# Patient Record
Sex: MALE | Race: WHITE | Employment: OTHER | RURAL
[De-identification: names, ages, dates, MRNs, and addresses within clinical notes are randomized per-mention and may not be internally consistent; named-entity substitution may affect disease eponyms.]

---

## 2017-01-13 ENCOUNTER — OFFICE VISIT (OUTPATIENT)
Dept: FAMILY MEDICINE CLINIC | Age: 67
End: 2017-01-13

## 2017-01-13 VITALS
BODY MASS INDEX: 23.51 KG/M2 | SYSTOLIC BLOOD PRESSURE: 145 MMHG | WEIGHT: 154.6 LBS | DIASTOLIC BLOOD PRESSURE: 72 MMHG | OXYGEN SATURATION: 99 % | RESPIRATION RATE: 17 BRPM | TEMPERATURE: 96.8 F | HEART RATE: 75 BPM

## 2017-01-13 DIAGNOSIS — Z13.39 SCREENING FOR ALCOHOLISM: ICD-10-CM

## 2017-01-13 DIAGNOSIS — Z13.31 SCREENING FOR DEPRESSION: ICD-10-CM

## 2017-01-13 DIAGNOSIS — F17.200 SMOKING: ICD-10-CM

## 2017-01-13 DIAGNOSIS — Z23 ENCOUNTER FOR IMMUNIZATION: ICD-10-CM

## 2017-01-13 DIAGNOSIS — Z00.00 ROUTINE GENERAL MEDICAL EXAMINATION AT A HEALTH CARE FACILITY: Primary | ICD-10-CM

## 2017-01-13 DIAGNOSIS — I10 ESSENTIAL HYPERTENSION: ICD-10-CM

## 2017-01-13 DIAGNOSIS — R05.9 COUGH: ICD-10-CM

## 2017-01-13 DIAGNOSIS — M51.36 DEGENERATIVE LUMBAR DISC: ICD-10-CM

## 2017-01-13 DIAGNOSIS — I25.10 ASCVD (ARTERIOSCLEROTIC CARDIOVASCULAR DISEASE): ICD-10-CM

## 2017-01-13 RX ORDER — PREDNISONE 20 MG/1
20 TABLET ORAL
Qty: 6 TAB | Refills: 0 | Status: SHIPPED | OUTPATIENT
Start: 2017-01-13 | End: 2017-01-13 | Stop reason: SDUPTHER

## 2017-01-13 RX ORDER — PREDNISONE 20 MG/1
20 TABLET ORAL
Qty: 6 TAB | Refills: 0 | Status: SHIPPED | OUTPATIENT
Start: 2017-01-13 | End: 2017-01-19

## 2017-01-13 RX ORDER — DICLOFENAC SODIUM 75 MG/1
TABLET, DELAYED RELEASE ORAL
COMMUNITY
Start: 2016-12-28 | End: 2017-03-03

## 2017-01-13 RX ORDER — LISINOPRIL 5 MG/1
5 TABLET ORAL DAILY
Qty: 90 TAB | Refills: 3 | Status: SHIPPED | OUTPATIENT
Start: 2017-01-13 | End: 2017-10-09 | Stop reason: SDUPTHER

## 2017-01-13 RX ORDER — ALBUTEROL SULFATE 90 UG/1
2 AEROSOL, METERED RESPIRATORY (INHALATION)
Qty: 1 INHALER | Refills: 2 | Status: SHIPPED | OUTPATIENT
Start: 2017-01-13 | End: 2018-03-02 | Stop reason: SDUPTHER

## 2017-01-13 NOTE — ACP (ADVANCE CARE PLANNING)
Discussed ACP with the patient and patient states no advanced directive or living will at the present time. Patient stated that he will bring documentation in to be scanned when completed.

## 2017-01-13 NOTE — MR AVS SNAPSHOT
Visit Information Date & Time Provider Department Dept. Phone Encounter #  
 1/13/2017 11:10 AM Anahi Cid MD [address] [place] [address] [id number] Follow-up Instructions Return in about 3 months (around 4/13/2017). Upcoming Health Maintenance Date Due Hepatitis C Screening 1950 DTaP/Tdap/Td series (1 - Tdap) 5/24/1971 FOBT Q 1 YEAR AGE 50-75 5/24/2000 ZOSTER VACCINE AGE 60> 5/24/2010 GLAUCOMA SCREENING Q2Y 5/24/2015 Pneumococcal 65+ Low/Medium Risk (1 of 2 - PCV13) 5/24/2015 MEDICARE YEARLY EXAM 5/24/2015 Allergies as of 1/13/2017  Review Complete On: 1/13/2017 By: Anahi Cid MD  
  
 Severity Noted Reaction Type Reactions Penicillins  12/01/2016    Rash Current Immunizations  Never Reviewed Name Date Influenza Vaccine 11/1/2016 Pneumococcal Conjugate (PCV-13)  Incomplete Not reviewed this visit You Were Diagnosed With   
  
 Codes Comments Routine general medical examination at a health care facility    -  Primary ICD-10-CM: Z00.00 ICD-9-CM: V70.0 Degenerative lumbar disc     ICD-10-CM: M51.36 
ICD-9-CM: 722.52 Essential hypertension     ICD-10-CM: I10 
ICD-9-CM: 401.9 Smoking     ICD-10-CM: F17.200 ICD-9-CM: 305.1 Encounter for immunization     ICD-10-CM: U08 ICD-9-CM: V03.89 Screening for alcoholism     ICD-10-CM: Z13.89 ICD-9-CM: V79.1 Screening for depression     ICD-10-CM: Z13.89 ICD-9-CM: V79.0 Cough     ICD-10-CM: R05 ICD-9-CM: 786.2 ASCVD (arteriosclerotic cardiovascular disease)     ICD-10-CM: I25.10 ICD-9-CM: 429.2, 440.9 Vitals BP Pulse Temp Resp Weight(growth percentile) SpO2  
 145/72 (BP 1 Location: Right arm, BP Patient Position: Sitting) 75 96.8 °F (36 °C) (Oral) 17 154 lb 9.6 oz (70.1 kg) 99% BMI Smoking Status 23.51 kg/m2 Current Every Day Smoker Vitals History BMI and BSA Data Body Mass Index Body Surface Area  
 23.51 kg/m 2 1.83 m 2 Preferred Pharmacy Pharmacy Name Phone [facility], [place] - [address] [phone] Your Updated Medication List  
  
   
This list is accurate as of: 1/13/17 12:32 PM.  Always use your most recent med list.  
  
  
  
  
 albuterol 90 mcg/actuation inhaler Commonly known as:  PROVENTIL HFA, VENTOLIN HFA, PROAIR HFA Take 2 Puffs by inhalation every four (4) hours as needed for Wheezing. amLODIPine 5 mg tablet Commonly known as:  Lennis Eagles Take 1 Tab by mouth daily. aspirin delayed-release 81 mg tablet Take 1 Tab by mouth daily. Indications: prevent stroke, heart attack  
  
 atorvastatin 20 mg tablet Commonly known as:  LIPITOR Take 1 Tab by mouth daily. Indications: cholesterol and heart  
  
 diclofenac EC 75 mg EC tablet Commonly known as:  VOLTAREN  
  
 lisinopril 5 mg tablet Commonly known as:  Mina Hu Take 1 Tab by mouth daily. predniSONE 20 mg tablet Commonly known as:  Reino Aragon Take 1 Tab by mouth daily (with breakfast) for 6 days. Indications: lungs  
  
 traMADol 50 mg tablet Commonly known as:  ULTRAM  
Take 1 Tab by mouth three (3) times daily as needed for Pain. Max Daily Amount: 150 mg.  
  
  
  
  
Prescriptions Sent to Pharmacy Refills  
 lisinopril (PRINIVIL, ZESTRIL) 5 mg tablet 3 Sig: Take 1 Tab by mouth daily. Class: Normal  
 Pharmacy: [address] [place] [address], [facility] [address] [place] [address] Ph #: [phone] Route: Oral  
 predniSONE (DELTASONE) 20 mg tablet 0 Sig: Take 1 Tab by mouth daily (with breakfast) for 6 days. Indications: lungs Class: Normal  
 Pharmacy: [address] [place] [address] [address] [place] [address] Nick Saxena Ph #: [phone]  Route: Oral  
 albuterol (PROVENTIL HFA, VENTOLIN HFA, PROAIR HFA) 90 mcg/actuation inhaler 2  
 Sig: Take 2 Puffs by inhalation every four (4) hours as needed for Wheezing. Class: Normal  
 Pharmacy: [address] [place] [address] Nick Saxena Ph #: [phone] Route: Inhalation We Performed the Following ADMIN PNEUMOCOCCAL VACCINE [ HCPCS] Bubba 68 [[id number] HCPCS] PNEUMOCOCCAL CONJ VACCINE 13 VALENT IM [id number] CPT(R)] Follow-up Instructions Return in about 3 months (around 4/13/2017). Patient Instructions If you have any questions regarding iSquare, you may call iSquare support at [phone]. Introducing [facility] SERVICES! Dear Abdoul Tran: Thank you for requesting a 55social account. Our records indicate that you already have an active 55social account. You can access your account anytime at [email or web address] Did you know that you can access your hospital and ER discharge instructions at any time in 55social? You can also review all of your test results from your hospital stay or ER visit. Additional Information If you have questions, please visit the Frequently Asked Questions section of the 55social website at [email or web address] Remember, 55social is NOT to be used for urgent needs. For medical emergencies, dial 911. Now available from your iPhone and Android! Please provide this summary of care documentation to your next provider. Your primary care clinician is listed as Yoel Marte. If you have any questions after today's visit, please call [phone].

## 2017-01-13 NOTE — PROGRESS NOTES
Grace Edwards is a 77 y.o. male presenting for/with:    Hypertension    HPI:  Hypertension. Blood pressures have been improving. Management at last visit included changing regimen to add norvasc 5 qd. Current regimen: calcium channel blocker. Symptoms include no symptoms. Patient denies chest pain, palpitations, peripheral edema. Lab review:   Lab Results   Component Value Date/Time    Sodium 147 12/01/2016 10:56 AM    Potassium 4.4 12/01/2016 10:56 AM    Chloride 107 12/01/2016 10:56 AM    CO2 27 12/01/2016 10:56 AM    Glucose 79 12/01/2016 10:56 AM    BUN 16 12/01/2016 10:56 AM    Creatinine 0.85 12/01/2016 10:56 AM    BUN/Creatinine ratio 19 12/01/2016 10:56 AM    GFR est  12/01/2016 10:56 AM    GFR est non-AA 91 12/01/2016 10:56 AM    Calcium 9.6 12/01/2016 10:56 AM     Hyperlipidemia. On lipitor 20 now. Francis well. No myalgias, arthralgias, unusual weakness. Lab Results   Component Value Date/Time    Cholesterol, total 208 12/01/2016 10:56 AM    HDL Cholesterol 39 12/01/2016 10:56 AM    LDL, calculated 143 12/01/2016 10:56 AM    VLDL, calculated 26 12/01/2016 10:56 AM    Triglyceride 128 12/01/2016 10:56 AM     Lab Results   Component Value Date/Time    ALT 16 12/01/2016 10:56 AM    AST 17 12/01/2016 10:56 AM    Alk. phosphatase 68 12/01/2016 10:56 AM    Bilirubin, total 0.2 12/01/2016 10:56 AM     Nephrolithiasis  No recent spells. DJD  Off diclofenac and on tramadol due to very high BP's for past few visits. Pain doing ok, using tramadol sparingly (only used 2x). Did ok at first, but then a dose made pt feel loopy. Prev seen by Dr. Lencho Ordonez at [facility] back specialty clinic in [place] for that. Hasn't had any shots. Last visit about a year ago. PMH, SH, Medications/Allergies: reviewed, on chart.     ROS:  Constitutional: No fever, chills or weight loss  Respiratory: No cough, SOB   CV: No chest pain or Palpitations    Visit Vitals    /72 (BP 1 Location: Right arm, BP Patient Position: Sitting)  Comment (BP 1 Location): manual    Pulse 75    Temp 96.8 °F (36 °C) (Oral)    Resp 17    Wt 154 lb 9.6 oz (70.1 kg)    SpO2 99%    BMI 23.51 kg/m2     Wt Readings from Last 3 Encounters:   01/13/17 154 lb 9.6 oz (70.1 kg)   12/16/16 155 lb (70.3 kg)   12/01/16 162 lb (73.5 kg)     Physical Examination: General appearance - alert, well appearing, and in no distress  Mental status - alert, oriented to person, place, and time  Eyes - pupils equal and reactive, extraocular eye movements intact  ENT - bilateral external ears and nose normal. Normal lips. 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 Neck - supple, no significant adenopathy, no thyromegaly or mass  Lymphatics - no palpable lymphadenopathy, no hepatosplenomegaly  Chest - clear to auscultation, no wheezes, rales or rhonchi, symmetric air entry  Heart - normal rate, regular rhythm, normal S1, S2, no murmurs, rubs, clicks or gallops  Extremities - peripheral pulses normal, no pedal edema, no clubbing or cyanosis    Wt Readings from Last 3 Encounters:   01/13/17 154 lb 9.6 oz (70.1 kg)   12/16/16 155 lb (70.3 kg)   12/01/16 162 lb (73.5 kg)     BP Readings from Last 3 Encounters:   01/13/17 145/72   12/16/16 156/74   12/01/16 189/73     A/p:  HTN  Improved, GFR good. Stay off of NSAIDs for now. Con't  norvasc 5mg every day. Add ACEI lisinopril 5mg qd. HLD and ASCVD  High risk, 32.7% 10 yr ASCVD risk. Doing ok on lipitor 20 and ASA. Chart review shows aortic atherosclerosis on an old spine series. DJD knee, L-spine  Change tramadol to half pill + APAP 1-2 tabs BID prn. Smoking  Again Discussed cessation. PT will work on cutting back. Try rx prednisone 20mg for 6d to help with cough. Plan PFT's this spring. 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 Working on that.     F/U 3mo    ______________________________________________________________________    Chelita Sandoval Abigail Denton is a 77 y.o. male and presents for annual Medicare Wellness Visit. Problem List: Reviewed with patient and discussed risk factors. Patient Active Problem List   Diagnosis Code    Degenerative lumbar disc M51.36    Essential hypertension I10    Nephrolithiasis N20.0    Smoking F17.200    Pure hypercholesterolemia E78.00    Post-traumatic osteoarthritis of left knee M17.32       Current medical providers:  Patient Care Team:  Jose Alfredo Rayo MD as PCP - General (Family Practice)    PMH, , Medications/Allergies: reviewed, on chart. Male Alcohol Screening: On any occasion during the past 3 months, have you had more than 4 drinks containing alcohol? No  Do you average more than 14 drinks per week? No    ROS:  Constitutional: No fever, chills or weight loss  Respiratory: No cough, SOB   CV: No chest pain or Palpitations    Objective:  Visit Vitals    /72 (BP 1 Location: Right arm, BP Patient Position: Sitting)  Comment (BP 1 Location): manual    Pulse 75    Temp 96.8 °F (36 °C) (Oral)    Resp 17    Wt 154 lb 9.6 oz (70.1 kg)    SpO2 99%    BMI 23.51 kg/m2    Body mass index is 23.51 kg/(m^2). Assessment of cognitive impairment: Alert and oriented x 3    Depression Screen:   PHQ 2 / 9, over the last two weeks 12/16/2016   Little interest or pleasure in doing things Not at all   Feeling down, depressed or hopeless Not at all   Total Score PHQ 2 0       Fall Risk Assessment:    Fall Risk Assessment, last 12 mths 12/16/2016   Able to walk? Yes   Fall in past 12 months? No       Functional Ability:   Does the patient exhibit a steady gait? yes   How long did it take the patient to get up and walk from a sitting position? 1s   Is the patient self reliant?  (ie can do own laundry, meals, household chores)  yes     Does the patient handle his/her own medications? yes     Does the patient handle his/her own money?    yes     Is the patients home safe (ie good lighting, handrails on stairs and bath, etc.)? yes     Did you notice or did patient express any hearing difficulties? no     Did you notice or did patient express any vision difficulties? no       Advance Care Planning:   Patient was offered the opportunity to discuss advance care planning:  yes     Does patient have an Advance Directive:  no   If no, did you provide information on Caring Connections? yes       Plan:      Colon ca screen: FIT check ordered. Orders Placed This Encounter    diclofenac EC (VOLTAREN) 75 mg EC tablet    lisinopril (PRINIVIL, ZESTRIL) 5 mg tablet    DISCONTD: predniSONE (DELTASONE) 20 mg tablet    predniSONE (DELTASONE) 20 mg tablet       Health Maintenance   Topic Date Due    Hepatitis C Screening  1950    DTaP/Tdap/Td series (1 - Tdap) 05/24/1971    FOBT Q 1 YEAR AGE 50-75  05/24/2000    ZOSTER VACCINE AGE 60>  05/24/2010    GLAUCOMA SCREENING Q2Y  05/24/2015    Pneumococcal 65+ Low/Medium Risk (1 of 2 - PCV13) 05/24/2015    MEDICARE YEARLY EXAM  05/24/2015    INFLUENZA AGE 9 TO ADULT  Completed       *Patient verbalized understanding and agreement with the plan. A copy of the After Visit Summary with personalized health plan was given to the patient today.

## 2017-01-23 RX ORDER — PREDNISONE 20 MG/1
TABLET ORAL
Qty: 6 TAB | Refills: 0 | Status: SHIPPED | OUTPATIENT
Start: 2017-01-23 | End: 2017-03-03

## 2017-03-03 ENCOUNTER — OFFICE VISIT (OUTPATIENT)
Dept: FAMILY MEDICINE CLINIC | Age: 67
End: 2017-03-03

## 2017-03-03 ENCOUNTER — TELEPHONE (OUTPATIENT)
Dept: FAMILY MEDICINE CLINIC | Age: 67
End: 2017-03-03

## 2017-03-03 VITALS
DIASTOLIC BLOOD PRESSURE: 69 MMHG | TEMPERATURE: 96.9 F | HEIGHT: 68 IN | OXYGEN SATURATION: 98 % | SYSTOLIC BLOOD PRESSURE: 138 MMHG | RESPIRATION RATE: 18 BRPM | HEART RATE: 90 BPM | WEIGHT: 154.8 LBS | BODY MASS INDEX: 23.46 KG/M2

## 2017-03-03 DIAGNOSIS — J40 BRONCHITIS: Primary | ICD-10-CM

## 2017-03-03 RX ORDER — AZITHROMYCIN 250 MG/1
TABLET, FILM COATED ORAL
Qty: 6 TAB | Refills: 0 | Status: SHIPPED | OUTPATIENT
Start: 2017-03-03 | End: 2017-03-08

## 2017-03-03 NOTE — TELEPHONE ENCOUNTER
Call from patient has had a chest cold for over a week, fever, cough, mucus. Did have a flu and pneumonia vaccine.  Asking for an ATB, explained most likely this is  viral but we will evaluate on his visit. scheduled

## 2017-03-03 NOTE — MR AVS SNAPSHOT
Visit Information Date & Time Provider Department Dept. Phone Encounter #  
 3/3/2017  1:00 PM Amada Clark, [facility] Siva Bentley [id number] Your Appointments 4/13/2017 11:10 AM  
ESTABLISHED PATIENT with Dino Libman, MD [address] [facility] Appt Note: 3 mo f/u  
 [address] [facility] [address] [facility],5[address] [address] [phone]  
  
   
 [address] [facility] [address] [facility],5Th [address] [address] Upcoming Health Maintenance Date Due Hepatitis C Screening 1950 DTaP/Tdap/Td series (1 - Tdap) 5/24/1971 FOBT Q 1 YEAR AGE 50-75 5/24/2000 ZOSTER VACCINE AGE 60> 5/24/2010 GLAUCOMA SCREENING Q2Y 5/24/2015 Pneumococcal 65+ Low/Medium Risk (2 of 2 - PPSV23) 1/13/2018 MEDICARE YEARLY EXAM 1/14/2018 Allergies as of 3/3/2017  Review Complete On: 3/3/2017 By: Amada Clark MD  
  
 Severity Noted Reaction Type Reactions Penicillins  12/01/2016    Rash Current Immunizations  Never Reviewed Name Date Influenza Vaccine 11/1/2016 Pneumococcal Conjugate (PCV-13) 1/13/2017 12:32 PM  
  
 Not reviewed this visit Vitals BP  
  
  
  
  
  
 138/69 (BP 1 Location: Right arm, BP Patient Position: Sitting) Vitals History BMI and BSA Data Body Mass Index Body Surface Area  
 23.54 kg/m 2 1.84 m 2 Preferred Pharmacy Pharmacy Name Phone [facility] [place] [address] [address] [phone] Your Updated Medication List  
  
   
This list is accurate as of: 3/3/17  1:19 PM.  Always use your most recent med list.  
  
  
  
  
 albuterol 90 mcg/actuation inhaler Commonly known as:  PROVENTIL HFA, VENTOLIN HFA, PROAIR HFA Take 2 Puffs by inhalation every four (4) hours as needed for Wheezing. amLODIPine 5 mg tablet Commonly known as:  Ashwini Nelly Take 1 Tab by mouth daily. aspirin delayed-release 81 mg tablet Take 1 Tab by mouth daily. Indications: prevent stroke, heart attack  
  
 atorvastatin 20 mg tablet Commonly known as:  LIPITOR Take 1 Tab by mouth daily. Indications: cholesterol and heart  
  
 lisinopril 5 mg tablet Commonly known as:  Corin Bernabe Take 1 Tab by mouth daily. traMADol 50 mg tablet Commonly known as:  ULTRAM  
Take 1 Tab by mouth three (3) times daily as needed for Pain. Max Daily Amount: 150 mg. Introducing [facility] & HE[facility] SERVICES! Dear Emily Ortega: Thank you for requesting a NJVC account. Our records indicate that you already have an active NJVC account. You can access your account anytime at [email or web address] Did you know that you can access your hospital and ER discharge instructions at any time in NJVC? You can also review all of your test results from your hospital stay or ER visit. Additional Information If you have questions, please visit the Frequently Asked Questions section of the NJVC website at [email or web address] Remember, NJVC is NOT to be used for urgent needs. For medical emergencies, dial 911. Now available from your iPhone and Android! Please provide this summary of care documentation to your next provider. Your primary care clinician is listed as Shahnaz Marte. If you have any questions after today's visit, please call [phone].

## 2017-03-03 NOTE — PROGRESS NOTES
Chief Complaint   Patient presents with    Nasal Congestion     sinus pressure, sore throat, night sweats. HPI:       is a 77 y.o. male. Hypertension well controlled with meds. Down to 6 cigarettes daily. Nonbloody productive cough for 3 weeks, with sweats and chills. Thick mucus. Has Proair inhaler. Appetite is good. No WHITE or rigors. Allergies   Allergen Reactions    Penicillins Rash       Current Outpatient Prescriptions   Medication Sig    azithromycin (ZITHROMAX) 250 mg tablet Take 2 tablets today, then take 1 tablet daily    lisinopril (PRINIVIL, ZESTRIL) 5 mg tablet Take 1 Tab by mouth daily.  amLODIPine (NORVASC) 5 mg tablet Take 1 Tab by mouth daily.  aspirin delayed-release 81 mg tablet Take 1 Tab by mouth daily. Indications: prevent stroke, heart attack    atorvastatin (LIPITOR) 20 mg tablet Take 1 Tab by mouth daily. Indications: cholesterol and heart    albuterol (PROVENTIL HFA, VENTOLIN HFA, PROAIR HFA) 90 mcg/actuation inhaler Take 2 Puffs by inhalation every four (4) hours as needed for Wheezing.  traMADol (ULTRAM) 50 mg tablet Take 1 Tab by mouth three (3) times daily as needed for Pain. Max Daily Amount: 150 mg. No current facility-administered medications for this visit. Past Medical History:   Diagnosis Date    Arthritis     Hypertension          ROS:  Denies fever, chills, cough, chest pain, SOB,  nausea, vomiting, or diarrhea. Denies wt loss, wt gain, hemoptysis, hematochezia or melena.     Physical Examination:    Visit Vitals    /69 (BP 1 Location: Right arm, BP Patient Position: Sitting)    Pulse 90    Temp 96.9 °F (36.1 °C) (Oral)    Resp 18    Ht 5' 8\" (1.727 m)    Wt 154 lb 12.8 oz (70.2 kg)    SpO2 98%    BMI 23.54 kg/m2     General: Alert and Ox3, Fluent speech  HEENT:  NC/AT, EOMI, OP: clear  Neck:  Supple, no adenopathy, JVD, mass or bruit  Chest:  Clear to Ausculation, without wheezes, rales, rubs or cheri  Cardiac: RRR  Abdomen:  +BS, soft, nontender without palpable HSM  Extremities:  No cyanosis, clubbing or edema  Neurologic:  Ambulatory without assist, CN 2-12 grossly intact. Moves all extremities. Skin: no rash  Lymphadenopathy: no cervical or supraclavicular nodes      ASSESSMENT AND PLAN:     1. Bronchitis:  Begin Prednisone (has some at home): 20 mg daily for a week and begin Z Pack. If still coughing by Health system' day, check CXR.     Orders Placed This Encounter    azithromycin (ZITHROMAX) 250 mg tablet     Sig: Take 2 tablets today, then take 1 tablet daily     Dispense:  6 Tab     Refill:  0       Mar Boeck, MD, [address]

## 2017-04-13 ENCOUNTER — OFFICE VISIT (OUTPATIENT)
Dept: FAMILY MEDICINE CLINIC | Age: 67
End: 2017-04-13

## 2017-04-13 VITALS
HEIGHT: 68 IN | DIASTOLIC BLOOD PRESSURE: 59 MMHG | WEIGHT: 158 LBS | HEART RATE: 78 BPM | OXYGEN SATURATION: 100 % | TEMPERATURE: 97.8 F | SYSTOLIC BLOOD PRESSURE: 140 MMHG | RESPIRATION RATE: 18 BRPM | BODY MASS INDEX: 23.95 KG/M2

## 2017-04-13 DIAGNOSIS — R05.9 COUGH: ICD-10-CM

## 2017-04-13 DIAGNOSIS — F17.200 SMOKING: ICD-10-CM

## 2017-04-13 DIAGNOSIS — I10 ESSENTIAL HYPERTENSION: ICD-10-CM

## 2017-04-13 DIAGNOSIS — I25.10 ASCVD (ARTERIOSCLEROTIC CARDIOVASCULAR DISEASE): Primary | ICD-10-CM

## 2017-04-13 NOTE — PATIENT INSTRUCTIONS
Keep up the good work! Think about getting a shingles shot. Stopping Smoking: Care Instructions  Your Care Instructions  Cigarette smokers crave the nicotine in cigarettes. Giving it up is much harder than simply changing a habit. Your body has to stop craving the nicotine. It is hard to quit, but you can do it. There are many tools that people use to quit smoking. You may find that combining tools works best for you. There are several steps to quitting. First you get ready to quit. Then you get support to help you. After that, you learn new skills and behaviors to become a nonsmoker. For many people, a necessary step is getting and using medicine. Your doctor will help you set up the plan that best meets your needs. You may want to attend a smoking cessation program to help you quit smoking. When you choose a program, look for one that has proven success. Ask your doctor for ideas. You will greatly increase your chances of success if you take medicine as well as get counseling or join a cessation program.  Some of the changes you feel when you first quit tobacco are uncomfortable. Your body will miss the nicotine at first, and you may feel short-tempered and grumpy. You may have trouble sleeping or concentrating. Medicine can help you deal with these symptoms. You may struggle with changing your smoking habits and rituals. The last step is the tricky one: Be prepared for the smoking urge to continue for a time. This is a lot to deal with, but keep at it. You will feel better. Follow-up care is a key part of your treatment and safety. Be sure to make and go to all appointments, and call your doctor if you are having problems. Its also a good idea to know your test results and keep a list of the medicines you take. How can you care for yourself at home? · Ask your family, friends, and coworkers for support. You have a better chance of quitting if you have help and support.   · Join a support group, such as Nicotine Anonymous, for people who are trying to quit smoking. · Consider signing up for a smoking cessation program, such as the American Lung Association's Freedom from Smoking program.  · Set a quit date. Pick your date carefully so that it is not right in the middle of a big deadline or stressful time. Once you quit, do not even take a puff. Get rid of all ashtrays and lighters after your last cigarette. Clean your house and your clothes so that they do not smell of smoke. · Learn how to be a nonsmoker. Think about ways you can avoid those things that make you reach for a cigarette. ¨ Avoid situations that put you at greatest risk for smoking. For some people, it is hard to have a drink with friends without smoking. For others, they might skip a coffee break with coworkers who smoke. ¨ Change your daily routine. Take a different route to work or eat a meal in a different place. · Cut down on stress. Calm yourself or release tension by doing an activity you enjoy, such as reading a book, taking a hot bath, or gardening. · Talk to your doctor or pharmacist about nicotine replacement therapy, which replaces the nicotine in your body. You still get nicotine but you do not use tobacco. Nicotine replacement products help you slowly reduce the amount of nicotine you need. These products come in several forms, many of them available over-the-counter:  ¨ Nicotine patches  ¨ Nicotine gum and lozenges  ¨ Nicotine inhaler  · Ask your doctor about bupropion (Wellbutrin) or varenicline (Chantix), which are prescription medicines. They do not contain nicotine. They help you by reducing withdrawal symptoms, such as stress and anxiety. · Some people find hypnosis, acupuncture, and massage helpful for ending the smoking habit. · Eat a healthy diet and get regular exercise. Having healthy habits will help your body move past its craving for nicotine. · Be prepared to keep trying.  Most people are not successful the first few times they try to quit. Do not get mad at yourself if you smoke again. Make a list of things you learned and think about when you want to try again, such as next week, next month, or next year. Where can you learn more? Go to http://shellie-easton.info/. Enter Y733 in the search box to learn more about \"Stopping Smoking: Care Instructions. \"  Current as of: May 26, 2016  Content Version: 11.2  © 4254-9227 "[employer] Incorporated. Care instructions adapted under license by [employer] (which disclaims liability or warranty for this information). If you have questions about a medical condition or this instruction, always ask your healthcare professional. [address] any warranty or liability for your use of this information.

## 2017-04-13 NOTE — MR AVS SNAPSHOT
Visit Information Date & Time Provider Department Dept. Phone Encounter #  
 4/13/2017 11:10 AM Kristen Burns MD [address] [place] [address] [id number] Follow-up Instructions Return in about 6 months (around 10/13/2017). Follow-up and Disposition History Upcoming Health Maintenance Date Due Hepatitis C Screening 1950 DTaP/Tdap/Td series (1 - Tdap) 5/24/1971 FOBT Q 1 YEAR AGE 50-75 5/24/2000 ZOSTER VACCINE AGE 60> 5/24/2010 GLAUCOMA SCREENING Q2Y 5/24/2015 Pneumococcal 65+ Low/Medium Risk (2 of 2 - PPSV23) 1/13/2018 MEDICARE YEARLY EXAM 1/14/2018 Allergies as of 4/13/2017  Review Complete On: 4/13/2017 By: Kristen Burns MD  
  
 Severity Noted Reaction Type Reactions Penicillins  12/01/2016    Rash Current Immunizations  Never Reviewed Name Date Influenza Vaccine 11/1/2016 Pneumococcal Conjugate (PCV-13) 1/13/2017 12:32 PM  
  
 Not reviewed this visit You Were Diagnosed With   
  
 Codes Comments ASCVD (arteriosclerotic cardiovascular disease)    -  Primary ICD-10-CM: I25.10 ICD-9-CM: 429.2, 440.9 Essential hypertension     ICD-10-CM: I10 
ICD-9-CM: 401.9 Smoking     ICD-10-CM: F17.200 ICD-9-CM: 305.1 Cough     ICD-10-CM: R05 ICD-9-CM: 821. 2 Vitals BP Pulse Temp Resp Height(growth percentile) Weight(growth percentile) 140/59 78 97.8 °F (36.6 °C) (Oral) 18 5' 8\" (1.727 m) 158 lb (71.7 kg) SpO2 BMI Smoking Status 100% 24.02 kg/m2 Current Every Day Smoker BMI and BSA Data Body Mass Index Body Surface Area 24.02 kg/m 2 1.85 m 2 Preferred Pharmacy Pharmacy Name Phone [facility] [place] [facility] [address] [phone] Your Updated Medication List  
  
   
This list is accurate as of: 4/13/17 12:27 PM.  Always use your most recent med list.  
  
  
  
  
 albuterol 90 mcg/actuation inhaler Commonly known as:  PROVENTIL HFA, VENTOLIN HFA, PROAIR HFA Take 2 Puffs by inhalation every four (4) hours as needed for Wheezing. amLODIPine 5 mg tablet Commonly known as:  Monica Jean Take 1 Tab by mouth daily. aspirin delayed-release 81 mg tablet Take 1 Tab by mouth daily. Indications: prevent stroke, heart attack  
  
 atorvastatin 20 mg tablet Commonly known as:  LIPITOR Take 1 Tab by mouth daily. Indications: cholesterol and heart  
  
 lisinopril 5 mg tablet Commonly known as:  Salvadore Dine Take 1 Tab by mouth daily. Follow-up Instructions Return in about 6 months (around 10/13/2017). To-Do List   
 04/13/2017 VT Charge:  VT EVAL OF BRONCHOSPASM Patient Instructions Keep up the good work! Think about getting a shingles shot. Stopping Smoking: Care Instructions Your Care Instructions Cigarette smokers crave the nicotine in cigarettes. Giving it up is much harder than simply changing a habit. Your body has to stop craving the nicotine. It is hard to quit, but you can do it. There are many tools that people use to quit smoking. You may find that combining tools works best for you. There are several steps to quitting. First you get ready to quit. Then you get support to help you. After that, you learn new skills and behaviors to become a nonsmoker. For many people, a necessary step is getting and using medicine. Your doctor will help you set up the plan that best meets your needs. You may want to attend a smoking cessation program to help you quit smoking. When you choose a program, look for one that has proven success. Ask your doctor for ideas. You will greatly increase your chances of success if you take medicine as well as get counseling or join a cessation program. 
Some of the changes you feel when you first quit tobacco are uncomfortable.  Your body will miss the nicotine at first, and you may feel short-tempered and grumpy. You may have trouble sleeping or concentrating. Medicine can help you deal with these symptoms. You may struggle with changing your smoking habits and rituals. The last step is the tricky one: Be prepared for the smoking urge to continue for a time. This is a lot to deal with, but keep at it. You will feel better. Follow-up care is a key part of your treatment and safety. Be sure to make and go to all appointments, and call your doctor if you are having problems. Its also a good idea to know your test results and keep a list of the medicines you take. How can you care for yourself at home? · Ask your family, friends, and coworkers for support. You have a better chance of quitting if you have help and support. · Join a support group, such as Nicotine Anonymous, for people who are trying to quit smoking. · Consider signing up for a smoking cessation program, such as the American Lung Association's Freedom from Smoking program. 
· Set a quit date. Pick your date carefully so that it is not right in the middle of a big deadline or stressful time. Once you quit, do not even take a puff. Get rid of all ashtrays and lighters after your last cigarette. Clean your house and your clothes so that they do not smell of smoke. · Learn how to be a nonsmoker. Think about ways you can avoid those things that make you reach for a cigarette. ¨ Avoid situations that put you at greatest risk for smoking. For some people, it is hard to have a drink with friends without smoking. For others, they might skip a coffee break with coworkers who smoke. ¨ Change your daily routine. Take a different route to work or eat a meal in a different place. · Cut down on stress. Calm yourself or release tension by doing an activity you enjoy, such as reading a book, taking a hot bath, or gardening.  
· Talk to your doctor or pharmacist about nicotine replacement therapy, which replaces the nicotine in your body. You still get nicotine but you do not use tobacco. Nicotine replacement products help you slowly reduce the amount of nicotine you need. These products come in several forms, many of them available over-the-counter: ¨ Nicotine patches ¨ Nicotine gum and lozenges ¨ Nicotine inhaler · Ask your doctor about bupropion (Wellbutrin) or varenicline (Chantix), which are prescription medicines. They do not contain nicotine. They help you by reducing withdrawal symptoms, such as stress and anxiety. · Some people find hypnosis, acupuncture, and massage helpful for ending the smoking habit. · Eat a healthy diet and get regular exercise. Having healthy habits will help your body move past its craving for nicotine. · Be prepared to keep trying. Most people are not successful the first few times they try to quit. Do not get mad at yourself if you smoke again. Make a list of things you learned and think about when you want to try again, such as next week, next month, or next year. Where can you learn more? Go to http://shellie-easton.info/. Enter A097 in the search box to learn more about \"Stopping Smoking: Care Instructions. \" Current as of: May 26, 2016 Content Version: 11.2 © 5214-3154 Newser, Placements.[employer]. Care instructions adapted under license by [employer] (which disclaims liability or warranty for this information). If you have questions about a medical condition or this instruction, always ask your healthcare professional. [address] any warranty or liability for your use of this information. Introducing [facility] [place] & HEALTH SERVICES! Dear Rory Hoskins: Thank you for requesting a iMICROQ account. Our records indicate that you already have an active iMICROQ account. You can access your account anytime at [email or web address] Did you know that you can access your hospital and ER discharge instructions at any time in Babycare? You can also review all of your test results from your hospital stay or ER visit. Additional Information If you have questions, please visit the Frequently Asked Questions section of the Babycare website at [email or web address] Remember, Babycare is NOT to be used for urgent needs. For medical emergencies, dial 911. Now available from your iPhone and Android! Please provide this summary of care documentation to your next provider. Your primary care clinician is listed as Ricardo Marte. If you have any questions after today's visit, please call [phone].

## 2017-04-13 NOTE — PROGRESS NOTES
Codey Ott is a 77 y.o. male presenting for/with:    Blood Pressure Check    HPI:  Hypertension. Blood pressures have been improving. Management at last visit included changing regimen to add lisinporil 5 qd. Current regimen: ACEI and calcium channel blocker. Symptoms include no symptoms. Patient denies chest pain, palpitations, peripheral edema. Lab review:   Lab Results   Component Value Date/Time    Sodium 147 12/01/2016 10:56 AM    Potassium 4.4 12/01/2016 10:56 AM    Chloride 107 12/01/2016 10:56 AM    CO2 27 12/01/2016 10:56 AM    Glucose 79 12/01/2016 10:56 AM    BUN 16 12/01/2016 10:56 AM    Creatinine 0.85 12/01/2016 10:56 AM    BUN/Creatinine ratio 19 12/01/2016 10:56 AM    GFR est  12/01/2016 10:56 AM    GFR est non-AA 91 12/01/2016 10:56 AM    Calcium 9.6 12/01/2016 10:56 AM     Hyperlipidemia. On lipitor 20 now. Francis well. No myalgias, arthralgias, unusual weakness. Lab Results   Component Value Date/Time    Cholesterol, total 208 12/01/2016 10:56 AM    HDL Cholesterol 39 12/01/2016 10:56 AM    LDL, calculated 143 12/01/2016 10:56 AM    VLDL, calculated 26 12/01/2016 10:56 AM    Triglyceride 128 12/01/2016 10:56 AM     Lab Results   Component Value Date/Time    ALT (SGPT) 16 12/01/2016 10:56 AM    AST (SGOT) 17 12/01/2016 10:56 AM    Alk. phosphatase 68 12/01/2016 10:56 AM    Bilirubin, total 0.2 12/01/2016 10:56 AM     Nephrolithiasis  No recent spells. DJD  Off diclofenac and on TLC's. Not doing too bad. Gets sore if he stands. Using tramadol very sparingly, used 1 pill in past 2mo. Salonpas patches also help. PMH, SH, Medications/Allergies: reviewed, on chart.     ROS:  Constitutional: No fever, chills or weight loss  Respiratory: No cough, SOB   CV: No chest pain or Palpitations    Visit Vitals    /59    Pulse 78    Temp 97.8 °F (36.6 °C) (Oral)    Resp 18    Ht 5' 8\" (1.727 m)    Wt 158 lb (71.7 kg)    SpO2 100%    BMI 24.02 kg/m2     Wt Readings from Last 3 Encounters:   04/13/17 158 lb (71.7 kg)   03/03/17 154 lb 12.8 oz (70.2 kg)   01/13/17 154 lb 9.6 oz (70.1 kg)     Physical Examination: General appearance - alert, well appearing, and in no distress  Mental status - alert, oriented to person, place, and time  Eyes - pupils equal and reactive, extraocular eye movements intact  ENT - bilateral external ears and nose normal. Normal lips. OP shows resolved gum whitening. Neck - supple, no significant adenopathy, no thyromegaly or mass  Lymphatics - no palpable lymphadenopathy, no hepatosplenomegaly  Chest - clear to auscultation, no wheezes, rales or rhonchi, symmetric air entry  Heart - normal rate, regular rhythm, normal S1, S2, no murmurs, rubs, clicks or gallops  Extremities - peripheral pulses normal, no pedal edema, no clubbing or cyanosis    A/p:  HTN  Improved, GFR good, in goal. Con't  norvasc 5mg every day and ACEI lisinopril 5mg qd. HLD and ASCVD  High risk, 32.7% 10 yr ASCVD risk. Doing ok on lipitor 20 and ASA. Chart review shows aortic atherosclerosis on an old spine series. Keep BPs well controlled. DJD knee, L-spine  Change tramadol to half pill + APAP 1-2 tabs BID prn. Smoking  Again Discussed cessation. PFT's ordered. Hasn't picked a date yet, does want to quit. Leukoplakia  Resolved. Monitor for now    F/U 6mo, plan labs next visit. Pt thinking of zostavax. Will consider next visit. Health Maintenance   Topic Date Due    Hepatitis C Screening  1950    DTaP/Tdap/Td series (1 - Tdap) 05/24/1971    FOBT Q 1 YEAR AGE 50-75  05/24/2000    ZOSTER VACCINE AGE 60>  05/24/2010    GLAUCOMA SCREENING Q2Y  05/24/2015    Pneumococcal 65+ Low/Medium Risk (2 of 2 - PPSV23) 01/13/2018    MEDICARE YEARLY EXAM  01/14/2018    INFLUENZA AGE 9 TO ADULT  Completed     *Patient verbalized understanding and agreement with the plan. A copy of the After Visit Summary with personalized health plan was given to the patient today.

## 2017-10-09 DIAGNOSIS — I10 ESSENTIAL HYPERTENSION: ICD-10-CM

## 2017-10-09 DIAGNOSIS — E78.00 PURE HYPERCHOLESTEROLEMIA: ICD-10-CM

## 2017-10-10 RX ORDER — LISINOPRIL 5 MG/1
TABLET ORAL
Qty: 90 TAB | Refills: 3 | Status: SHIPPED | OUTPATIENT
Start: 2017-10-10 | End: 2017-10-16 | Stop reason: SDUPTHER

## 2017-10-10 RX ORDER — AMLODIPINE BESYLATE 5 MG/1
TABLET ORAL
Qty: 90 TAB | Refills: 3 | Status: SHIPPED | OUTPATIENT
Start: 2017-10-10 | End: 2018-07-25 | Stop reason: SDUPTHER

## 2017-10-10 RX ORDER — ATORVASTATIN CALCIUM 20 MG/1
TABLET, FILM COATED ORAL
Qty: 90 TAB | Refills: 3 | Status: SHIPPED | OUTPATIENT
Start: 2017-10-10 | End: 2018-07-25 | Stop reason: SDUPTHER

## 2017-10-16 ENCOUNTER — OFFICE VISIT (OUTPATIENT)
Dept: FAMILY MEDICINE CLINIC | Age: 67
End: 2017-10-16

## 2017-10-16 VITALS
HEIGHT: 68 IN | OXYGEN SATURATION: 99 % | BODY MASS INDEX: 23.95 KG/M2 | WEIGHT: 158 LBS | DIASTOLIC BLOOD PRESSURE: 66 MMHG | TEMPERATURE: 98.4 F | SYSTOLIC BLOOD PRESSURE: 152 MMHG | HEART RATE: 75 BPM

## 2017-10-16 DIAGNOSIS — I10 ESSENTIAL HYPERTENSION: ICD-10-CM

## 2017-10-16 DIAGNOSIS — Z11.59 ENCOUNTER FOR HEPATITIS C SCREENING TEST FOR LOW RISK PATIENT: ICD-10-CM

## 2017-10-16 DIAGNOSIS — F17.200 SMOKING: ICD-10-CM

## 2017-10-16 DIAGNOSIS — M47.816 SPONDYLOSIS OF LUMBAR REGION WITHOUT MYELOPATHY OR RADICULOPATHY: ICD-10-CM

## 2017-10-16 DIAGNOSIS — Z23 ENCOUNTER FOR IMMUNIZATION: Primary | ICD-10-CM

## 2017-10-16 DIAGNOSIS — F43.21 ADJUSTMENT DISORDER WITH DEPRESSED MOOD: ICD-10-CM

## 2017-10-16 DIAGNOSIS — E78.00 PURE HYPERCHOLESTEROLEMIA: ICD-10-CM

## 2017-10-16 RX ORDER — TRAMADOL HYDROCHLORIDE 50 MG/1
50 TABLET ORAL
Qty: 45 TAB | Refills: 1 | Status: SHIPPED | OUTPATIENT
Start: 2017-10-16 | End: 2018-01-31 | Stop reason: SDUPTHER

## 2017-10-16 RX ORDER — TRAMADOL HYDROCHLORIDE 50 MG/1
50 TABLET ORAL
COMMUNITY
End: 2017-10-16 | Stop reason: SDUPTHER

## 2017-10-16 RX ORDER — LISINOPRIL 10 MG/1
TABLET ORAL
Qty: 90 TAB | Refills: 3 | Status: SHIPPED | OUTPATIENT
Start: 2017-10-16 | End: 2018-01-31 | Stop reason: SDUPTHER

## 2017-10-16 NOTE — PROGRESS NOTES
Selma Huber is a 79 y.o. male presenting for/with:    Hypertension (follow up)    HPI:  Increased stress. Daughter and grandchild, Daughter's boyfriend all living with pt. Stressful. PHQ over the last two weeks 10/16/2017   PHQ Not Done -   Little interest or pleasure in doing things Nearly every day   Feeling down, depressed or hopeless Nearly every day   Total Score PHQ 2 6   Trouble falling or staying asleep, or sleeping too much Nearly every day   Feeling tired or having little energy Nearly every day   Poor appetite or overeating More than half the days   Feeling bad about yourself - or that you are a failure or have let yourself or your family down Not at all   Trouble concentrating on things such as school, work, reading or watching TV Not at all   Moving or speaking so slowly that other people could have noticed; or the opposite being so fidgety that others notice Not at all   Thoughts of being better off dead, or hurting yourself in some way Not at all   PHQ 9 Score 14   How difficult have these problems made it for you to do your work, take care of your home and get along with others Somewhat difficult     Hypertension. Blood pressures have been up a little. Management at last visit included con't add lisinporil 5 qd. Current regimen: ACEI and calcium channel blocker. Symptoms include no symptoms. Patient denies chest pain, palpitations, peripheral edema. Lab review:   Lab Results   Component Value Date/Time    Sodium 147 12/01/2016 10:56 AM    Potassium 4.4 12/01/2016 10:56 AM    Chloride 107 12/01/2016 10:56 AM    CO2 27 12/01/2016 10:56 AM    Glucose 79 12/01/2016 10:56 AM    BUN 16 12/01/2016 10:56 AM    Creatinine 0.85 12/01/2016 10:56 AM    BUN/Creatinine ratio 19 12/01/2016 10:56 AM    GFR est  12/01/2016 10:56 AM    GFR est non-AA 91 12/01/2016 10:56 AM    Calcium 9.6 12/01/2016 10:56 AM     Hyperlipidemia. On lipitor 20 now. Francis well.  No myalgias, arthralgias, unusual weakness. Lab Results   Component Value Date/Time    Cholesterol, total 208 12/01/2016 10:56 AM    HDL Cholesterol 39 12/01/2016 10:56 AM    LDL, calculated 143 12/01/2016 10:56 AM    VLDL, calculated 26 12/01/2016 10:56 AM    Triglyceride 128 12/01/2016 10:56 AM     Lab Results   Component Value Date/Time    ALT (SGPT) 16 12/01/2016 10:56 AM    AST (SGOT) 17 12/01/2016 10:56 AM    Alk. phosphatase 68 12/01/2016 10:56 AM    Bilirubin, total 0.2 12/01/2016 10:56 AM     Nephrolithiasis  No recent spells. DJD  Off diclofenac and on TLC's. Not doing too bad. Gets sore if he stands. Using tramadol very sparingly, used 80pills in past 10mo. Salonpas patches also help. PMH, SH, Medications/Allergies: reviewed, on chart. ROS:  Constitutional: No fever, chills or weight loss  Respiratory: No cough, SOB   CV: No chest pain or Palpitations    Visit Vitals    /66    Pulse 75    Temp 98.4 °F (36.9 °C) (Temporal)    Ht 5' 8\" (1.727 m)    Wt 158 lb (71.7 kg)    SpO2 99%    BMI 24.02 kg/m2     Wt Readings from Last 3 Encounters:   10/16/17 158 lb (71.7 kg)   04/13/17 158 lb (71.7 kg)   03/03/17 154 lb 12.8 oz (70.2 kg)     BP Readings from Last 3 Encounters:   10/16/17 152/66   04/13/17 140/59   03/03/17 138/69     Physical Examination: General appearance - alert, well appearing, and in no distress  Mental status - alert, oriented to person, place, and time  Eyes - pupils equal and reactive, extraocular eye movements intact  ENT - bilateral external ears and nose normal. Normal lips. OP shows resolved gum whitening.    Neck - supple, no significant adenopathy, no thyromegaly or mass  Lymphatics - no palpable lymphadenopathy, no hepatosplenomegaly  Chest - clear to auscultation, no wheezes, rales or rhonchi, symmetric air entry  Heart - normal rate, regular rhythm, normal S1, S2, no murmurs, rubs, clicks or gallops  Extremities - peripheral pulses normal, no pedal edema, no clubbing or cyanosis    A/p:  HTN  A little worse. Recheck BMP for GFR. Con't  norvasc 5mg every day and boost ACEI lisinopril to 10mg qd. Check labs. HLD and ASCVD  High risk, 32.7% 10 yr ASCVD risk. Doing ok on lipitor 20 and ASA. Chart review shows aortic atherosclerosis on an old spine series. Keep BPs well controlled, recheck labs. DJD knee, L-spine  Due for RF tramadol 50, half pill + APAP 1-2 tabs BID prn.  reviewed    Smoking  Again Discussed cessation. Pt under a lot of stress. Will consider quitting sometime soon. Sample dulera given. Adjustment d/o  Work on cognitive measures. [facility]erred to [facility] therapy. Consider Rx if not improving in a couple weeks. F/U 3mo,     HCM  FIT kit given. Hep C screen today with regular labs. Health Maintenance   Topic Date Due    Hepatitis C Screening  1950    FOBT Q 1 YEAR AGE 50-75  05/24/2000    GLAUCOMA SCREENING Q2Y  05/24/2015    INFLUENZA AGE 9 TO ADULT  08/01/2017    Pneumococcal 65+ Low/Medium Risk (2 of 2 - PPSV23) 01/13/2018    MEDICARE YEARLY EXAM  01/14/2018    DTaP/Tdap/Td series (2 - Td) 10/16/2027    ZOSTER VACCINE AGE 60>  Addressed     *Patient verbalized understanding and agreement with the plan. A copy of the After Visit Summary with personalized health plan was given to the patient today.

## 2017-10-16 NOTE — MR AVS SNAPSHOT
Visit Information Date & Time Provider Department Dept. Phone Encounter #  
 10/16/2017  9:10 AM Ricardo Scott MD [address] [place] [address] [id number] Upcoming Health Maintenance Date Due Hepatitis C Screening 1950 FOBT Q 1 YEAR AGE 50-75 5/24/2000 GLAUCOMA SCREENING Q2Y 5/24/2015 INFLUENZA AGE 9 TO ADULT 8/1/2017 Pneumococcal 65+ Low/Medium Risk (2 of 2 - PPSV23) 1/13/2018 MEDICARE YEARLY EXAM 1/14/2018 DTaP/Tdap/Td series (2 - Td) 10/16/2027 Allergies as of 10/16/2017  Review Complete On: 10/16/2017 By: Ricardo Scott MD  
  
 Severity Noted Reaction Type Reactions Penicillins  12/01/2016    Rash Current Immunizations  Never Reviewed Name Date Influenza Vaccine 11/1/2016 Influenza Vaccine (Quad) PF 10/16/2017 Pneumococcal Conjugate (PCV-13) 1/13/2017 12:32 PM  
  
 Not reviewed this visit You Were Diagnosed With   
  
 Codes Comments Encounter for immunization    -  Primary ICD-10-CM: W19 ICD-9-CM: V03.89 Essential hypertension     ICD-10-CM: I10 
ICD-9-CM: 401.9 Smoking     ICD-10-CM: F17.200 ICD-9-CM: 305.1 Pure hypercholesterolemia     ICD-10-CM: E78.00 ICD-9-CM: 272.0 Spondylosis of lumbar region without myelopathy or radiculopathy     ICD-10-CM: M47.816 ICD-9-CM: 721.3 Encounter for hepatitis C screening test for low risk patient     ICD-10-CM: Z11.59 
ICD-9-CM: V73.89 Adjustment disorder with depressed mood     ICD-10-CM: F43.21 ICD-9-CM: 309.0 Vitals BP Pulse Temp Height(growth percentile) Weight(growth percentile) SpO2  
 152/66 75 98.4 °F (36.9 °C) (Temporal) 5' 8\" (1.727 m) 158 lb (71.7 kg) 99% BMI Smoking Status 24.02 kg/m2 Current Every Day Smoker BMI and BSA Data Body Mass Index Body Surface Area 24.02 kg/m 2 1.85 m 2 Preferred Pharmacy Pharmacy Name Phone [address] [phone] Your Updated Medication List  
  
   
This list is accurate as of: 10/16/17  9:56 AM.  Always use your most recent med list.  
  
  
  
  
 albuterol 90 mcg/actuation inhaler Commonly known as:  PROVENTIL HFA, VENTOLIN HFA, PROAIR HFA Take 2 Puffs by inhalation every four (4) hours as needed for Wheezing. amLODIPine 5 mg tablet Commonly known as:  Arlyss Bran TAKE 1 TABLET EVERY DAY  
  
 aspirin delayed-release 81 mg tablet Take 1 Tab by mouth daily. Indications: prevent stroke, heart attack  
  
 atorvastatin 20 mg tablet Commonly known as:  LIPITOR  
TAKE 1 TABLET DAILY FOR CHOLESTEROL AND HEART  
  
 lisinopril 10 mg tablet Commonly known as:  PRINIVIL, ZESTRIL  
TAKE 1 TABLET EVERY DAY  Indications: pressure, new higher dose. traMADol 50 mg tablet Commonly known as:  ULTRAM  
Take 1 Tab by mouth every six (6) hours as needed for Pain. Max Daily Amount: 200 mg. Prescriptions Printed Refills  
 traMADol (ULTRAM) 50 mg tablet 1 Sig: Take 1 Tab by mouth every six (6) hours as needed for Pain. Max Daily Amount: 200 mg. Class: Print Route: Oral  
  
Prescriptions Sent to Pharmacy Refills  
 lisinopril (PRINIVIL, ZESTRIL) 10 mg tablet 3 Sig: TAKE 1 TABLET EVERY DAY  Indications: pressure, new higher dose. Class: Normal  
 Pharmacy: [address] [place] [address] Ph #: [phone] We Performed the Following ADMIN INFLUENZA VIRUS VAC [ HCP] HCV AB W/RFLX TO TALITA [78256 CPT(R)] INFLUENZA VIRUS VAC QUAD,SPLIT,PRESV FREE SYRINGE IM [id number] CPT(R)] LIPID PANEL [75051 CPT(R)] METABOLIC PANEL, BASIC [68069 CPT(R)] Introducing [facility] & HEALTH SERVICES! Dear Stan Sorto: Thank you for requesting a Biofuelbox account. Our records indicate that you already have an active Biofuelbox account.   You can access your account anytime at [email or web address] Did you know that you can access your hospital and ER discharge instructions at any time in WebEx Communications? You can also review all of your test results from your hospital stay or ER visit. Additional Information If you have questions, please visit the Frequently Asked Questions section of the WebEx Communications website at [email or web address] Remember, WebEx Communications is NOT to be used for urgent needs. For medical emergencies, dial 911. Now available from your iPhone and Android! Please provide this summary of care documentation to your next provider. Your primary care clinician is listed as Shefali Marte. If you have any questions after today's visit, please call [phone].

## 2017-10-17 LAB
BUN SERPL-MCNC: 16 MG/DL (ref 8–27)
BUN/CREAT SERPL: 21 (ref 10–24)
CALCIUM SERPL-MCNC: 9.7 MG/DL (ref 8.6–10.2)
CHLORIDE SERPL-SCNC: 101 MMOL/L (ref 96–106)
CHOLEST SERPL-MCNC: 136 MG/DL (ref 100–199)
CO2 SERPL-SCNC: 25 MMOL/L (ref 18–29)
CREAT SERPL-MCNC: 0.78 MG/DL (ref 0.76–1.27)
GLUCOSE SERPL-MCNC: 75 MG/DL (ref 65–99)
HCV AB S/CO SERPL IA: 0.6 S/CO RATIO (ref 0–0.9)
HCV AB SERPL QL IA: NORMAL
HDLC SERPL-MCNC: 47 MG/DL
LDLC SERPL CALC-MCNC: 74 MG/DL (ref 0–99)
POTASSIUM SERPL-SCNC: 4.1 MMOL/L (ref 3.5–5.2)
SODIUM SERPL-SCNC: 142 MMOL/L (ref 134–144)
TRIGL SERPL-MCNC: 77 MG/DL (ref 0–149)
VLDLC SERPL CALC-MCNC: 15 MG/DL (ref 5–40)

## 2018-01-15 ENCOUNTER — OFFICE VISIT (OUTPATIENT)
Dept: FAMILY MEDICINE CLINIC | Age: 68
End: 2018-01-15

## 2018-01-15 VITALS
DIASTOLIC BLOOD PRESSURE: 64 MMHG | OXYGEN SATURATION: 99 % | SYSTOLIC BLOOD PRESSURE: 134 MMHG | WEIGHT: 156 LBS | HEART RATE: 85 BPM | BODY MASS INDEX: 23.64 KG/M2 | TEMPERATURE: 99.1 F | HEIGHT: 68 IN

## 2018-01-15 DIAGNOSIS — F17.200 SMOKING: ICD-10-CM

## 2018-01-15 DIAGNOSIS — Z13.31 SCREENING FOR DEPRESSION: ICD-10-CM

## 2018-01-15 DIAGNOSIS — Z00.00 MEDICARE ANNUAL WELLNESS VISIT, SUBSEQUENT: Primary | ICD-10-CM

## 2018-01-15 DIAGNOSIS — N20.0 NEPHROLITHIASIS: ICD-10-CM

## 2018-01-15 DIAGNOSIS — Z23 ENCOUNTER FOR IMMUNIZATION: ICD-10-CM

## 2018-01-15 DIAGNOSIS — E78.00 PURE HYPERCHOLESTEROLEMIA: ICD-10-CM

## 2018-01-15 DIAGNOSIS — Z12.11 SCREENING FOR COLON CANCER: ICD-10-CM

## 2018-01-15 DIAGNOSIS — Z13.39 SCREENING FOR ALCOHOLISM: ICD-10-CM

## 2018-01-15 DIAGNOSIS — I10 ESSENTIAL HYPERTENSION: ICD-10-CM

## 2018-01-15 RX ORDER — TAMSULOSIN HYDROCHLORIDE 0.4 MG/1
0.4 CAPSULE ORAL DAILY
Qty: 30 CAP | Refills: 4 | Status: SHIPPED | OUTPATIENT
Start: 2018-01-15 | End: 2018-03-02 | Stop reason: ALTCHOICE

## 2018-01-15 RX ORDER — VARENICLINE TARTRATE 1 MG/1
TABLET, FILM COATED ORAL
Qty: 60 TAB | Refills: 2 | Status: SHIPPED | OUTPATIENT
Start: 2018-01-15 | End: 2018-01-31 | Stop reason: ALTCHOICE

## 2018-01-15 NOTE — PROGRESS NOTES
Facundo Horvath is a 79 y.o. male presenting for/with: Annual Wellness Visit; Advance Care Planning; Cough; URI (congested); and Kidney Stone    HPI:  Nephrolithiasis, recurrent  Having another spell x 2 days. Pain moderately bothersome, controlled with tramadol. Not drinking enough fluids lately. Flomax helped last time    Increased stress. Doing better since last visit. Enjoying getting out hunting deer, ducks, and geese. Daughter and grandchild, Daughter's boyfriend all living with pt. Stressful. PHQ over the last two weeks 1/15/2018   PHQ Not Done -   Little interest or pleasure in doing things Not at all   Feeling down, depressed or hopeless Not at all   Total Score PHQ 2 0     Hypertension. Blood pressures have been better. Management at last visit included con't add lisinporil 5 qd. Current regimen: ACEI and calcium channel blocker. Symptoms include no symptoms. Patient denies chest pain, palpitations, peripheral edema. Lab review:   Lab Results   Component Value Date/Time    Sodium 142 10/16/2017 09:48 AM    Potassium 4.1 10/16/2017 09:48 AM    Chloride 101 10/16/2017 09:48 AM    CO2 25 10/16/2017 09:48 AM    Glucose 75 10/16/2017 09:48 AM    BUN 16 10/16/2017 09:48 AM    Creatinine 0.78 10/16/2017 09:48 AM    BUN/Creatinine ratio 21 10/16/2017 09:48 AM    GFR est  10/16/2017 09:48 AM    GFR est non-AA 93 10/16/2017 09:48 AM    Calcium 9.7 10/16/2017 09:48 AM     Hyperlipidemia. On lipitor 20 now. Francis well. No myalgias, arthralgias, unusual weakness. Lab Results   Component Value Date/Time    Cholesterol, total 136 10/16/2017 09:48 AM    HDL Cholesterol 47 10/16/2017 09:48 AM    LDL, calculated 74 10/16/2017 09:48 AM    VLDL, calculated 15 10/16/2017 09:48 AM    Triglyceride 77 10/16/2017 09:48 AM     Lab Results   Component Value Date/Time    ALT (SGPT) 16 12/01/2016 10:56 AM    AST (SGOT) 17 12/01/2016 10:56 AM    Alk.  phosphatase 68 12/01/2016 10:56 AM    Bilirubin, total 0.2 12/01/2016 10:56 AM     DJD  Off diclofenac and on Tramadol, mainly due to nephrolithiasis. Con't using tramadol very sparingly, still has refill from October order. PMH, SH, Medications/Allergies: reviewed, on chart. ROS:  Constitutional: No fever, chills or weight loss  Respiratory: No cough, SOB   CV: No chest pain or Palpitations    Visit Vitals    /64    Pulse 85    Temp 99.1 °F (37.3 °C) (Oral)    Ht 5' 8\" (1.727 m)    Wt 156 lb (70.8 kg)    SpO2 99%    BMI 23.72 kg/m2     Wt Readings from Last 3 Encounters:   01/15/18 156 lb (70.8 kg)   10/16/17 158 lb (71.7 kg)   04/13/17 158 lb (71.7 kg)   -2#  BP Readings from Last 3 Encounters:   01/15/18 134/64   10/16/17 152/66   04/13/17 140/59     Physical Examination: General appearance - alert, well appearing, and in no distress  Mental status - alert, oriented to person, place, and time  Eyes - pupils equal and reactive, extraocular eye movements intact  ENT - bilateral external ears and nose normal. Normal lips. OP shows resolved gum whitening. Neck - supple, no significant adenopathy, no thyromegaly or mass  Lymphatics - no palpable lymphadenopathy, no hepatosplenomegaly  Chest - clear to auscultation, no wheezes, rales or rhonchi, symmetric air entry  Heart - normal rate, regular rhythm, normal S1, S2, no murmurs, rubs, clicks or gallops  Extremities - peripheral pulses normal, no pedal edema, no clubbing or cyanosis    A/p:  R nephrolithiasis  Recurrent. Current sx started 2 days ago. Start flomax, con't tramadol, cranberry, and  con't azo. HTN  A little worse, maybe due to nephrolithiasis. Recheck BMP for GFR. Con't norvasc 5mg every day and ACEI lisinopril 10mg daily. HLD and ASCVD  High risk, 32.7% 10 yr ASCVD risk. Doing ok on lipitor 20 and ASA. Chart review shows aortic atherosclerosis on an old spine series. BPs, lipids well controlled, con't.     DJD knee, L-spine  Can RF tramadol 50, half pill + APAP 1-2 tabs BID on request.  reviewed    Smoking and cough  Again Discussed cessation. Pt under a lot of stress. Will consider quitting sometime soon. Sample dulera given again. Adjustment d/o  Con't cognitive measures. F/U 3mo,     ______________________________________________________________________    Sudheer Luciano is a 79 y.o. male and presents for annual Medicare Wellness Visit. Problem List: Reviewed with patient and discussed risk factors. Patient Active Problem List   Diagnosis Code    Degenerative lumbar disc M51.36    Essential hypertension I10    Nephrolithiasis N20.0    Smoking F17.200    Pure hypercholesterolemia E78.00    Post-traumatic osteoarthritis of left knee M17.32    ASCVD (arteriosclerotic cardiovascular disease) I25.10       Current medical providers:  Patient Care Team:  Fidel Cogan, MD as PCP - General (Family Practice)    PMH, , Medications/Allergies: reviewed, on chart. Male Alcohol Screening: On any occasion during the past 3 months, have you had more than 4 drinks containing alcohol? No  Do you average more than 14 drinks per week? No    ROS:  Constitutional: No fever, chills or weight loss  Respiratory: No cough, SOB   CV: No chest pain or Palpitations    Objective:  Visit Vitals    /64    Pulse 85    Temp 99.1 °F (37.3 °C) (Oral)    Ht 5' 8\" (1.727 m)    Wt 156 lb (70.8 kg)    SpO2 99%    BMI 23.72 kg/m2    Body mass index is 23.72 kg/(m^2).     Assessment of cognitive impairment: Alert and oriented x 3    Depression Screen:   PHQ over the last two weeks 1/15/2018   PHQ Not Done -   Little interest or pleasure in doing things Not at all   Feeling down, depressed or hopeless Not at all   Total Score PHQ 2 0   Trouble falling or staying asleep, or sleeping too much -   Feeling tired or having little energy -   Poor appetite or overeating -   Feeling bad about yourself - or that you are a failure or have let yourself or your family down -   Trouble concentrating on things such as school, work, reading or watching TV -   Moving or speaking so slowly that other people could have noticed; or the opposite being so fidgety that others notice -   Thoughts of being better off dead, or hurting yourself in some way -   PHQ 9 Score -   How difficult have these problems made it for you to do your work, take care of your home and get along with others -       Fall Risk Assessment:    Fall Risk Assessment, last 12 mths 1/15/2018   Able to walk? Yes   Fall in past 12 months? No       Functional Ability:   Does the patient exhibit a steady gait? yes   How long did it take the patient to get up and walk from a sitting position? 1s   Is the patient self reliant?  (ie can do own laundry, meals, household chores)  yes     Does the patient handle his/her own medications? yes     Does the patient handle his/her own money? yes     Is the patients home safe (ie good lighting, handrails on stairs and bath, etc.)? yes     Did you notice or did patient express any hearing difficulties? no     Did you notice or did patient express any vision difficulties? no       Advance Care Planning:   Patient was offered the opportunity to discuss advance care planning:  yes     Does patient have an Advance Directive:  no   If no, did you provide information on Caring Connections? yes     Plan:      HCM  FIT kit given. Hep C screen negative/normal 2017. Health Maintenance   Topic Date Due    FOBT Q 1 YEAR AGE 50-75  05/24/2000    Pneumococcal 65+ Low/Medium Risk (2 of 2 - PPSV23) 01/13/2018    MEDICARE YEARLY EXAM  01/14/2018    GLAUCOMA SCREENING Q2Y  11/12/2018    DTaP/Tdap/Td series (2 - Td) 10/16/2027    Hepatitis C Screening  Completed    ZOSTER VACCINE AGE 60>  Addressed    Influenza Age 5 to Adult  Completed     *Patient verbalized understanding and agreement with the plan. A copy of the After Visit Summary with personalized health plan was given to the patient today.

## 2018-01-15 NOTE — MR AVS SNAPSHOT
Visit Information Date & Time Provider Department Dept. Phone Encounter #  
 1/15/2018  7:50 AM Lesvia Serrato MD [address] [place] [address] [id number] Follow-up Instructions Return in about 2 weeks (around 1/29/2018). Follow-up and Disposition History Upcoming Health Maintenance Date Due FOBT Q 1 YEAR AGE 50-75 5/24/2000 Pneumococcal 65+ Low/Medium Risk (2 of 2 - PPSV23) 1/13/2018 MEDICARE YEARLY EXAM 1/14/2018 GLAUCOMA SCREENING Q2Y 11/12/2018 DTaP/Tdap/Td series (2 - Td) 10/16/2027 Allergies as of 1/15/2018  Review Complete On: 1/15/2018 By: Lesvia Serraot MD  
  
 Severity Noted Reaction Type Reactions Penicillins  12/01/2016    Rash Current Immunizations  Never Reviewed Name Date Influenza Vaccine 11/1/2016 Influenza Vaccine (Quad) PF 10/16/2017 Pneumococcal Conjugate (PCV-13) 1/13/2017 12:32 PM  
  
 Not reviewed this visit You Were Diagnosed With   
  
 Codes Comments Medicare annual wellness visit, subsequent    -  Primary ICD-10-CM: Z00.00 ICD-9-CM: V70.0 Encounter for immunization     ICD-10-CM: J54 ICD-9-CM: V03.89 Pure hypercholesterolemia     ICD-10-CM: E78.00 ICD-9-CM: 272.0 Essential hypertension     ICD-10-CM: I10 
ICD-9-CM: 401.9 Smoking     ICD-10-CM: F17.200 ICD-9-CM: 305.1 Nephrolithiasis     ICD-10-CM: N20.0 ICD-9-CM: 592.0 Screening for alcoholism     ICD-10-CM: Z13.89 ICD-9-CM: V79.1 Screening for depression     ICD-10-CM: Z13.89 ICD-9-CM: V79.0 Vitals BP Pulse Temp Height(growth percentile) Weight(growth percentile) SpO2  
 134/64 85 99.1 °F (37.3 °C) (Oral) 5' 8\" (1.727 m) 156 lb (70.8 kg) 99% BMI Smoking Status 23.72 kg/m2 Current Every Day Smoker BMI and BSA Data Body Mass Index Body Surface Area  
 23.72 kg/m 2 1.84 m 2 Preferred Pharmacy Pharmacy Name Phone [address] [phone] Your Updated Medication List  
  
   
This list is accurate as of: 1/15/18  8:57 AM.  Always use your most recent med list.  
  
  
  
  
 albuterol 90 mcg/actuation inhaler Commonly known as:  PROVENTIL HFA, VENTOLIN HFA, PROAIR HFA Take 2 Puffs by inhalation every four (4) hours as needed for Wheezing. amLODIPine 5 mg tablet Commonly known as:  Ana Hensen TAKE 1 TABLET EVERY DAY  
  
 aspirin delayed-release 81 mg tablet Take 1 Tab by mouth daily. Indications: prevent stroke, heart attack  
  
 atorvastatin 20 mg tablet Commonly known as:  LIPITOR  
TAKE 1 TABLET DAILY FOR CHOLESTEROL AND HEART  
  
 lisinopril 10 mg tablet Commonly known as:  PRINIVIL, ZESTRIL  
TAKE 1 TABLET EVERY DAY  Indications: pressure, new higher dose. tamsulosin 0.4 mg capsule Commonly known as:  FLOMAX Take 1 Cap by mouth daily. Indications: bladder and prostate  
  
 traMADol 50 mg tablet Commonly known as:  ULTRAM  
Take 1 Tab by mouth every six (6) hours as needed for Pain. Max Daily Amount: 200 mg.  
  
 varenicline 1 mg tablet Commonly known as:  CHANTIX  
1/2 tab qhs for 3 days, then 1/2 tab BID for 3 days, then 1 po BID to help quit smoking Prescriptions Sent to Pharmacy Refills  
 tamsulosin (FLOMAX) 0.4 mg capsule 4 Sig: Take 1 Cap by mouth daily. Indications: bladder and prostate Class: Normal  
 Pharmacy: [facility] [address] Ph #: [phone] Route: Oral  
 varenicline (CHANTIX) 1 mg tablet 2 Si/2 tab qhs for 3 days, then 1/2 tab BID for 3 days, then 1 po BID to help quit smoking Class: Normal  
 Pharmacy: [facility] [address]ena Ph #: [phone] We Performed the Following [address] [[id number] [facility]] [place] ANNUAL ALCOHOL SCREEN 15 MIN [id number] [facility]] Follow-up Instructions Return in about 2 weeks (around 1/29/2018). Patient Instructions Kidney Stone: Care Instructions Your Care Instructions Kidney stones are formed when salts, minerals, and other substances normally found in the urine clump together. They can be as small as grains of sand or, rarely, as large as golf balls. While the stone is traveling through the ureter, which is the tube that carries urine from the kidney to the bladder, you will probably feel pain. The pain may be mild or very severe. You may also have some blood in your urine. As soon as the stone reaches the bladder, any intense pain should go away. If a stone is too large to pass on its own, you may need a medical procedure to help you pass the stone. The doctor has checked you carefully, but problems can develop later. If you notice any problems or new symptoms, get medical treatment right away. Follow-up care is a key part of your treatment and safety. Be sure to make and go to all appointments, and call your doctor if you are having problems. It's also a good idea to know your test results and keep a list of the medicines you take. How can you care for yourself at home? · Drink plenty of fluids, enough so that your urine is light yellow or clear like water. If you have kidney, heart, or liver disease and have to limit fluids, talk with your doctor before you increase the amount of fluids you drink. · Take pain medicines exactly as directed. Call your doctor if you think you are having a problem with your medicine. ¨ If the doctor gave you a prescription medicine for pain, take it as prescribed. ¨ If you are not taking a prescription pain medicine, ask your doctor if you can take an over-the-counter medicine. Read and follow all instructions on the label. · Your doctor may ask you to strain your urine so that you can collect your kidney stone when it passes.  You can use a kitchen strainer or a tea strainer to catch the stone. Store it in a plastic bag until you see your doctor again. Preventing future kidney stones Some changes in your diet may help prevent kidney stones. Depending on the cause of your stones, your doctor may recommend that you: · Drink plenty of fluids, enough so that your urine is light yellow or clear like water. If you have kidney, heart, or liver disease and have to limit fluids, talk with your doctor before you increase the amount of fluids you drink. · Limit coffee, tea, and alcohol. Also avoid grapefruit juice. · Do not take more than the recommended daily dose of vitamins C and D. 
· Avoid antacids such as Gaviscon, Maalox, Mylanta, or Tums. · Limit the amount of salt (sodium) in your diet. · Eat a balanced diet that is not too high in protein. · Limit foods that are high in a substance called oxalate, which can cause kidney stones. These foods include dark green vegetables, rhubarb, chocolate, wheat bran, nuts, cranberries, and beans. When should you call for help? Call your doctor now or seek immediate medical care if: 
? · You cannot keep down fluids. ? · Your pain gets worse. ? · You have a fever or chills. ? · You have new or worse pain in your back just below your rib cage (the flank area). ? · You have new or more blood in your urine. ? Watch closely for changes in your health, and be sure to contact your doctor if: 
? · You do not get better as expected. Where can you learn more? Go to http://shellie-easton.info/. Enter R492 in the search box to learn more about \"Kidney Stone: Care Instructions. \" Current as of: May 12, 2017 Content Version: 11.4 © 2946-9109 [employer]. Care instructions adapted under license by [employer] (which disclaims liability or warranty for this information).  If you have questions about a medical condition or this instruction, always ask your healthcare professional. [employer] disclaims any warranty or liability for your use of this information. Medicare Wellness Visit, Male The best way to live healthy is to have a healthy lifestyle by eating a well-balanced diet, exercising regularly, limiting alcohol and stopping smoking. Regular physical exams and screening tests are another way to keep healthy. Preventive exams provided by your health care provider can find health problems before they become diseases or illnesses. Preventive services including immunizations, screening tests, monitoring and exams can help you take care of your own health. All people over age 72 should have a pneumovax  and and a prevnar shot to prevent pneumonia. These are once in a lifetime unless you and your provider decide differently. All people over 65 should have a yearly flu shot and a tetanus vaccine every 10 years. Screening for diabetes mellitus with a blood sugar test should be done every year. Glaucoma is a disease of the eye due to increased ocular pressure that can lead to blindness and it should be done every year by an eye professional. 
 
Cardiovascular screening tests that check for elevated lipids (fatty part of blood) which can lead to heart disease and strokes should be done every 5 years. Colorectal screening that evaluates for blood or polyps in your colon should be done yearly as a stool test or every five years as a flexible sigmoidoscope or every 10 years as a colonoscopy up to age 76. Men up to age 76 may need a screening blood test for prostate cancer at certain intervals, depending on their personal and family history. This decision is between the patient and his provider. If you have been a smoker or had family history of abdominal aortic aneurysms, you and your provider may decide to schedule an ultrasound test of your aorta. Hepatitis C screening is also recommended for anyone born between [birth year] through [address]. A shingles vaccine is also recommended once in a lifetime after age 61. Your Medicare Wellness Exam is recommended annually. Here is a list of your current Health Maintenance items with a due date: 
Health Maintenance Due Topic Date Due  Stool testing for trace blood  05/24/2000  Pneumococcal Vaccine (2 of 2 - PPSV23) 01/13/2018 [facility] Annual Well Visit  01/14/2018 Patient Instructions History Introducing [facility]! Dear Libby Agent: Thank you for requesting a [employer] account. Our records indicate that you already have an active [employer] account. You can access your account anytime at [email or web address] Did you know that you can access your hospital and ER discharge instructions at any time in [employer]? You can also review all of your test results from your hospital stay or ER visit. Additional Information If you have questions, please visit the Frequently Asked Questions section of the [employer] website at [email or web address] Remember, [employer] is NOT to be used for urgent needs. For medical emergencies, dial 911. Now available from your iPhone and Android! Please provide this summary of care documentation to your next provider. Your primary care clinician is listed as Antonia Marte. If you have any questions after today's visit, please call [phone].

## 2018-01-15 NOTE — PATIENT INSTRUCTIONS
Kidney Stone: Care Instructions  Your Care Instructions    Kidney stones are formed when salts, minerals, and other substances normally found in the urine clump together. They can be as small as grains of sand or, rarely, as large as golf balls. While the stone is traveling through the ureter, which is the tube that carries urine from the kidney to the bladder, you will probably feel pain. The pain may be mild or very severe. You may also have some blood in your urine. As soon as the stone reaches the bladder, any intense pain should go away. If a stone is too large to pass on its own, you may need a medical procedure to help you pass the stone. The doctor has checked you carefully, but problems can develop later. If you notice any problems or new symptoms, get medical treatment right away. Follow-up care is a key part of your treatment and safety. Be sure to make and go to all appointments, and call your doctor if you are having problems. It's also a good idea to know your test results and keep a list of the medicines you take. How can you care for yourself at home? · Drink plenty of fluids, enough so that your urine is light yellow or clear like water. If you have kidney, heart, or liver disease and have to limit fluids, talk with your doctor before you increase the amount of fluids you drink. · Take pain medicines exactly as directed. Call your doctor if you think you are having a problem with your medicine. ¨ If the doctor gave you a prescription medicine for pain, take it as prescribed. ¨ If you are not taking a prescription pain medicine, ask your doctor if you can take an over-the-counter medicine. Read and follow all instructions on the label. · Your doctor may ask you to strain your urine so that you can collect your kidney stone when it passes. You can use a kitchen strainer or a tea strainer to catch the stone. Store it in a plastic bag until you see your doctor again.   Preventing future kidney stones  Some changes in your diet may help prevent kidney stones. Depending on the cause of your stones, your doctor may recommend that you:  · Drink plenty of fluids, enough so that your urine is light yellow or clear like water. If you have kidney, heart, or liver disease and have to limit fluids, talk with your doctor before you increase the amount of fluids you drink. · Limit coffee, tea, and alcohol. Also avoid grapefruit juice. · Do not take more than the recommended daily dose of vitamins C and D.  · Avoid antacids such as Gaviscon, Maalox, Mylanta, or Tums. · Limit the amount of salt (sodium) in your diet. · Eat a balanced diet that is not too high in protein. · Limit foods that are high in a substance called oxalate, which can cause kidney stones. These foods include dark green vegetables, rhubarb, chocolate, wheat bran, nuts, cranberries, and beans. When should you call for help? Call your doctor now or seek immediate medical care if:  ? · You cannot keep down fluids. ? · Your pain gets worse. ? · You have a fever or chills. ? · You have new or worse pain in your back just below your rib cage (the flank area). ? · You have new or more blood in your urine. ? Watch closely for changes in your health, and be sure to contact your doctor if:  ? · You do not get better as expected. Where can you learn more? Go to http://shellie-easton.info/. Enter I622 in the search box to learn more about \"Kidney Stone: Care Instructions. \"  Current as of: May 12, 2017  Content Version: 11.4  © 8176-9424 [employer]. Care instructions adapted under license by [employer] (which disclaims liability or warranty for this information). If you have questions about a medical condition or this instruction, always ask your healthcare professional. [address] any warranty or liability for your use of this information.       Medicare Wellness Visit, Male    The best way to live healthy is to have a healthy lifestyle by eating a well-balanced diet, exercising regularly, limiting alcohol and stopping smoking. Regular physical exams and screening tests are another way to keep healthy. Preventive exams provided by your health care provider can find health problems before they become diseases or illnesses. Preventive services including immunizations, screening tests, monitoring and exams can help you take care of your own health. All people over age 72 should have a pneumovax  and and a prevnar shot to prevent pneumonia. These are once in a lifetime unless you and your provider decide differently. All people over 65 should have a yearly flu shot and a tetanus vaccine every 10 years. Screening for diabetes mellitus with a blood sugar test should be done every year. Glaucoma is a disease of the eye due to increased ocular pressure that can lead to blindness and it should be done every year by an eye professional.    Cardiovascular screening tests that check for elevated lipids (fatty part of blood) which can lead to heart disease and strokes should be done every 5 years. Colorectal screening that evaluates for blood or polyps in your colon should be done yearly as a stool test or every five years as a flexible sigmoidoscope or every 10 years as a colonoscopy up to age 76. Men up to age 76 may need a screening blood test for prostate cancer at certain intervals, depending on their personal and family history. This decision is between the patient and his provider. If you have been a smoker or had family history of abdominal aortic aneurysms, you and your provider may decide to schedule an ultrasound test of your aorta. Hepatitis C screening is also recommended for anyone born between [birth year] through [address]. A shingles vaccine is also recommended once in a lifetime after age 61. Your Medicare Wellness Exam is recommended annually.     Here is a list of your current Health Maintenance items with a due date:  Health Maintenance Due   Topic Date Due    Stool testing for trace blood  05/24/2000    Pneumococcal Vaccine (2 of 2 - PPSV23) 01/13/2018    Annual Well Visit  01/14/2018

## 2018-01-15 NOTE — PROGRESS NOTES
Salvador Hamlin is a 79 y.o. male and presents for annual Medicare Wellness Visit. Problem List: Reviewed with patient and discussed risk factors. Patient Active Problem List   Diagnosis Code    Degenerative lumbar disc M51.36    Essential hypertension I10    Nephrolithiasis N20.0    Smoking F17.200    Pure hypercholesterolemia E78.00    Post-traumatic osteoarthritis of left knee M17.32    ASCVD (arteriosclerotic cardiovascular disease) I25.10       Current medical providers:  Patient Care Team:  Luis Merritt MD as PCP - General (Family Practice)    PSH: Reviewed with patient  No past surgical history on file. SH: Reviewed with patient  Social History   Substance Use Topics    Smoking status: Current Every Day Smoker     Types: Cigarettes    Smokeless tobacco: Never Used    Alcohol use No       FH: Reviewed with patient  No family history on file. Medications/Allergies: Reviewed with patient  Current Outpatient Prescriptions on File Prior to Visit   Medication Sig Dispense Refill    lisinopril (PRINIVIL, ZESTRIL) 10 mg tablet TAKE 1 TABLET EVERY DAY  Indications: pressure, new higher dose. 90 Tab 3    traMADol (ULTRAM) 50 mg tablet Take 1 Tab by mouth every six (6) hours as needed for Pain. Max Daily Amount: 200 mg. 45 Tab 1    amLODIPine (NORVASC) 5 mg tablet TAKE 1 TABLET EVERY DAY 90 Tab 3    atorvastatin (LIPITOR) 20 mg tablet TAKE 1 TABLET DAILY FOR CHOLESTEROL AND HEART 90 Tab 3    albuterol (PROVENTIL HFA, VENTOLIN HFA, PROAIR HFA) 90 mcg/actuation inhaler Take 2 Puffs by inhalation every four (4) hours as needed for Wheezing. 1 Inhaler 2    aspirin delayed-release 81 mg tablet Take 1 Tab by mouth daily. Indications: prevent stroke, heart attack 100 Tab 3     No current facility-administered medications on file prior to visit.        Allergies   Allergen Reactions    Penicillins Rash       Objective:  Visit Vitals    /64    Pulse 85    Temp 99.1 °F (37.3 °C) (Oral)    Ht 5' 8\" (1.727 m)    Wt 156 lb (70.8 kg)    SpO2 99%    BMI 23.72 kg/m2    Body mass index is 23.72 kg/(m^2). Assessment of cognitive impairment: Alert and oriented x 3    Depression Screen:   PHQ over the last two weeks 1/15/2018   PHQ Not Done -   Little interest or pleasure in doing things Not at all   Feeling down, depressed or hopeless Not at all   Total Score PHQ 2 0   Trouble falling or staying asleep, or sleeping too much -   Feeling tired or having little energy -   Poor appetite or overeating -   Feeling bad about yourself - or that you are a failure or have let yourself or your family down -   Trouble concentrating on things such as school, work, reading or watching TV -   Moving or speaking so slowly that other people could have noticed; or the opposite being so fidgety that others notice -   Thoughts of being better off dead, or hurting yourself in some way -   PHQ 9 Score -   How difficult have these problems made it for you to do your work, take care of your home and get along with others -       Fall Risk Assessment:    Fall Risk Assessment, last 12 mths 1/15/2018   Able to walk? Yes   Fall in past 12 months? No       Functional Ability:   Does the patient exhibit a steady gait? yes   How long did it take the patient to get up and walk from a sitting position? 1   Is the patient self reliant?  (ie can do own laundry, meals, household chores)  yes     Does the patient handle his/her own medications? yes     Does the patient handle his/her own money? yes     Is the patients home safe (ie good lighting, handrails on stairs and bath, etc.)? yes     Did you notice or did patient express any hearing difficulties? no     Did you notice or did patient express any vision difficulties?   no     Were distance and reading eye charts used?   no       Advance Care Planning:   Patient was offered the opportunity to discuss advance care planning:  yes     Does patient have an Advance Directive:  no   If no, did you provide information on Caring Connections? yes       Plan:      No orders of the defined types were placed in this encounter. Health Maintenance   Topic Date Due    FOBT Q 1 YEAR AGE 50-75  05/24/2000    Pneumococcal 65+ Low/Medium Risk (2 of 2 - PPSV23) 01/13/2018    MEDICARE YEARLY EXAM  01/14/2018    GLAUCOMA SCREENING Q2Y  11/12/2018    DTaP/Tdap/Td series (2 - Td) 10/16/2027    Hepatitis C Screening  Completed    ZOSTER VACCINE AGE 60>  Addressed    Influenza Age 5 to Adult  Completed       *Patient verbalized understanding and agreement with the plan. A copy of the After Visit Summary with personalized health plan was given to the patient today.

## 2018-01-28 LAB — HEMOCCULT STL QL IA: NEGATIVE

## 2018-01-31 ENCOUNTER — OFFICE VISIT (OUTPATIENT)
Dept: FAMILY MEDICINE CLINIC | Age: 68
End: 2018-01-31

## 2018-01-31 VITALS
HEIGHT: 68 IN | OXYGEN SATURATION: 98 % | BODY MASS INDEX: 23.49 KG/M2 | WEIGHT: 155 LBS | TEMPERATURE: 98.7 F | SYSTOLIC BLOOD PRESSURE: 150 MMHG | HEART RATE: 77 BPM | DIASTOLIC BLOOD PRESSURE: 48 MMHG

## 2018-01-31 DIAGNOSIS — I10 ESSENTIAL HYPERTENSION: ICD-10-CM

## 2018-01-31 DIAGNOSIS — F17.200 SMOKING: ICD-10-CM

## 2018-01-31 DIAGNOSIS — I25.10 ASCVD (ARTERIOSCLEROTIC CARDIOVASCULAR DISEASE): Primary | ICD-10-CM

## 2018-01-31 DIAGNOSIS — Z23 ENCOUNTER FOR IMMUNIZATION: ICD-10-CM

## 2018-01-31 DIAGNOSIS — M51.36 DEGENERATIVE LUMBAR DISC: ICD-10-CM

## 2018-01-31 DIAGNOSIS — E78.00 PURE HYPERCHOLESTEROLEMIA: ICD-10-CM

## 2018-01-31 DIAGNOSIS — N20.0 NEPHROLITHIASIS: ICD-10-CM

## 2018-01-31 DIAGNOSIS — M47.816 SPONDYLOSIS OF LUMBAR REGION WITHOUT MYELOPATHY OR RADICULOPATHY: ICD-10-CM

## 2018-01-31 RX ORDER — BUPROPION HYDROCHLORIDE 150 MG/1
TABLET, EXTENDED RELEASE ORAL
Qty: 60 TAB | Refills: 2 | Status: SHIPPED | OUTPATIENT
Start: 2018-01-31 | End: 2018-07-02 | Stop reason: SDUPTHER

## 2018-01-31 RX ORDER — LISINOPRIL 20 MG/1
TABLET ORAL
Qty: 90 TAB | Refills: 3 | Status: SHIPPED | OUTPATIENT
Start: 2018-01-31 | End: 2018-11-06 | Stop reason: SDUPTHER

## 2018-01-31 RX ORDER — TRAMADOL HYDROCHLORIDE 50 MG/1
50 TABLET ORAL
Qty: 45 TAB | Refills: 1 | Status: SHIPPED | OUTPATIENT
Start: 2018-01-31 | End: 2018-05-10 | Stop reason: SDUPTHER

## 2018-01-31 NOTE — PROGRESS NOTES
Salvador Hamlin is a 79 y.o. male presenting for/with:    Urinary Pain (follow up)    HPI:  Nephrolithiasis, recurrent  Finished latest spell last week. Flomax helped. Hypertension. Blood pressures have been better. Management at last visit included boost to lisinopril 10mg every day. Current regimen: ACEI and calcium channel blocker. Symptoms include no symptoms. Patient denies chest pain, palpitations, peripheral edema. Lab review:   Lab Results   Component Value Date/Time    Sodium 142 10/16/2017 09:48 AM    Potassium 4.1 10/16/2017 09:48 AM    Chloride 101 10/16/2017 09:48 AM    CO2 25 10/16/2017 09:48 AM    Glucose 75 10/16/2017 09:48 AM    BUN 16 10/16/2017 09:48 AM    Creatinine 0.78 10/16/2017 09:48 AM    BUN/Creatinine ratio 21 10/16/2017 09:48 AM    GFR est  10/16/2017 09:48 AM    GFR est non-AA 93 10/16/2017 09:48 AM    Calcium 9.7 10/16/2017 09:48 AM     Hyperlipidemia. On lipitor 20. Francis well. No myalgias, arthralgias, unusual weakness. Lab Results   Component Value Date/Time    Cholesterol, total 136 10/16/2017 09:48 AM    HDL Cholesterol 47 10/16/2017 09:48 AM    LDL, calculated 74 10/16/2017 09:48 AM    VLDL, calculated 15 10/16/2017 09:48 AM    Triglyceride 77 10/16/2017 09:48 AM     Lab Results   Component Value Date/Time    ALT (SGPT) 16 12/01/2016 10:56 AM    AST (SGOT) 17 12/01/2016 10:56 AM    Alk. phosphatase 68 12/01/2016 10:56 AM    Bilirubin, total 0.2 12/01/2016 10:56 AM     DJD  Doing well on Tramadol. using tramadol very sparingly, just half through refill from fall, filled 1/2018. PMH, SH, Medications/Allergies: reviewed, on chart.     ROS:  Constitutional: No fever, chills or weight loss  Respiratory: No cough, SOB   CV: No chest pain or Palpitations    Visit Vitals    /48    Pulse 77    Temp 98.7 °F (37.1 °C) (Oral)    Ht 5' 8\" (1.727 m)    Wt 155 lb (70.3 kg)    SpO2 98%    BMI 23.57 kg/m2     Wt Readings from Last 3 Encounters:   01/31/18 155 lb (70.3 kg)   01/15/18 156 lb (70.8 kg)   10/16/17 158 lb (71.7 kg)   -1#  BP Readings from Last 3 Encounters:   01/31/18 150/48   01/15/18 134/64   10/16/17 152/66     Physical Examination: General appearance - alert, well appearing, and in no distress  Mental status - alert, oriented to person, place, and time  Eyes - pupils equal and reactive, extraocular eye movements intact  ENT - bilateral external ears and nose normal. Normal lips. OP shows resolved gum whitening. Neck - supple, no significant adenopathy, no thyromegaly or mass  Lymphatics - no palpable lymphadenopathy, no hepatosplenomegaly  Chest - clear to auscultation, no wheezes, rales or rhonchi, symmetric air entry  Heart - normal rate, regular rhythm, normal S1, S2, no murmurs, rubs, clicks or gallops  Extremities - peripheral pulses normal, no pedal edema, no clubbing or cyanosis    A/p:  R nephrolithiasis  Resolved sx. Monitor. Ok to hold flomax, cranberry, and azo. HTN  Up again. con't norvasc 5mg every day and boost lisinopril to 20mg daily. HLD and ASCVD  High risk, 32.7% 10 yr ASCVD risk. Doing well on lipitor 20 and ASA. Chart review shows aortic atherosclerosis on an old spine series. Work on BP control, lipids well controlled, con't. DJD knee, L-spine  RF tramadol 50, half pill + APAP 1-2 tabs BID. Will set up with Dr. Michael Meng for recheck. Prev eval by Dr. Jim Segovia.  reviewed. Smoking and cough  Again Discussed cessation. Pt cutting back. Will consider quitting sometime soon. Start wellbutrin 150mg qam on week before quit date, then take 150mg BID on quit date and con't until not craving/2-3mo.     F/U 3mo

## 2018-01-31 NOTE — MR AVS SNAPSHOT
[address] [place] [address],[address] [facility] [phone] Patient: Pari Higgins MRN: [id number] Visit Information Date & Time Provider Department Dept. Phone Encounter #  
 1/31/2018 10:50 AM Taras Green MD [address] [place] [address] [id number] Follow-up Instructions Return in about 3 months (around 4/30/2018). Upcoming Health Maintenance Date Due Pneumococcal 65+ Low/Medium Risk (2 of 2 - PPSV23) 1/13/2018 GLAUCOMA SCREENING Q2Y 11/12/2018 MEDICARE YEARLY EXAM 1/16/2019 FOBT Q 1 YEAR AGE 50-75 1/25/2019 DTaP/Tdap/Td series (2 - Td) 10/16/2027 Allergies as of 1/31/2018  Review Complete On: 1/31/2018 By: Taras Green MD  
  
 Severity Noted Reaction Type Reactions Penicillins  12/01/2016    Rash Current Immunizations  Never Reviewed Name Date Influenza Vaccine 11/1/2016 Influenza Vaccine (Quad) PF 10/16/2017 Pneumococcal Conjugate (PCV-13) 1/13/2017 12:32 PM  
 Pneumococcal Polysaccharide (PPSV-23)  Incomplete Not reviewed this visit You Were Diagnosed With   
  
 Codes Comments ASCVD (arteriosclerotic cardiovascular disease)    -  Primary ICD-10-CM: I25.10 ICD-9-CM: 429.2, 440.9 Smoking     ICD-10-CM: F17.200 ICD-9-CM: 305.1 Pure hypercholesterolemia     ICD-10-CM: E78.00 ICD-9-CM: 272.0 Nephrolithiasis     ICD-10-CM: N20.0 ICD-9-CM: 592.0 Essential hypertension     ICD-10-CM: I10 
ICD-9-CM: 401.9 Spondylosis of lumbar region without myelopathy or radiculopathy     ICD-10-CM: M47.816 ICD-9-CM: 721.3 Degenerative lumbar disc     ICD-10-CM: M51.36 
ICD-9-CM: 722.52 Encounter for immunization     ICD-10-CM: I67 ICD-9-CM: V03.89 Vitals BP Pulse Temp Height(growth percentile) Weight(growth percentile) SpO2  
 150/48 77 98.7 °F (37.1 °C) (Oral) 5' 8\" (1.727 m) 155 lb (70.3 kg) 98% BMI Smoking Status 23.57 kg/m2 Current Every Day Smoker BMI and BSA Data Body Mass Index Body Surface Area  
 23.57 kg/m 2 1.84 m 2 Preferred Pharmacy Pharmacy Name Phone [facility] [address] [place], [place] [address] [facility] [address] [place] [address] [phone] Your Updated Medication List  
  
   
This list is accurate as of: 18 11:57 AM.  Always use your most recent med list.  
  
  
  
  
 albuterol 90 mcg/actuation inhaler Commonly known as:  PROVENTIL HFA, VENTOLIN HFA, PROAIR HFA Take 2 Puffs by inhalation every four (4) hours as needed for Wheezing. amLODIPine 5 mg tablet Commonly known as:  Pelon Lords TAKE 1 TABLET EVERY DAY  
  
 aspirin delayed-release 81 mg tablet Take 1 Tab by mouth daily. Indications: prevent stroke, heart attack  
  
 atorvastatin 20 mg tablet Commonly known as:  LIPITOR  
TAKE 1 TABLET DAILY FOR CHOLESTEROL AND HEART  
  
 buPROPion  mg SR tablet Commonly known as:  WELLBUTRIN SR  
1 in AM for 1 week prior to quitting smoking, then 1 AM and 1 in Afternoon to stay quit.  
  
 lisinopril 20 mg tablet Commonly known as:  PRINIVIL, ZESTRIL  
TAKE 1 TABLET EVERY DAY  Indications: pressure, new higher dose. tamsulosin 0.4 mg capsule Commonly known as:  FLOMAX Take 1 Cap by mouth daily. Indications: bladder and prostate  
  
 traMADol 50 mg tablet Commonly known as:  ULTRAM  
Take 1 Tab by mouth every six (6) hours as needed for Pain. Max Daily Amount: 200 mg. Prescriptions Printed Refills  
 traMADol (ULTRAM) 50 mg tablet 1 Sig: Take 1 Tab by mouth every six (6) hours as needed for Pain. Max Daily Amount: 200 mg. Class: Print Route: Oral  
  
Prescriptions Sent to Pharmacy Refills buPROPion SR (WELLBUTRIN SR) 150 mg SR tablet 2 Si in AM for 1 week prior to quitting smoking, then 1 AM and 1 in Afternoon to stay quit.   
 Class: Normal  
 Pharmacy: [address] [place] [address], [address] [place] [address] Ph #: [phone]  
 lisinopril (PRINIVIL, ZESTRIL) 20 mg tablet 3 Sig: TAKE 1 TABLET EVERY DAY  Indications: pressure, new higher dose. Class: Normal  
 Pharmacy: [address] [place] [address], [address] [place] [address] Ph #: [phone] We Performed the Following ADMIN PNEUMOCOCCAL VACCINE [ HCPCS] PNEUMOCOCCAL POLYSACCHARIDE VACCINE, 23-VALENT, ADULT OR IMMUNOSUPPRESSED PT DOSE, [55953 CPT(R)] REFERRAL TO SPINE SURGERY [KXN426 Custom] Comments:  
 [facility] preferred. Pt with DDD, prev tx with with tramadol, interested in eval for injection tx. Prev followed by Dr. Shahid De Oliveira in [place]. Follow-up Instructions Return in about 3 months (around 4/30/2018). Referral Information Referral ID Referred By Referred To [id number] Shivam Fry [phone] [facility] [address] [place], [address] Visits Status Start Date End Date 1 New Request 1/31/18 1/31/19 If your referral has a status of pending review or denied, additional information will be sent to support the outcome of this decision. Introducing [facility]! Dear Libby Agent: Thank you for requesting a [employer] account. Our records indicate that you already have an active [employer] account. You can access your account anytime at [email or web address] Did you know that you can access your hospital and ER discharge instructions at any time in [employer]? You can also review all of your test results from your hospital stay or ER visit. Additional Information If you have questions, please visit the Frequently Asked Questions section of the [employer] website at [email or web address] Remember, [employer] is NOT to be used for urgent needs. For medical emergencies, dial 911. Now available from your iPhone and Android! Please provide this summary of care documentation to your next provider. Your primary care clinician is listed as Deedee Marte. If you have any questions after today's visit, please call [phone].

## 2018-02-07 ENCOUNTER — TELEPHONE (OUTPATIENT)
Dept: FAMILY MEDICINE CLINIC | Age: 68
End: 2018-02-07

## 2018-02-07 NOTE — TELEPHONE ENCOUNTER
Please call. He thinks he's coming to [place] to see Dr. Troy Sandifer. I don't see in the referral if he's to go to [facility] or [place] for this appointment.

## 2018-03-02 ENCOUNTER — OFFICE VISIT (OUTPATIENT)
Dept: FAMILY MEDICINE CLINIC | Age: 68
End: 2018-03-02

## 2018-03-02 VITALS
HEIGHT: 68 IN | BODY MASS INDEX: 23.34 KG/M2 | RESPIRATION RATE: 14 BRPM | OXYGEN SATURATION: 96 % | WEIGHT: 154 LBS | TEMPERATURE: 98.2 F | SYSTOLIC BLOOD PRESSURE: 122 MMHG | HEART RATE: 82 BPM | DIASTOLIC BLOOD PRESSURE: 60 MMHG

## 2018-03-02 DIAGNOSIS — F17.200 SMOKING: ICD-10-CM

## 2018-03-02 DIAGNOSIS — J40 BRONCHITIS: Primary | ICD-10-CM

## 2018-03-02 DIAGNOSIS — E78.00 PURE HYPERCHOLESTEROLEMIA: ICD-10-CM

## 2018-03-02 DIAGNOSIS — R05.9 COUGH: ICD-10-CM

## 2018-03-02 RX ORDER — ASPIRIN 81 MG/1
81 TABLET ORAL DAILY
Qty: 100 TAB | Refills: 3
Start: 2018-03-02 | End: 2019-05-14 | Stop reason: SDUPTHER

## 2018-03-02 RX ORDER — AZITHROMYCIN 250 MG/1
TABLET, FILM COATED ORAL
Qty: 6 TAB | Refills: 0 | Status: SHIPPED | OUTPATIENT
Start: 2018-03-02 | End: 2018-10-01 | Stop reason: ALTCHOICE

## 2018-03-02 RX ORDER — ALBUTEROL SULFATE 90 UG/1
2 AEROSOL, METERED RESPIRATORY (INHALATION)
Qty: 3 INHALER | Refills: 3 | Status: SHIPPED | OUTPATIENT
Start: 2018-03-02 | End: 2018-07-02 | Stop reason: SDUPTHER

## 2018-03-02 NOTE — PROGRESS NOTES
Christophe Sawant is a 79 y.o. male presenting for/with:    Fever (started last week); Cold Symptoms; Cough; and Chills    HPI:  Symptoms include cough, subjective fever, weakness, mucus, starts right around sunset. Resting, which helps, and taking dayquil, mucinex, which relieves sx for a while. stable since that time. Evaluation to date: none. Hypertension. Blood pressures have been better. Management at last visit included boost to lisinopril 10mg every day. Current regimen: ACEI and calcium channel blocker. Symptoms include no symptoms. Patient denies chest pain, palpitations, peripheral edema. Lab review:   Lab Results   Component Value Date/Time    Sodium 142 10/16/2017 09:48 AM    Potassium 4.1 10/16/2017 09:48 AM    Chloride 101 10/16/2017 09:48 AM    CO2 25 10/16/2017 09:48 AM    Glucose 75 10/16/2017 09:48 AM    BUN 16 10/16/2017 09:48 AM    Creatinine 0.78 10/16/2017 09:48 AM    BUN/Creatinine ratio 21 10/16/2017 09:48 AM    GFR est  10/16/2017 09:48 AM    GFR est non-AA 93 10/16/2017 09:48 AM    Calcium 9.7 10/16/2017 09:48 AM     Hyperlipidemia. On lipitor 20. Francis well. No myalgias, arthralgias, unusual weakness. Lab Results   Component Value Date/Time    Cholesterol, total 136 10/16/2017 09:48 AM    HDL Cholesterol 47 10/16/2017 09:48 AM    LDL, calculated 74 10/16/2017 09:48 AM    VLDL, calculated 15 10/16/2017 09:48 AM    Triglyceride 77 10/16/2017 09:48 AM     Lab Results   Component Value Date/Time    ALT (SGPT) 16 12/01/2016 10:56 AM    AST (SGOT) 17 12/01/2016 10:56 AM    Alk. phosphatase 68 12/01/2016 10:56 AM    Bilirubin, total 0.2 12/01/2016 10:56 AM     DJD  Doing well on Tramadol. using tramadol very sparingly, just half through refill from fall, filled 1/2018. PMH, SH, Medications/Allergies: reviewed, on chart.     ROS:  Constitutional: No fever, chills or weight loss  Respiratory: No cough, SOB   CV: No chest pain or Palpitations    Visit Vitals    /60 (BP 1 Location: Right arm, BP Patient Position: Sitting)    Pulse 82    Temp 98.2 °F (36.8 °C) (Oral)    Resp 14    Ht 5' 8\" (1.727 m)    Wt 154 lb (69.9 kg)    SpO2 96%    BMI 23.42 kg/m2     Wt Readings from Last 3 Encounters:   03/02/18 154 lb (69.9 kg)   01/31/18 155 lb (70.3 kg)   01/15/18 156 lb (70.8 kg)   -1#  BP Readings from Last 3 Encounters:   03/02/18 122/60   01/31/18 150/48   01/15/18 134/64     Physical Examination: General appearance - alert, well appearing, and in no distress  Mental status - alert, oriented to person, place, and time  Eyes - pupils equal and reactive, extraocular eye movements intact  ENT - bilateral external ears and nose normal. Normal lips. OP shows resolved gum whitening. Neck - supple, no significant adenopathy, no thyromegaly or mass  Lymphatics - no palpable lymphadenopathy, no hepatosplenomegaly  Chest - clear to auscultation, no wheezes, rales or rhonchi, symmetric air entry  Heart - normal rate, regular rhythm, normal S1, S2, no murmurs, rubs, clicks or gallops  Extremities - peripheral pulses normal, no pedal edema, no clubbing or cyanosis    A/p:  Cough and congestion sx  Con't to push fluids. Use albuterol QID for 1 week, then PRN. If signs of COPD, start zpack. HTN  In goal. con't norvasc 5mg every day and lisinopril 20mg daily. HLD and ASCVD  High risk, 32.7% 10 yr ASCVD risk. Doing well on lipitor 20 and ASA. Chart review shows aortic atherosclerosis on an old spine series. con't to work on BP control, lipids. DJD knee, L-spine  Has plenty of tramadol 50's for now, taking abou 1 pill + APAP 1 a day. Smoking and cough  Again Discussed cessation. Pt cutting back. Will consider quitting sometime soon. Start wellbutrin 150mg qam on week before quit date, then take 150mg BID on quit date and con't until not craving/2-3mo. R nephrolithiasis  Resolved sx. Stay hydrated.     F/U 4mo

## 2018-03-02 NOTE — MR AVS SNAPSHOT
[address] [address] [phone] Patient: Payam Diop MRN: [id number]:[id number] Visit Information Date & Time Provider Department Dept. Phone Encounter #  
 3/2/2018  3:20 PM Derek Liriano MD [facility] Siva Bentley [id number] Follow-up Instructions Return in about 4 months (around 7/2/2018). Follow-up and Disposition History Your Appointments 3/2/2018  3:20 PM  
ESTABLISHED PATIENT with MD Joe Rodríguez 38 ([facility]) Appt Note: FLU LIKE SYMPTOMS  
 [address] [place] [address],[address] [address] [phone]  
  
   
 [address] [place] [address],[address] [address] 4/25/2018  9:10 AM  
ESTABLISHED PATIENT with MD Joe Rodríguez 38 ([facility]) Appt Note: 3 MO F/U  
 [address] [place] [address],[address] [address] [phone] Upcoming Health Maintenance Date Due  
 GLAUCOMA SCREENING Q2Y 11/12/2018 MEDICARE YEARLY EXAM 1/16/2019 FOBT Q 1 YEAR AGE 50-75 1/25/2019 DTaP/Tdap/Td series (2 - Td) 10/16/2027 Allergies as of 3/2/2018  Review Complete On: 3/2/2018 By: Derek Liriano MD  
  
 Severity Noted Reaction Type Reactions Penicillins  12/01/2016    Rash Current Immunizations  Never Reviewed Name Date Influenza Vaccine 11/1/2016 Influenza Vaccine (Quad) PF 10/16/2017 Pneumococcal Conjugate (PCV-13) 1/13/2017 12:32 PM  
 Pneumococcal Polysaccharide (PPSV-23) 1/31/2018 11:58 AM  
  
 Not reviewed this visit You Were Diagnosed With   
  
 Codes Comments Bronchitis    -  Primary ICD-10-CM: F27 ICD-9-CM: 482 Smoking     ICD-10-CM: F17.200 ICD-9-CM: 305.1 Cough     ICD-10-CM: R05 ICD-9-CM: 786.2 Pure hypercholesterolemia     ICD-10-CM: E78.00 ICD-9-CM: 272.0 Vitals BP Pulse Temp Resp Height(growth percentile) Weight(growth percentile) 122/60 (BP 1 Location: Right arm, BP Patient Position: Sitting) 82 98.2 °F (36.8 °C) (Oral) 14 5' 8\" (1.727 m) 154 lb (69.9 kg) SpO2 BMI Smoking Status 96% 23.42 kg/m2 Current Every Day Smoker BMI and BSA Data Body Mass Index Body Surface Area  
 23.42 kg/m 2 1.83 m 2 Preferred Pharmacy Pharmacy Name Phone [address], [address] Saxena [phone] Your Updated Medication List  
  
   
This list is accurate as of 3/2/18  2:29 PM.  Always use your most recent med list.  
  
  
  
  
 albuterol 90 mcg/actuation inhaler Commonly known as:  PROVENTIL HFA, VENTOLIN HFA, PROAIR HFA Take 2 Puffs by inhalation every four (4) hours as needed for Wheezing. amLODIPine 5 mg tablet Commonly known as:  [place] Rickers TAKE 1 TABLET EVERY DAY  
  
 aspirin delayed-release 81 mg tablet Take 1 Tab by mouth daily. Indications: prevent stroke, heart attack  
  
 atorvastatin 20 mg tablet Commonly known as:  LIPITOR  
TAKE 1 TABLET DAILY FOR CHOLESTEROL AND HEART  
  
 azithromycin 250 mg tablet Commonly known as:  Soriano [place] Take two tablets today then one tablet daily for worsening infection buPROPion  mg SR tablet Commonly known as:  WELLBUTRIN SR  
1 in AM for 1 week prior to quitting smoking, then 1 AM and 1 in Afternoon to stay quit.  
  
 lisinopril 20 mg tablet Commonly known as:  PRINIVIL, ZESTRIL  
TAKE 1 TABLET EVERY DAY  Indications: pressure, new higher dose. traMADol 50 mg tablet Commonly known as:  ULTRAM  
Take 1 Tab by mouth every six (6) hours as needed for Pain. Max Daily Amount: 200 mg. Prescriptions Sent to Pharmacy Refills  
 albuterol (PROVENTIL HFA, VENTOLIN HFA, PROAIR HFA) 90 mcg/actuation inhaler 3 Sig: Take 2 Puffs by inhalation every four (4) hours as needed for Wheezing. Class: Normal  
 Pharmacy: [address] [place] [address], [address] Ph #: [phone] Route: Inhalation  
 azithromycin (ZITHROMAX) 250 mg tablet 0 Sig: Take two tablets today then one tablet daily for worsening infection Class: Normal  
 Pharmacy: [facility] [address] Saxena Ph #: [phone] Follow-up Instructions Return in about 4 months (around 7/2/2018). Patient Instructions If you have any questions regarding The [employer] website, you may call The [employer] website support at [phone]. Introducing [facility] & [facility] SERVICES! Dear Libby Agent: Thank you for requesting a [employer] account. Our records indicate that you already have an active [employer] account. You can access your account anytime at [email or web address] [employer] website. [employer]/The [employer] website Did you know that you can access your hospital and ER discharge instructions at any time in Twingly? You can also review all of your test results from your hospital stay or ER visit. Additional Information If you have questions, please visit the Frequently Asked Questions section of the [employer] website at [email or web address] [employer] [email or web address]. [email or web address] Remember, [employer] is NOT to be used for urgent needs. For medical emergencies, dial 911. Now available from your iPhone and Android! Please provide this summary of care documentation to your next provider. Your primary care clinician is listed as Antonia Marte. If you have any questions after today's visit, please call [phone].

## 2018-03-02 NOTE — PATIENT INSTRUCTIONS
If you have any questions regarding [employer], you may call [employer] support at [phone].

## 2018-05-10 DIAGNOSIS — M47.816 SPONDYLOSIS OF LUMBAR REGION WITHOUT MYELOPATHY OR RADICULOPATHY: ICD-10-CM

## 2018-05-10 RX ORDER — TRAMADOL HYDROCHLORIDE 50 MG/1
50 TABLET ORAL
Qty: 45 TAB | Refills: 1 | Status: SHIPPED | OUTPATIENT
Start: 2018-05-10 | End: 2018-07-02 | Stop reason: SDUPTHER

## 2018-05-10 NOTE — TELEPHONE ENCOUNTER
----- Message from [employer] sent at 5/10/2018  9:52 AM EDT -----  Regarding: Rosanna/telephone  Pt is requesting a Rx for Tramadol called to [facility] and he has no medication left. Pts number is [phone].       Next scheduled apt:  Monday, July 2, 2018 09:10am w/Rosanna

## 2018-07-02 ENCOUNTER — OFFICE VISIT (OUTPATIENT)
Dept: FAMILY MEDICINE CLINIC | Age: 68
End: 2018-07-02

## 2018-07-02 VITALS
DIASTOLIC BLOOD PRESSURE: 58 MMHG | SYSTOLIC BLOOD PRESSURE: 138 MMHG | WEIGHT: 156 LBS | RESPIRATION RATE: 14 BRPM | HEART RATE: 76 BPM | BODY MASS INDEX: 23.64 KG/M2 | TEMPERATURE: 98 F | OXYGEN SATURATION: 98 % | HEIGHT: 68 IN

## 2018-07-02 DIAGNOSIS — M47.816 SPONDYLOSIS OF LUMBAR REGION WITHOUT MYELOPATHY OR RADICULOPATHY: ICD-10-CM

## 2018-07-02 DIAGNOSIS — I10 ESSENTIAL HYPERTENSION: ICD-10-CM

## 2018-07-02 DIAGNOSIS — F17.200 SMOKING: ICD-10-CM

## 2018-07-02 DIAGNOSIS — R05.9 COUGH: ICD-10-CM

## 2018-07-02 DIAGNOSIS — I25.10 ASCVD (ARTERIOSCLEROTIC CARDIOVASCULAR DISEASE): ICD-10-CM

## 2018-07-02 DIAGNOSIS — J40 BRONCHITIS: ICD-10-CM

## 2018-07-02 DIAGNOSIS — M51.36 DEGENERATIVE LUMBAR DISC: Primary | ICD-10-CM

## 2018-07-02 RX ORDER — TRAMADOL HYDROCHLORIDE 50 MG/1
50 TABLET ORAL
Qty: 90 TAB | Refills: 2 | Status: SHIPPED | OUTPATIENT
Start: 2018-07-07 | End: 2019-02-08 | Stop reason: SDUPTHER

## 2018-07-02 RX ORDER — BUPROPION HYDROCHLORIDE 150 MG/1
TABLET, EXTENDED RELEASE ORAL
Qty: 60 TAB | Refills: 2 | Status: SHIPPED | OUTPATIENT
Start: 2018-07-02 | End: 2019-05-14

## 2018-07-02 RX ORDER — ALBUTEROL SULFATE 90 UG/1
2 AEROSOL, METERED RESPIRATORY (INHALATION)
Qty: 3 INHALER | Refills: 3 | Status: SHIPPED | OUTPATIENT
Start: 2018-07-02 | End: 2019-08-22 | Stop reason: ALTCHOICE

## 2018-07-02 NOTE — PROGRESS NOTES
1. Have you been to the ER, urgent care clinic since your last visit? Hospitalized since your last visit? No    2. Have you seen or consulted any other health care providers outside of the [address] [place] [address] since your last visit? Include any pap smears or colon screening.  No

## 2018-07-02 NOTE — MR AVS SNAPSHOT
[address] [place] [address],[address] [address] [phone] Patient: Alyssa Toscano MRN: [id number] Visit Information Date & Time Provider Department Dept. Phone Encounter #  
 7/2/2018  9:10 AM Aida Zimmer MD [address] [place] [address] [id number] Follow-up Instructions Return in about 3 months (around 10/2/2018). Follow-up and Disposition History Upcoming Health Maintenance Date Due Influenza Age 5 to Adult 8/1/2018 GLAUCOMA SCREENING Q2Y 11/12/2018 MEDICARE YEARLY EXAM 1/16/2019 FOBT Q 1 YEAR AGE 50-75 1/25/2019 DTaP/Tdap/Td series (2 - Td) 10/16/2027 Allergies as of 7/2/2018  Review Complete On: 7/2/2018 By: Aida Zimmer MD  
  
 Severity Noted Reaction Type Reactions Penicillins  12/01/2016    Rash Current Immunizations  Never Reviewed Name Date Influenza Vaccine 11/1/2016 Influenza Vaccine (Quad) PF 10/16/2017 Pneumococcal Conjugate (PCV-13) 1/13/2017 12:32 PM  
 Pneumococcal Polysaccharide (PPSV-23) 1/31/2018 11:58 AM  
  
 Not reviewed this visit You Were Diagnosed With   
  
 Codes Comments Degenerative lumbar disc    -  Primary ICD-10-CM: M51.36 
ICD-9-CM: 722.52 Smoking     ICD-10-CM: F17.200 ICD-9-CM: 305.1 Cough     ICD-10-CM: R05 ICD-9-CM: 786.2 Bronchitis     ICD-10-CM: J40 ICD-9-CM: 652 Spondylosis of lumbar region without myelopathy or radiculopathy     ICD-10-CM: M47.816 ICD-9-CM: 721.3 ASCVD (arteriosclerotic cardiovascular disease)     ICD-10-CM: I25.10 ICD-9-CM: 429.2, 440.9 Essential hypertension     ICD-10-CM: I10 
ICD-9-CM: 401.9 Vitals BP Pulse Temp Resp Height(growth percentile) Weight(growth percentile) 138/58 (BP 1 Location: Right arm, BP Patient Position: Sitting) 76 98 °F (36.7 °C) (Oral) 14 5' 8\" (1.727 m) 156 lb (70.8 kg) SpO2 BMI Smoking Status 98% 23.72 kg/m2 Current Every Day Smoker BMI and BSA Data Body Mass Index Body Surface Area  
 23.72 kg/m 2 1.84 m 2 Preferred Pharmacy Pharmacy Name Phone Brianna Martinez [address] [place] [address] 1157 [facility] [address] [place] [address] [phone] Your Updated Medication List  
  
   
This list is accurate as of 18 10:07 AM.  Always use your most recent med list.  
  
  
  
  
 albuterol 90 mcg/actuation inhaler Commonly known as:  PROVENTIL HFA, VENTOLIN HFA, PROAIR HFA Take 2 Puffs by inhalation every four (4) hours as needed for Wheezing. amLODIPine 5 mg tablet Commonly known as:  [place] Bk TAKE 1 TABLET EVERY DAY  
  
 aspirin delayed-release 81 mg tablet Take 1 Tab by mouth daily. Indications: prevent stroke, heart attack  
  
 atorvastatin 20 mg tablet Commonly known as:  LIPITOR  
TAKE 1 TABLET DAILY FOR CHOLESTEROL AND HEART  
  
 azithromycin 250 mg tablet Commonly known as:  Sherren Mohair Take two tablets today then one tablet daily for worsening infection buPROPion  mg SR tablet Commonly known as:  WELLBUTRIN SR  
1 in AM for 1 week prior to quitting smoking, then 1 AM and 1 in Afternoon to stay quit.  
  
 lisinopril 20 mg tablet Commonly known as:  PRINIVIL, ZESTRIL  
TAKE 1 TABLET EVERY DAY  Indications: pressure, new higher dose. traMADol 50 mg tablet Commonly known as:  ULTRAM  
Take 1 Tab by mouth three (3) times daily as needed for Pain. Max Daily Amount: 150 mg. Start taking on:  2018 Prescriptions Printed Refills  
 traMADol (ULTRAM) 50 mg tablet 2 Starting on: 2018 Sig: Take 1 Tab by mouth three (3) times daily as needed for Pain. Max Daily Amount: 150 mg.  
 Class: Print Route: Oral  
 buPROPion SR (WELLBUTRIN SR) 150 mg SR tablet 2 Si in AM for 1 week prior to quitting smoking, then 1 AM and 1 in Afternoon to stay quit. Class: Print Prescriptions Sent to Pharmacy Refills  
 albuterol (PROVENTIL HFA, VENTOLIN HFA, PROAIR HFA) 90 mcg/actuation inhaler 3 Sig: Take 2 Puffs by inhalation every four (4) hours as needed for Wheezing. Class: Normal  
 Pharmacy: [address] [place] [address], [address] [place] [address] #: [phone] Route: Inhalation We Performed the Following REFERRAL TO PHYSICAL THERAPY [JSV82 Custom] Comments:  
 [place] preferred Follow-up Instructions Return in about 3 months (around 10/2/2018). Referral Information Referral ID Referred By Referred To  
  
 [id number] DASHA Mcrae [address] [facility], [address] Phone: [phone] Fax: [phone] Visits Status Start Date End Date 1 New Request 7/2/18 7/2/19 If your referral has a status of pending review or denied, additional information will be sent to support the outcome of this decision. Introducing [facility] & HEALTH SERVICES! Dear Adrien Zuniga: Thank you for requesting a [employer] account. Our records indicate that you already have an active [employer] account. You can access your account anytime at [email or web address] Did you know that you can access your hospital and ER discharge instructions at any time in [employer]? You can also review all of your test results from your hospital stay or ER visit. Additional Information If you have questions, please visit the Frequently Asked Questions section of the [employer] website at [email or web address] Remember, [employer] is NOT to be used for urgent needs. For medical emergencies, dial 911. Now available from your iPhone and Android! Please provide this summary of care documentation to your next provider. Your primary care clinician is listed as Laurian Goodell K. Bavuso.  If you have any questions after today's visit, please call [phone].

## 2018-07-02 NOTE — PROGRESS NOTES
Mike King is a 76 y.o. male presenting for/with:    Back Pain (3 mo  check)    HPI:  Hypertension. Blood pressures have been better. Management at last visit included boost to lisinopril 10mg every day. Current regimen: ACEI and calcium channel blocker. Symptoms include no symptoms. Patient denies chest pain, palpitations, peripheral edema. Lab review:   Lab Results   Component Value Date/Time    Sodium 142 10/16/2017 09:48 AM    Potassium 4.1 10/16/2017 09:48 AM    Chloride 101 10/16/2017 09:48 AM    CO2 25 10/16/2017 09:48 AM    Glucose 75 10/16/2017 09:48 AM    BUN 16 10/16/2017 09:48 AM    Creatinine 0.78 10/16/2017 09:48 AM    BUN/Creatinine ratio 21 10/16/2017 09:48 AM    GFR est  10/16/2017 09:48 AM    GFR est non-AA 93 10/16/2017 09:48 AM    Calcium 9.7 10/16/2017 09:48 AM     Hyperlipidemia. On lipitor 20. Francis well. No myalgias, arthralgias, unusual weakness. Lab Results   Component Value Date/Time    Cholesterol, total 136 10/16/2017 09:48 AM    HDL Cholesterol 47 10/16/2017 09:48 AM    LDL, calculated 74 10/16/2017 09:48 AM    VLDL, calculated 15 10/16/2017 09:48 AM    Triglyceride 77 10/16/2017 09:48 AM     Lab Results   Component Value Date/Time    ALT (SGPT) 16 12/01/2016 10:56 AM    AST (SGOT) 17 12/01/2016 10:56 AM    Alk. phosphatase 68 12/01/2016 10:56 AM    Bilirubin, total 0.2 12/01/2016 10:56 AM     DJD  Doing ok on Tramadol, but having to use more regularly, now up to TID. Running low on fill from 5/2018 ore of #45. Smoking  We ore wellbutrin 150mg BID last visit. Never got that though. Was told it was $100/fill. PMH, SH, Medications/Allergies: reviewed, on chart.     ROS:  Constitutional: No fever, chills or weight loss  Respiratory: No cough, SOB   CV: No chest pain or Palpitations    Visit Vitals    /58 (BP 1 Location: Right arm, BP Patient Position: Sitting)    Pulse 76    Temp 98 °F (36.7 °C) (Oral)    Resp 14    Ht 5' 8\" (1.727 m)    Wt 156 lb (70.8 kg)    SpO2 98%    BMI 23.72 kg/m2     Wt Readings from Last 3 Encounters:   07/02/18 156 lb (70.8 kg)   03/02/18 154 lb (69.9 kg)   01/31/18 155 lb (70.3 kg)   +2#  BP Readings from Last 3 Encounters:   07/02/18 138/58   03/02/18 122/60   01/31/18 150/48     Physical Examination: General appearance - alert, well appearing, and in no distress  Mental status - alert, oriented to person, place, and time  Eyes - pupils equal and reactive, extraocular eye movements intact  ENT - bilateral external ears and nose normal. Normal lips. OP shows resolved gum whitening. Neck - supple, no significant adenopathy, no thyromegaly or mass  Lymphatics - no palpable lymphadenopathy, no hepatosplenomegaly  Chest - clear to auscultation, no wheezes, rales or rhonchi, symmetric air entry  Heart - normal rate, regular rhythm, normal S1, S2, no murmurs, rubs, clicks or gallops  Extremities - peripheral pulses normal, no pedal edema, no clubbing or cyanosis    A/p:  HTN  In goal. con't norvasc 5mg every day and lisinopril 20mg daily. HLD and ASCVD  High risk, 32.7% 10 yr ASCVD risk. Doing well on lipitor 20 and ASA. Chart review shows aortic atherosclerosis on an old spine series. con't to work on BP control, lipids. DJD knee, L-spine  Using more tramadol 50's. Work on [employer], con't APAP as a booster. Smoking and cough  Again discussed cessation. Pt cutting back. Still working on quitting. Try again to start wellbutrin 150mg qam on week before quit date, then take 150mg BID on quit date and con't until not craving/2-3mo. R nephrolithiasis  Resolved sx. Stay hydrated.     F/U 3mo

## 2018-07-25 DIAGNOSIS — E78.00 PURE HYPERCHOLESTEROLEMIA: ICD-10-CM

## 2018-07-25 DIAGNOSIS — I10 ESSENTIAL HYPERTENSION: ICD-10-CM

## 2018-07-26 RX ORDER — AMLODIPINE BESYLATE 5 MG/1
TABLET ORAL
Qty: 90 TAB | Refills: 3 | Status: SHIPPED | OUTPATIENT
Start: 2018-07-26 | End: 2019-12-03 | Stop reason: SDUPTHER

## 2018-07-26 RX ORDER — ATORVASTATIN CALCIUM 20 MG/1
TABLET, FILM COATED ORAL
Qty: 90 TAB | Refills: 3 | Status: SHIPPED | OUTPATIENT
Start: 2018-07-26 | End: 2019-06-17 | Stop reason: SDUPTHER

## 2019-08-22 PROBLEM — M48.061 SPINAL STENOSIS OF LUMBAR REGION WITHOUT NEUROGENIC CLAUDICATION: Status: ACTIVE | Noted: 2019-06-18

## 2019-08-22 PROBLEM — M51.36 DDD (DEGENERATIVE DISC DISEASE), LUMBAR: Status: ACTIVE | Noted: 2019-06-18

## 2019-08-22 PROBLEM — M47.816 LUMBAR SPONDYLOSIS: Status: ACTIVE | Noted: 2019-06-18

## 2019-08-22 PROBLEM — M54.50 LOW BACK PAIN: Status: ACTIVE | Noted: 2019-06-18

## 2021-05-25 ENCOUNTER — OFFICE VISIT (OUTPATIENT)
Dept: FAMILY MEDICINE CLINIC | Age: 71
End: 2021-05-25
Payer: MEDICARE

## 2021-05-25 VITALS
SYSTOLIC BLOOD PRESSURE: 110 MMHG | OXYGEN SATURATION: 9 % | DIASTOLIC BLOOD PRESSURE: 60 MMHG | TEMPERATURE: 98 F | HEART RATE: 66 BPM | HEIGHT: 67 IN | BODY MASS INDEX: 25.46 KG/M2 | WEIGHT: 162.2 LBS

## 2021-05-25 DIAGNOSIS — M47.816 LUMBAR SPONDYLOSIS: ICD-10-CM

## 2021-05-25 DIAGNOSIS — H25.9 AGE-RELATED CATARACT OF BOTH EYES, UNSPECIFIED AGE-RELATED CATARACT TYPE: ICD-10-CM

## 2021-05-25 DIAGNOSIS — G89.29 CHRONIC MIDLINE LOW BACK PAIN WITHOUT SCIATICA: ICD-10-CM

## 2021-05-25 DIAGNOSIS — Z13.39 SCREENING FOR ALCOHOLISM: ICD-10-CM

## 2021-05-25 DIAGNOSIS — Z13.31 SCREENING FOR DEPRESSION: ICD-10-CM

## 2021-05-25 DIAGNOSIS — L30.9 ECZEMA, UNSPECIFIED TYPE: ICD-10-CM

## 2021-05-25 DIAGNOSIS — Z00.00 MEDICARE ANNUAL WELLNESS VISIT, SUBSEQUENT: Primary | ICD-10-CM

## 2021-05-25 DIAGNOSIS — M54.50 CHRONIC MIDLINE LOW BACK PAIN WITHOUT SCIATICA: ICD-10-CM

## 2021-05-25 PROCEDURE — 3017F COLORECTAL CA SCREEN DOC REV: CPT | Performed by: FAMILY MEDICINE

## 2021-05-25 PROCEDURE — G8536 NO DOC ELDER MAL SCRN: HCPCS | Performed by: FAMILY MEDICINE

## 2021-05-25 PROCEDURE — G8510 SCR DEP NEG, NO PLAN REQD: HCPCS | Performed by: FAMILY MEDICINE

## 2021-05-25 PROCEDURE — G8752 SYS BP LESS 140: HCPCS | Performed by: FAMILY MEDICINE

## 2021-05-25 PROCEDURE — G0439 PPPS, SUBSEQ VISIT: HCPCS | Performed by: FAMILY MEDICINE

## 2021-05-25 PROCEDURE — 1101F PT FALLS ASSESS-DOCD LE1/YR: CPT | Performed by: FAMILY MEDICINE

## 2021-05-25 PROCEDURE — 99214 OFFICE O/P EST MOD 30 MIN: CPT | Performed by: FAMILY MEDICINE

## 2021-05-25 PROCEDURE — G8419 CALC BMI OUT NRM PARAM NOF/U: HCPCS | Performed by: FAMILY MEDICINE

## 2021-05-25 PROCEDURE — G8754 DIAS BP LESS 90: HCPCS | Performed by: FAMILY MEDICINE

## 2021-05-25 PROCEDURE — G8427 DOCREV CUR MEDS BY ELIG CLIN: HCPCS | Performed by: FAMILY MEDICINE

## 2021-05-25 PROCEDURE — G0444 DEPRESSION SCREEN ANNUAL: HCPCS | Performed by: FAMILY MEDICINE

## 2021-05-25 RX ORDER — TRIAMCINOLONE ACETONIDE 1 MG/G
CREAM TOPICAL 2 TIMES DAILY
Qty: 60 G | Refills: 11 | Status: SHIPPED | OUTPATIENT
Start: 2021-05-25 | End: 2022-06-21

## 2021-05-25 RX ORDER — TRAMADOL HYDROCHLORIDE 50 MG/1
50 TABLET ORAL
COMMUNITY
End: 2021-05-25

## 2021-05-25 RX ORDER — TRAMADOL HYDROCHLORIDE 50 MG/1
50 TABLET ORAL
Qty: 90 TABLET | Refills: 0 | Status: SHIPPED | OUTPATIENT
Start: 2021-05-25 | End: 2021-08-23 | Stop reason: SDUPTHER

## 2021-05-25 NOTE — PROGRESS NOTES
Subjective: Annual Wellness Visit    Pain Scale: 0/10  Pain Location:     1. Have you been to the ER, urgent care clinic since your last visit? Hospitalized since your last visit? No  2. Have you seen or consulted any other health care providers outside of the [address] [place] [address] since your last visit? Include any pap smears or colon screening. No    HPI:    DJD L-spine  Did well at first with inj from Dr Ori Hallman, gradually had recurrent LBP. Got second spine injection 7/2020, pain improved for a few days, but then got worse again. Able to perform all of his ADL's without difficulty now. Needs RF tramadol, taking about 1/d now. We prev tried cymbalta as an adjunct for pain relief, but pt had a lot of nausea with that and is not taking that at this time. Did good course of phys therapy in past, no help, not interested in more therapy at this time. Hypertension. Blood pressures good today. PT reports is taking meds regularly. Management at last visit included changing lisinopril to lisinopril HCT. Current regimen: ACEI, calcium channel blocker, thiazide. Symptoms include no symptoms. Patient denies chest pain, palpitations, peripheral edema. Lab review:   Lab Results   Component Value Date/Time    Sodium 145 (H) 01/25/2021 08:35 AM    Potassium 4.2 01/25/2021 08:35 AM    Chloride 107 (H) 01/25/2021 08:35 AM    CO2 30 (H) 01/25/2021 08:35 AM    Glucose 76 01/25/2021 08:35 AM    BUN 16 01/25/2021 08:35 AM    Creatinine 0.78 01/25/2021 08:35 AM    BUN/Creatinine ratio 21 01/25/2021 08:35 AM    GFR est  01/25/2021 08:35 AM    GFR est non-AA 91 01/25/2021 08:35 AM    Calcium 9.8 01/25/2021 08:35 AM     Hyperlipidemia. On lipitor 20. Francis well. No myalgias, arthralgias, unusual weakness.   Lab Results   Component Value Date/Time    Cholesterol, total 154 01/25/2021 08:35 AM    HDL Cholesterol 49 01/25/2021 08:35 AM    LDL, calculated 92 01/25/2021 08:35 AM    LDL, calculated 74 02/05/2020 12:49 PM VLDL, calculated 13 01/25/2021 08:35 AM    VLDL, calculated 22 02/05/2020 12:49 PM    Triglyceride 65 01/25/2021 08:35 AM     Lab Results   Component Value Date/Time    ALT (SGPT) 42 01/25/2021 08:35 AM    Alk. phosphatase 82 01/25/2021 08:35 AM    Bilirubin, total <0.2 01/25/2021 08:35 AM     Smoking  Didn't do well with chantix, trying to had nausea with that, felt like the flu, so went back to smoking and stopped chantix. Plans to quit later this year. PMH, SH, Medications/Allergies: reviewed, on chart. Current Outpatient Medications   Medication Sig    traMADoL (ULTRAM) 50 mg tablet Take 1 Tablet by mouth three (3) times daily as needed for Pain for up to 30 days. Max Daily Amount: 150 mg. Indications: low back pain    DULoxetine (CYMBALTA) 30 mg capsule TAKE 1 CAPSULE EVERY DAY FOR NERVE PAIN    dilTIAZem ER (CARDIZEM CD) 180 mg capsule TAKE 1 CAPSULE EVERY DAY FOR PRESSURE AND PULSE (REPLACES NORVASC)    ibuprofen (AdviL) 200 mg tablet Take  by mouth.  multivitamin (ONE A DAY) tablet Take 1 Tab by mouth daily.  lisinopril-hydroCHLOROthiazide (PRINZIDE, ZESTORETIC) 20-12.5 mg per tablet Take 1 Tab by mouth daily.  atorvastatin (LIPITOR) 20 mg tablet TAKE 1 TABLET DAILY FOR CHOLESTEROL AND HEART    aspirin delayed-release 81 mg tablet Take 1 Tab by mouth daily. Indications: prevent stroke, heart attack     No current facility-administered medications for this visit.       No Known Allergies  ROS:  Constitutional: No fever, chills or abnormal weight loss  Respiratory: No cough, SOB   CV: No chest pain or Palpitations    VS review:  Visit Vitals  /60 (BP 1 Location: Left upper arm, BP Patient Position: Sitting, BP Cuff Size: Adult)   Pulse 66   Temp 98 °F (36.7 °C) (Oral)   Ht 5' 7\" (1.702 m)   Wt 162 lb 3.2 oz (73.6 kg)   SpO2 (!) 9%   BMI 25.40 kg/m²     Wt Readings from Last 3 Encounters:   05/25/21 162 lb 3.2 oz (73.6 kg)   01/25/21 166 lb 12.8 oz (75.7 kg)   08/24/20 160 lb (72.6 kg) BP Readings from Last 3 Encounters:   05/25/21 110/60   01/25/21 (!) 182/80   08/24/20 155/60       Objective:     Physical Examination: General appearance - alert, well appearing, and in no distress  Mental status - alert, oriented to person, place, and time  Eyes - pupils equal and reactive, extraocular eye movements intact  ENT - bilateral external ears and nose normal. Normal lips  Neck - supple, no significant adenopathy, no thyromegaly or mass  Chest - bibasilar crackles on auscultation, no wheezes, rales. symmetric air entry. Heart - normal rate, regular rhythm, normal S1, S2, no murmurs, rubs, clicks or gallops  Extremities - peripheral pulses normal, no pedal edema, no clubbing or cyanosis    A/p:  DJD L-spine  Back doing better lately. Con't to work on [employer], APAP as a booster. Con't to work with Antonio Mendez in [facility] for f/u PRN. Due for renewal of tramadol for next mo, ore.  reviewed, in goal. Consider referral to spine DR. Lonzo Cowden if worsening or when ready. HTN  Back in goal. Pulse ok. Con't current regimen. con't lisinopril 20mg daily. Recheck labs. HLD and ASCVD  High risk, 32.7% 10 yr ASCVD risk. Doing well on lipitor and ASA. Labs in goal last check, Plan recheck ~8/2021. Chart review shows aortic atherosclerosis on an old spine series. con't to work on BP control, lipid mgmt, smoking cessation. Smoking  Again discussed cessation. Pt working on that. Encouraged. Hyperglycemia  Lab Results   Component Value Date/Time    Hemoglobin A1c 5.8 (H) 02/12/2019 08:40 AM   Not too bad. Plan monitor occasionally. Con't to work on [employer]. Mild eczema to flanks  Gets during winter season. Tx with kenalog 0.1% cr AAA BID PRN    Cataracts  Seen by Annabel Stiles, rec to have cataract surgery. Ready for referral for eye surgery for now. Will set up with [facility]. Colon ca screening:  [facility] told [facility]. PT will try to get report for us.     F/U 3mo    [facility] Jericho Aguero MD      1. Have you been to the ER, urgent care clinic since your last visit? Hospitalized since your last visit? No    2. Have you seen or consulted any other health care providers outside of the [address] [place] [address] since your last visit? Include any pap smears or colon screening. No   ______________________________________________________________________    Samantha Foley is a 70 y.o. male and presents for annual Medicare Wellness Visit. Problem List: Reviewed with patient and discussed risk factors. Patient Active Problem List   Diagnosis Code    Essential hypertension I10    Nephrolithiasis N20.0    Smoking F17.200    Pure hypercholesterolemia E78.00    Post-traumatic osteoarthritis of left knee M17.32    ASCVD (arteriosclerotic cardiovascular disease) I25.10    Low back pain M54.5    Lumbar spondylosis M47.816    Spinal stenosis of lumbar region without neurogenic claudication M48.061    DDD (degenerative disc disease), lumbar M51.36       Current medical providers:  Patient Care Team:  Guillermo Siddiqui MD as PCP - General (Family Medicine)  Guillermo Siddiqui MD as PCP - [facility]    [facility], , Medications/Allergies: reviewed, on chart. Male Alcohol Screening: On any occasion during the past 3 months, have you had more than 4 drinks containing alcohol? No    Do you average more than 14 drinks per week? No    ROS:  Constitutional: No fever, chills or weight loss  Respiratory: No cough, SOB   CV: No chest pain or Palpitations    Objective:  Visit Vitals  /60 (BP 1 Location: Left upper arm, BP Patient Position: Sitting, BP Cuff Size: Adult)   Pulse 66   Temp 98 °F (36.7 °C) (Oral)   Ht 5' 7\" (1.702 m)   Wt 162 lb 3.2 oz (73.6 kg)   SpO2 (!) 9%   BMI 25.40 kg/m²    Body mass index is 25.4 kg/m².     Assessment of cognitive impairment: Alert and oriented x 3    Depression Screen:   3 most recent PHQ Screens 5/25/2021   PHQ Not Done -   Little interest or pleasure in doing things Not at all   Feeling down, depressed, irritable, or hopeless Not at all   Total Score PHQ 2 0   Trouble falling or staying asleep, or sleeping too much -   Feeling tired or having little energy -   Poor appetite, weight loss, or overeating -   Feeling bad about yourself - or that you are a failure or have let yourself or your family down -   Trouble concentrating on things such as school, work, reading, or watching TV -   Moving or speaking so slowly that other people could have noticed; or the opposite being so fidgety that others notice -   Thoughts of being better off dead, or hurting yourself in some way -   PHQ 9 Score -   How difficult have these problems made it for you to do your work, take care of your home and get along with others -       Fall Risk Assessment:    Fall Risk Assessment, last 12 mths 5/25/2021   Able to walk? Yes   Fall in past 12 months? 1   Do you feel unsteady? 0   Are you worried about falling 0   Is TUG test greater than 12 seconds? 0   Is the gait abnormal? 0   Number of falls in past 12 months 1   Fall with injury? 1       Functional Ability:   Does the patient exhibit a steady gait? yes   How long did it take the patient to get up and walk from a sitting position? 3 sec   Is the patient self reliant?  (ie can do own laundry, meals, household chores)  yes     Does the patient handle his/her own medications? yes     Does the patient handle his/her own money? yes     Is the patients home safe (ie good lighting, handrails on stairs and bath, etc.)? yes     Did you notice or did patient express any hearing difficulties? no     Did you notice or did patient express any vision difficulties? yes       Advance Care Planning:   Patient was offered the opportunity to discuss advance care planning:  yes     Does patient have an Advance Directive:  yes   If no, did you provide information on Caring Connections? yes       Plan:    COVIDvax recommended.  Pt will consider. Orders Placed This Encounter    Depression Screen Annual    REFERRAL TO OPHTHALMOLOGY    DISCONTD: traMADoL (ULTRAM) 50 mg tablet    traMADoL (ULTRAM) 50 mg tablet       Health Maintenance   Topic Date Due    COVID-19 Vaccine (1) Never done    Colorectal Cancer Screening Combo  05/23/2020    Shingrix Vaccine Age 50> (1 of 2) 05/20/2029 (Originally 5/24/2000)    Flu Vaccine (Season Ended) 09/01/2021    Lipid Screen  01/25/2022    Medicare Yearly Exam  05/26/2022    DTaP/Tdap/Td series (2 - Td) 10/16/2027    Hepatitis C Screening  Completed    Pneumococcal 65+ years  Completed       *Patient verbalized understanding and agreement with the plan. A copy of the After Visit Summary with personalized health plan was given to the patient today.

## 2021-08-23 ENCOUNTER — OFFICE VISIT (OUTPATIENT)
Dept: FAMILY MEDICINE CLINIC | Age: 71
End: 2021-08-23
Payer: MEDICARE

## 2021-08-23 VITALS
HEART RATE: 71 BPM | SYSTOLIC BLOOD PRESSURE: 136 MMHG | DIASTOLIC BLOOD PRESSURE: 78 MMHG | BODY MASS INDEX: 25.3 KG/M2 | HEIGHT: 67 IN | WEIGHT: 161.2 LBS | OXYGEN SATURATION: 98 % | TEMPERATURE: 97.1 F | RESPIRATION RATE: 15 BRPM

## 2021-08-23 DIAGNOSIS — G89.29 CHRONIC MIDLINE LOW BACK PAIN WITHOUT SCIATICA: ICD-10-CM

## 2021-08-23 DIAGNOSIS — E78.00 PURE HYPERCHOLESTEROLEMIA: ICD-10-CM

## 2021-08-23 DIAGNOSIS — Z12.11 COLON CANCER SCREENING: ICD-10-CM

## 2021-08-23 DIAGNOSIS — Z51.81 THERAPEUTIC DRUG MONITORING: ICD-10-CM

## 2021-08-23 DIAGNOSIS — M47.816 LUMBAR SPONDYLOSIS: ICD-10-CM

## 2021-08-23 DIAGNOSIS — I10 ESSENTIAL HYPERTENSION: ICD-10-CM

## 2021-08-23 DIAGNOSIS — F17.200 SMOKING: ICD-10-CM

## 2021-08-23 DIAGNOSIS — Z20.828 CONTACT WITH AND (SUSPECTED) EXPOSURE TO OTHER VIRAL COMMUNICABLE DISEASES: ICD-10-CM

## 2021-08-23 DIAGNOSIS — M54.50 CHRONIC MIDLINE LOW BACK PAIN WITHOUT SCIATICA: ICD-10-CM

## 2021-08-23 DIAGNOSIS — M51.36 DDD (DEGENERATIVE DISC DISEASE), LUMBAR: ICD-10-CM

## 2021-08-23 DIAGNOSIS — R50.9 FEVER, UNSPECIFIED FEVER CAUSE: ICD-10-CM

## 2021-08-23 DIAGNOSIS — I25.10 ASCVD (ARTERIOSCLEROTIC CARDIOVASCULAR DISEASE): Primary | ICD-10-CM

## 2021-08-23 LAB — SARS-COV-2 POC: NEGATIVE

## 2021-08-23 PROCEDURE — 3017F COLORECTAL CA SCREEN DOC REV: CPT | Performed by: FAMILY MEDICINE

## 2021-08-23 PROCEDURE — 1101F PT FALLS ASSESS-DOCD LE1/YR: CPT | Performed by: FAMILY MEDICINE

## 2021-08-23 PROCEDURE — G8510 SCR DEP NEG, NO PLAN REQD: HCPCS | Performed by: FAMILY MEDICINE

## 2021-08-23 PROCEDURE — G8427 DOCREV CUR MEDS BY ELIG CLIN: HCPCS | Performed by: FAMILY MEDICINE

## 2021-08-23 PROCEDURE — G8752 SYS BP LESS 140: HCPCS | Performed by: FAMILY MEDICINE

## 2021-08-23 PROCEDURE — G8419 CALC BMI OUT NRM PARAM NOF/U: HCPCS | Performed by: FAMILY MEDICINE

## 2021-08-23 PROCEDURE — 87426 SARSCOV CORONAVIRUS AG IA: CPT | Performed by: FAMILY MEDICINE

## 2021-08-23 PROCEDURE — G8536 NO DOC ELDER MAL SCRN: HCPCS | Performed by: FAMILY MEDICINE

## 2021-08-23 PROCEDURE — 99214 OFFICE O/P EST MOD 30 MIN: CPT | Performed by: FAMILY MEDICINE

## 2021-08-23 PROCEDURE — G8754 DIAS BP LESS 90: HCPCS | Performed by: FAMILY MEDICINE

## 2021-08-23 RX ORDER — TRAMADOL HYDROCHLORIDE 50 MG/1
50 TABLET ORAL
Qty: 180 TABLET | Refills: 0 | Status: SHIPPED | OUTPATIENT
Start: 2021-08-23 | End: 2021-11-23 | Stop reason: SDUPTHER

## 2021-08-23 NOTE — PROGRESS NOTES
Subjective: Follow Up Chronic Condition (3 Month Htn & Cholesterol.)    Pain Scale: 0/10  Pain Location:     1. Have you been to the ER, urgent care clinic since your last visit? Hospitalized since your last visit? No  2. Have you seen or consulted any other health care providers outside of the [address] [place] [address] since your last visit? Include any pap smears or colon screening. No    HPI:  URI Symptoms  Complaint of fever to 101 3d ago, Mild cough, congestion,  Symptoms are gradually improving. Appetite is normal. Activity level is decreased    DJD L-spine  Did well at first with inj from Dr Tiffanie Bazan, gradually had recurrent LBP. Got second spine injection 7/2020, pain improved for a few days, but then got worse again. Able to perform all of his ADL's without difficulty now. Needs RF tramadol, taking about 1/d now. Back on cymbalta as adjunct for pain relief. Did good course of phys therapy in past, no help, not interested in more therapy at this time. UDS good 1/2021.  reviewed, in goal.    Hypertension. Blood pressures good today. PT reports is taking meds regularly. Management at last visit included changing lisinopril to lisinopril HCT. Current regimen: ACEI, calcium channel blocker, thiazide. Symptoms include no symptoms. Patient denies chest pain, palpitations, peripheral edema. Lab review:   Lab Results   Component Value Date/Time    Sodium 145 (H) 01/25/2021 08:35 AM    Potassium 4.2 01/25/2021 08:35 AM    Chloride 107 (H) 01/25/2021 08:35 AM    CO2 30 (H) 01/25/2021 08:35 AM    Glucose 76 01/25/2021 08:35 AM    BUN 16 01/25/2021 08:35 AM    Creatinine 0.78 01/25/2021 08:35 AM    BUN/Creatinine ratio 21 01/25/2021 08:35 AM    GFR est  01/25/2021 08:35 AM    GFR est non-AA 91 01/25/2021 08:35 AM    Calcium 9.8 01/25/2021 08:35 AM     Hyperlipidemia. On lipitor 20. Francis well. No myalgias, arthralgias, unusual weakness.   Lab Results   Component Value Date/Time    Cholesterol, total 154 01/25/2021 08:35 AM    HDL Cholesterol 49 01/25/2021 08:35 AM    LDL, calculated 92 01/25/2021 08:35 AM    LDL, calculated 74 02/05/2020 12:49 PM    VLDL, calculated 13 01/25/2021 08:35 AM    VLDL, calculated 22 02/05/2020 12:49 PM    Triglyceride 65 01/25/2021 08:35 AM     Lab Results   Component Value Date/Time    ALT (SGPT) 42 01/25/2021 08:35 AM    Alk. phosphatase 82 01/25/2021 08:35 AM    Bilirubin, total <0.2 01/25/2021 08:35 AM     Smoking  Didn't do well with chantix in past. Working on getting quit, chewing a lot of nicorette gum. Is contemplative. PMH, SH, Medications/Allergies: reviewed, on chart. Current Outpatient Medications   Medication Sig    triamcinolone acetonide (KENALOG) 0.1 % topical cream Apply  to affected area two (2) times a day. use thin layer    DULoxetine (CYMBALTA) 30 mg capsule TAKE 1 CAPSULE EVERY DAY FOR NERVE PAIN    dilTIAZem ER (CARDIZEM CD) 180 mg capsule TAKE 1 CAPSULE EVERY DAY FOR PRESSURE AND PULSE (REPLACES NORVASC)    ibuprofen (AdviL) 200 mg tablet Take  by mouth.  multivitamin (ONE A DAY) tablet Take 1 Tab by mouth daily.  lisinopril-hydroCHLOROthiazide (PRINZIDE, ZESTORETIC) 20-12.5 mg per tablet Take 1 Tab by mouth daily.  atorvastatin (LIPITOR) 20 mg tablet TAKE 1 TABLET DAILY FOR CHOLESTEROL AND HEART    aspirin delayed-release 81 mg tablet Take 1 Tab by mouth daily. Indications: prevent stroke, heart attack    traMADoL (ULTRAM) 50 mg tablet Take 1 Tablet by mouth three (3) times daily as needed for Pain for up to 30 days. Max Daily Amount: 150 mg. Indications: low back pain     No current facility-administered medications for this visit.       No Known Allergies  ROS:  Constitutional: No fever, chills or abnormal weight loss  Respiratory: No cough, SOB   CV: No chest pain or Palpitations    VS review:  Visit Vitals  /78 (BP 1 Location: Left upper arm, BP Patient Position: At rest, BP Cuff Size: Adult)   Pulse 71   Temp 97.1 °F (36.2 °C) (Core)   Resp 15   Ht 5' 7\" (1.702 m)   Wt 161 lb 3.2 oz (73.1 kg)   SpO2 98%   BMI 25.25 kg/m²     Wt Readings from Last 3 Encounters:   08/23/21 161 lb 3.2 oz (73.1 kg)   05/25/21 162 lb 3.2 oz (73.6 kg)   01/25/21 166 lb 12.8 oz (75.7 kg)     BP Readings from Last 3 Encounters:   08/23/21 136/78   05/25/21 110/60   01/25/21 (!) 182/80       Objective:     Physical Examination: General appearance - alert, well appearing, and in no distress  Mental status - alert, oriented to person, place, and time  Eyes - pupils equal and reactive, extraocular eye movements intact  ENT - bilateral external ears and nose normal. Normal lips  Neck - supple, no significant adenopathy, no thyromegaly or mass  Chest - bibasilar crackles on auscultation, no wheezes, rales. symmetric air entry. Heart - normal rate, regular rhythm, normal S1, S2, no murmurs, rubs, clicks or gallops  Extremities - peripheral pulses normal, no pedal edema, no clubbing or cyanosis    Rapid COVID: NEG    A/p:    DJD L-spine  Back doing worse lately. Con't to work on [employer], APAP as a booster. Ready to move forward on spine surgery eval. Will set up with spine DR. Lety Calderon for eval and workup. Check UDS. HTN  In goal. Pulse ok. Con't current regimen. con't lisinopril HCT 20/12.5mg daily. Recheck labs. HLD and ASCVD  High risk, 32.7% 10 yr ASCVD risk. Doing well on lipitor and ASA. Labs in goal last check, Plan recheck ~8/2021. Chart review shows aortic atherosclerosis on an old spine series. con't to work on BP control, lipid mgmt, smoking cessation. Smoking  Again discussed cessation. Pt working on that. Encouraged. Hyperglycemia  Lab Results   Component Value Date/Time    Hemoglobin A1c 5.8 (H) 02/12/2019 08:40 AM   Not too bad. Plan monitor occasionally. Con't to work on [employer]. Mild eczema to flanks  Gets during winter season. Monitor, tx prn with kenalog 0.1% cr AAA BID PRN    Colon ca screening:  Dit FIT kit from [facility], told was negative. Can't get report. RPT FIT. Cataracts  Saw Rodney Chiu, told cataracts weren't that bad. Monitoring for now. COVIDvax recommended. Pt will consider. F/U 3mo    Milotn Quiñones MD      1. Have you been to the ER, urgent care clinic since your last visit? Hospitalized since your last visit? No    2. Have you seen or consulted any other health care providers outside of the [address] [place] [address] since your last visit? Include any pap smears or colon screening.  No

## 2021-08-23 NOTE — PROGRESS NOTES
Learning Assessment 5/25/2021   PRIMARY LEARNER Patient   PRIMARY LANGUAGE ENGLISH   LEARNER PREFERENCE PRIMARY READING     -   ANSWERED BY patient    RELATIONSHIP SELF       1. Have you been to the ER, urgent care clinic since your last visit? Hospitalized since your last visit? No    2. Have you seen or consulted any other health care providers outside of the [address] [place] [address] since your last visit? Include any pap smears or colon screening.  No        Results for orders placed or performed in visit on 08/23/21   AMB POC SARS-COV-2   Result Value Ref Range    SARS-COV-2 POC Negative Negative

## 2021-08-24 LAB
ALBUMIN SERPL-MCNC: 4.5 G/DL (ref 3.5–5)
ALBUMIN/GLOB SERPL: 1.6 {RATIO} (ref 1.1–2.2)
ALP SERPL-CCNC: 98 U/L (ref 45–117)
ALT SERPL-CCNC: 49 U/L (ref 12–78)
AMPHETAMINES UR QL SCN: NEGATIVE NG/ML
ANION GAP SERPL CALC-SCNC: 3 MMOL/L (ref 5–15)
AST SERPL-CCNC: 21 U/L (ref 15–37)
BARBITURATES UR QL SCN: NEGATIVE NG/ML
BENZODIAZ UR QL: NEGATIVE NG/ML
BILIRUB SERPL-MCNC: 0.2 MG/DL (ref 0.2–1)
BUN SERPL-MCNC: 24 MG/DL (ref 6–20)
BUN/CREAT SERPL: 28 (ref 12–20)
BZE UR QL: NEGATIVE NG/ML
CALCIUM SERPL-MCNC: 9.6 MG/DL (ref 8.5–10.1)
CANNABINOIDS UR QL SCN: NEGATIVE NG/ML
CHLORIDE SERPL-SCNC: 106 MMOL/L (ref 97–108)
CHOLEST SERPL-MCNC: 132 MG/DL
CO2 SERPL-SCNC: 29 MMOL/L (ref 21–32)
CREAT SERPL-MCNC: 0.85 MG/DL (ref 0.7–1.3)
GLOBULIN SER CALC-MCNC: 2.9 G/DL (ref 2–4)
GLUCOSE SERPL-MCNC: 70 MG/DL (ref 65–100)
HDLC SERPL-MCNC: 42 MG/DL
HDLC SERPL: 3.1 {RATIO} (ref 0–5)
LDLC SERPL CALC-MCNC: 65.2 MG/DL (ref 0–100)
OPIATES UR QL: NEGATIVE NG/ML
PCP UR QL: NEGATIVE NG/ML
POTASSIUM SERPL-SCNC: 4.2 MMOL/L (ref 3.5–5.1)
PROT SERPL-MCNC: 7.4 G/DL (ref 6.4–8.2)
SODIUM SERPL-SCNC: 138 MMOL/L (ref 136–145)
TRIGL SERPL-MCNC: 124 MG/DL (ref ?–150)
VLDLC SERPL CALC-MCNC: 24.8 MG/DL

## 2021-10-18 DIAGNOSIS — I10 ESSENTIAL HYPERTENSION: ICD-10-CM

## 2021-10-18 RX ORDER — LISINOPRIL AND HYDROCHLOROTHIAZIDE 12.5; 2 MG/1; MG/1
TABLET ORAL
Qty: 90 TABLET | Refills: 3 | Status: SHIPPED | OUTPATIENT
Start: 2021-10-18 | End: 2022-07-11

## 2021-10-27 DIAGNOSIS — E78.00 PURE HYPERCHOLESTEROLEMIA: ICD-10-CM

## 2021-10-27 RX ORDER — CALCIUM CITRATE/VITAMIN D3 200MG-6.25
TABLET ORAL
OUTPATIENT
Start: 2021-10-27

## 2021-10-27 RX ORDER — ISOPROPYL ALCOHOL 70 ML/100ML
SWAB TOPICAL
OUTPATIENT
Start: 2021-10-27

## 2021-10-27 RX ORDER — BLOOD-GLUCOSE METER
EACH MISCELLANEOUS
OUTPATIENT
Start: 2021-10-27

## 2021-10-29 RX ORDER — ATORVASTATIN CALCIUM 20 MG/1
TABLET, FILM COATED ORAL
Qty: 90 TABLET | Refills: 3 | Status: SHIPPED | OUTPATIENT
Start: 2021-10-29 | End: 2022-07-11

## 2021-11-23 ENCOUNTER — OFFICE VISIT (OUTPATIENT)
Dept: FAMILY MEDICINE CLINIC | Age: 71
End: 2021-11-23
Payer: MEDICARE

## 2021-11-23 VITALS
HEIGHT: 67 IN | RESPIRATION RATE: 18 BRPM | WEIGHT: 161 LBS | OXYGEN SATURATION: 99 % | HEART RATE: 93 BPM | DIASTOLIC BLOOD PRESSURE: 82 MMHG | SYSTOLIC BLOOD PRESSURE: 142 MMHG | BODY MASS INDEX: 25.27 KG/M2 | TEMPERATURE: 98.1 F

## 2021-11-23 DIAGNOSIS — I10 PRIMARY HYPERTENSION: ICD-10-CM

## 2021-11-23 DIAGNOSIS — G89.29 CHRONIC MIDLINE LOW BACK PAIN WITHOUT SCIATICA: ICD-10-CM

## 2021-11-23 DIAGNOSIS — M54.50 CHRONIC MIDLINE LOW BACK PAIN WITHOUT SCIATICA: ICD-10-CM

## 2021-11-23 DIAGNOSIS — M47.816 LUMBAR SPONDYLOSIS: ICD-10-CM

## 2021-11-23 DIAGNOSIS — Z51.81 THERAPEUTIC DRUG MONITORING: ICD-10-CM

## 2021-11-23 DIAGNOSIS — F17.200 SMOKING: Primary | ICD-10-CM

## 2021-11-23 DIAGNOSIS — E78.2 MIXED HYPERLIPIDEMIA: ICD-10-CM

## 2021-11-23 DIAGNOSIS — M51.36 DDD (DEGENERATIVE DISC DISEASE), LUMBAR: ICD-10-CM

## 2021-11-23 PROCEDURE — 1101F PT FALLS ASSESS-DOCD LE1/YR: CPT | Performed by: FAMILY MEDICINE

## 2021-11-23 PROCEDURE — G8510 SCR DEP NEG, NO PLAN REQD: HCPCS | Performed by: FAMILY MEDICINE

## 2021-11-23 PROCEDURE — G8427 DOCREV CUR MEDS BY ELIG CLIN: HCPCS | Performed by: FAMILY MEDICINE

## 2021-11-23 PROCEDURE — 3017F COLORECTAL CA SCREEN DOC REV: CPT | Performed by: FAMILY MEDICINE

## 2021-11-23 PROCEDURE — G8754 DIAS BP LESS 90: HCPCS | Performed by: FAMILY MEDICINE

## 2021-11-23 PROCEDURE — G8536 NO DOC ELDER MAL SCRN: HCPCS | Performed by: FAMILY MEDICINE

## 2021-11-23 PROCEDURE — G8419 CALC BMI OUT NRM PARAM NOF/U: HCPCS | Performed by: FAMILY MEDICINE

## 2021-11-23 PROCEDURE — G8753 SYS BP > OR = 140: HCPCS | Performed by: FAMILY MEDICINE

## 2021-11-23 PROCEDURE — 99214 OFFICE O/P EST MOD 30 MIN: CPT | Performed by: FAMILY MEDICINE

## 2021-11-23 RX ORDER — TRAMADOL HYDROCHLORIDE 50 MG/1
50 TABLET ORAL
Qty: 90 TABLET | Refills: 2 | Status: SHIPPED | OUTPATIENT
Start: 2021-11-23 | End: 2022-02-22 | Stop reason: SDUPTHER

## 2021-11-23 NOTE — PROGRESS NOTES
Subjective:   Hypertension    Pain Scale: 0/10  Pain Location:     1. Have you been to the ER, urgent care clinic since your last visit? Hospitalized since your last visit? No  2. Have you seen or consulted any other health care providers outside of the [address] [place] [address] since your last visit? Include any pap smears or colon screening. No    HPI:  DJD L-spine  Did well at first with inj from Dr John Benjamin, gradually had recurrent LBP. Got second spine injection 7/2020, pain improved for a few days, but then got worse again. Able to perform all of his ADL's without difficulty now. Doing well on tramadol, taking about 3/d now. Back on cymbalta as adjunct for pain relief. Did good course of phys therapy in past, no help, not interested in more therapy at this time. UDS good 8/2021.  reviewed, in goal, last fill 10/22/21, so due today. Hypertension. Blood pressures a little up today. PT reports is taking meds regularly. Management at last visit included con't lisinopril HCT. Current regimen: ACEI, calcium channel blocker, thiazide. Symptoms include no symptoms. Patient denies chest pain, palpitations, peripheral edema. Lab review:   Lab Results   Component Value Date/Time    Sodium 138 08/23/2021 09:28 AM    Potassium 4.2 08/23/2021 09:28 AM    Chloride 106 08/23/2021 09:28 AM    CO2 29 08/23/2021 09:28 AM    Anion gap 3 (L) 08/23/2021 09:28 AM    Glucose 70 08/23/2021 09:28 AM    BUN 24 (H) 08/23/2021 09:28 AM    Creatinine 0.85 08/23/2021 09:28 AM    BUN/Creatinine ratio 28 (H) 08/23/2021 09:28 AM    GFR est AA >60 08/23/2021 09:28 AM    GFR est non-AA >60 08/23/2021 09:28 AM    Calcium 9.6 08/23/2021 09:28 AM     Hyperlipidemia. On lipitor 20. Francis well. No myalgias, arthralgias, unusual weakness.   Lab Results   Component Value Date/Time    Cholesterol, total 132 08/23/2021 09:28 AM    HDL Cholesterol 42 08/23/2021 09:28 AM    LDL, calculated 65.2 08/23/2021 09:28 AM    VLDL, calculated 24.8 08/23/2021 09:28 AM    Triglyceride 124 08/23/2021 09:28 AM    CHOL/HDL Ratio 3.1 08/23/2021 09:28 AM     Lab Results   Component Value Date/Time    ALT (SGPT) 49 08/23/2021 09:28 AM    Alk. phosphatase 98 08/23/2021 09:28 AM    Bilirubin, total 0.2 08/23/2021 09:28 AM     Smoking  Didn't do well with chantix in past. Working on getting quit, chewing a lot of nicorette gum. Is contemplative. PMH, SH, Medications/Allergies: reviewed, on chart. Current Outpatient Medications   Medication Sig    atorvastatin (LIPITOR) 20 mg tablet TAKE 1 TABLET DAILY FOR CHOLESTEROL AND HEART    lisinopril-hydroCHLOROthiazide (PRINZIDE, ZESTORETIC) 20-12.5 mg per tablet TAKE 1 TABLET EVERY DAY (REPLACES PLAIN LISINOPRIL)    triamcinolone acetonide (KENALOG) 0.1 % topical cream Apply  to affected area two (2) times a day. use thin layer    DULoxetine (CYMBALTA) 30 mg capsule TAKE 1 CAPSULE EVERY DAY FOR NERVE PAIN    dilTIAZem ER (CARDIZEM CD) 180 mg capsule TAKE 1 CAPSULE EVERY DAY FOR PRESSURE AND PULSE (REPLACES NORVASC)    ibuprofen (AdviL) 200 mg tablet Take  by mouth.  multivitamin (ONE A DAY) tablet Take 1 Tab by mouth daily.  aspirin delayed-release 81 mg tablet Take 1 Tab by mouth daily. Indications: prevent stroke, heart attack    traMADoL (ULTRAM) 50 mg tablet Take 1 Tablet by mouth three (3) times daily as needed for Pain for up to 90 days. Max Daily Amount: 150 mg. Indications: low back pain     No current facility-administered medications for this visit.       No Known Allergies  ROS:  Constitutional: No fever, chills or abnormal weight loss  Respiratory: No cough, SOB   CV: No chest pain or Palpitations    VS review:  Visit Vitals  BP (!) 142/82 (BP 1 Location: Right arm)   Pulse 93   Temp 98.1 °F (36.7 °C) (Oral)   Resp 18   Ht 5' 7\" (1.702 m)   Wt 161 lb (73 kg)   SpO2 99%   BMI 25.22 kg/m²     Wt Readings from Last 3 Encounters:   11/23/21 161 lb (73 kg)   08/23/21 161 lb 3.2 oz (73.1 kg)   05/25/21 162 lb 3.2 oz (73.6 kg)     BP Readings from Last 3 Encounters:   11/23/21 (!) 142/82   08/23/21 136/78   05/25/21 110/60       Objective:     Physical Examination: General appearance - alert, well appearing, and in no distress  Mental status - alert, oriented to person, place, and time  Eyes - pupils equal and reactive, extraocular eye movements intact  ENT - bilateral external ears and nose normal. Normal lips  Neck - supple, no significant adenopathy, no thyromegaly or mass  Chest - bibasilar crackles on auscultation, no wheezes, rales. symmetric air entry. Heart - normal rate, regular rhythm, normal S1, S2, no murmurs, rubs, clicks or gallops  Extremities - peripheral pulses normal, no pedal edema, no clubbing or cyanosis    A/p:  DJD L-spine  Back doing worse lately. Con't to work on [employer], [employer] as a booster. Ready to move forward on spine surgery eval. Will set up with spine DR. Umberto Beltre for eval and workup. Check UDS. HTN  Above goal, but close. Probably will get to goal once quits smoking. Pulse ok. Con't current regimen. con't lisinopril HCT 20/12.5mg daily. Recheck labs. HLD and ASCVD  High risk, 32.7% 10 yr ASCVD risk. Doing well on lipitor and ASA. Labs in goal last check, Plan recheck ~8/2021. Chart review shows aortic atherosclerosis on an old spine series. con't to work on BP control, lipid mgmt, smoking cessation. Smoking  Again discussed cessation. Pt working on that. Encouraged. Hyperglycemia  Lab Results   Component Value Date/Time    Hemoglobin A1c 5.8 (H) 02/12/2019 08:40 AM   Not too bad. Plan monitor occasionally. Con't to work on [employer]. Mild eczema to flanks  Gets during winter season. Monitor, tx prn with kenalog 0.1% cr AAA BID PRN    Colon ca screening:  Dit FIT kit from [facility], told was negative. get report. RPT FIT PRN. Cataracts  Saw Mikhail Pires, told cataracts weren't that bad. Monitoring for now. COVIDvax recommended. Pt will consider.     F/U [address], [place]      1. Have you been to the ER, urgent care clinic since your last visit? Hospitalized since your last visit? No    2. Have you seen or consulted any other health care providers outside of the [address] [place] [address] since your last visit? Include any pap smears or colon screening.  No

## 2021-11-23 NOTE — PROGRESS NOTES
Chief Complaint   Patient presents with    Hypertension     1. Have you been to the ER, urgent care clinic since your last visit? Hospitalized since your last visit? No    2. Have you seen or consulted any other health care providers outside of the [address] [place] [address] since your last visit? Include any pap smears or colon screening. No    Identified pt with two pt identifiers(name and ). Reviewed record in preparation for visit and have obtained necessary documentation.     Symptom review:    NO  Fever   NO  Shaking chills  NO  Cough  NO Headaches  NO  Body aches  NO  Coughing up blood  NO  Chest congestion  NO  Chest pain  NO  Shortness of breath  NO  Profound Loss of smell/taste  NO  Nausea/Vomiting   NO  Loose stool/Diarrhea  NO  any skin issues

## 2022-02-22 ENCOUNTER — OFFICE VISIT (OUTPATIENT)
Dept: FAMILY MEDICINE CLINIC | Age: 72
End: 2022-02-22
Payer: MEDICARE

## 2022-02-22 VITALS
WEIGHT: 163.2 LBS | DIASTOLIC BLOOD PRESSURE: 80 MMHG | OXYGEN SATURATION: 99 % | RESPIRATION RATE: 16 BRPM | SYSTOLIC BLOOD PRESSURE: 120 MMHG | HEIGHT: 67 IN | BODY MASS INDEX: 25.62 KG/M2 | TEMPERATURE: 98.6 F | HEART RATE: 78 BPM

## 2022-02-22 DIAGNOSIS — Z12.11 ENCOUNTER FOR SCREENING FECAL OCCULT BLOOD TESTING: ICD-10-CM

## 2022-02-22 DIAGNOSIS — M47.816 LUMBAR SPONDYLOSIS: Primary | ICD-10-CM

## 2022-02-22 DIAGNOSIS — M54.50 CHRONIC MIDLINE LOW BACK PAIN WITHOUT SCIATICA: ICD-10-CM

## 2022-02-22 DIAGNOSIS — I10 ESSENTIAL HYPERTENSION: ICD-10-CM

## 2022-02-22 DIAGNOSIS — G89.29 CHRONIC MIDLINE LOW BACK PAIN WITHOUT SCIATICA: ICD-10-CM

## 2022-02-22 DIAGNOSIS — Z87.891 EX-SMOKER: ICD-10-CM

## 2022-02-22 DIAGNOSIS — I25.10 ASCVD (ARTERIOSCLEROTIC CARDIOVASCULAR DISEASE): ICD-10-CM

## 2022-02-22 DIAGNOSIS — E78.00 PURE HYPERCHOLESTEROLEMIA: ICD-10-CM

## 2022-02-22 DIAGNOSIS — G89.29 CHRONIC RIGHT-SIDED LOW BACK PAIN WITH RIGHT-SIDED SCIATICA: ICD-10-CM

## 2022-02-22 DIAGNOSIS — M54.41 CHRONIC RIGHT-SIDED LOW BACK PAIN WITH RIGHT-SIDED SCIATICA: ICD-10-CM

## 2022-02-22 DIAGNOSIS — Z23 ENCOUNTER FOR IMMUNIZATION: ICD-10-CM

## 2022-02-22 DIAGNOSIS — M48.061 SPINAL STENOSIS OF LUMBAR REGION WITHOUT NEUROGENIC CLAUDICATION: ICD-10-CM

## 2022-02-22 DIAGNOSIS — Z51.81 THERAPEUTIC DRUG MONITORING: ICD-10-CM

## 2022-02-22 PROCEDURE — 99214 OFFICE O/P EST MOD 30 MIN: CPT | Performed by: FAMILY MEDICINE

## 2022-02-22 RX ORDER — DULOXETIN HYDROCHLORIDE 20 MG/1
20 CAPSULE, DELAYED RELEASE ORAL DAILY
Qty: 90 CAPSULE | Refills: 3 | Status: SHIPPED | OUTPATIENT
Start: 2022-02-22 | End: 2022-11-04

## 2022-02-22 RX ORDER — DILTIAZEM HYDROCHLORIDE 180 MG/1
CAPSULE, COATED, EXTENDED RELEASE ORAL
Qty: 90 CAPSULE | Refills: 3 | Status: SHIPPED | OUTPATIENT
Start: 2022-02-22 | End: 2022-11-04

## 2022-02-22 RX ORDER — TRAMADOL HYDROCHLORIDE 50 MG/1
50 TABLET ORAL
Qty: 90 TABLET | Refills: 2 | Status: SHIPPED | OUTPATIENT
Start: 2022-03-21 | End: 2022-05-27 | Stop reason: SDUPTHER

## 2022-02-22 NOTE — PROGRESS NOTES
Subjective:   Hypertension    Pain Scale: 0/10  Pain Location:     1. Have you been to the ER, urgent care clinic since your last visit? Hospitalized since your last visit? No  2. Have you seen or consulted any other health care providers outside of the [address] [place] [address] since your last visit? Include any pap smears or colon screening. No    HPI:  DJD L-spine  Did well at first with inj from Dr Raymond Loo, gradually had recurrent LBP. Got second spine injection 7/2020, pain improved for a few days, but then got worse again. Able to perform all of his ADL's without difficulty now. Doing well on tramadol, taking about 3/d now. Back on cymbalta as adjunct for pain relief. Did good course of phys therapy in past, no help, not interested in more therapy at this time. UDS good 8/2021.  reviewed, in goal, last fill 10/22/21, so due today. Hypertension. Blood pressures good today. PT reports is taking meds regularly. Management at last visit included con't current tx. Current regimen: ACEI, calcium channel blocker, thiazide. Symptoms include no symptoms. Patient denies chest pain, palpitations, peripheral edema. Lab review:   Lab Results   Component Value Date/Time    Sodium 138 08/23/2021 09:28 AM    Potassium 4.2 08/23/2021 09:28 AM    Chloride 106 08/23/2021 09:28 AM    CO2 29 08/23/2021 09:28 AM    Anion gap 3 (L) 08/23/2021 09:28 AM    Glucose 70 08/23/2021 09:28 AM    BUN 24 (H) 08/23/2021 09:28 AM    Creatinine 0.85 08/23/2021 09:28 AM    BUN/Creatinine ratio 28 (H) 08/23/2021 09:28 AM    GFR est AA >60 08/23/2021 09:28 AM    GFR est non-AA >60 08/23/2021 09:28 AM    Calcium 9.6 08/23/2021 09:28 AM     Hyperlipidemia. On lipitor 20. Francis well. No myalgias, arthralgias, unusual weakness.   Lab Results   Component Value Date/Time    Cholesterol, total 132 08/23/2021 09:28 AM    HDL Cholesterol 42 08/23/2021 09:28 AM    LDL, calculated 65.2 08/23/2021 09:28 AM    VLDL, calculated 24.8 08/23/2021 09:28 AM Triglyceride 124 08/23/2021 09:28 AM    CHOL/HDL Ratio 3.1 08/23/2021 09:28 AM     Lab Results   Component Value Date/Time    ALT (SGPT) 49 08/23/2021 09:28 AM    Alk. phosphatase 98 08/23/2021 09:28 AM    Bilirubin, total 0.2 08/23/2021 09:28 AM     Smoking  Didn't do well with chantix in past. Working on getting quit, chewing a lot of nicorette gum. Is contemplative. PMH, SH, Medications/Allergies: reviewed, on chart. Current Outpatient Medications   Medication Sig    dilTIAZem ER (CARDIZEM CD) 180 mg capsule TAKE 1 CAPSULE EVERY DAY FOR PRESSURE AND PULSE (REPLACES NORVASC)    DULoxetine (CYMBALTA) 30 mg capsule TAKE 1 CAPSULE EVERY DAY FOR NERVE PAIN    atorvastatin (LIPITOR) 20 mg tablet TAKE 1 TABLET DAILY FOR CHOLESTEROL AND HEART    lisinopril-hydroCHLOROthiazide (PRINZIDE, ZESTORETIC) 20-12.5 mg per tablet TAKE 1 TABLET EVERY DAY (REPLACES PLAIN LISINOPRIL)    triamcinolone acetonide (KENALOG) 0.1 % topical cream Apply  to affected area two (2) times a day. use thin layer    ibuprofen (AdviL) 200 mg tablet Take  by mouth.  multivitamin (ONE A DAY) tablet Take 1 Tab by mouth daily.  aspirin delayed-release 81 mg tablet Take 1 Tab by mouth daily. Indications: prevent stroke, heart attack    traMADoL (ULTRAM) 50 mg tablet Take 1 Tablet by mouth three (3) times daily as needed for Pain for up to 90 days. Max Daily Amount: 150 mg. Indications: low back pain     No current facility-administered medications for this visit.       No Known Allergies  ROS:  Constitutional: No fever, chills or abnormal weight loss  Respiratory: No cough, SOB   CV: No chest pain or Palpitations    VS review:  Visit Vitals  /80 (BP 1 Location: Left arm)   Pulse 78   Temp 98.6 °F (37 °C) (Oral)   Resp 16   Ht 5' 7\" (1.702 m)   Wt 163 lb 3.2 oz (74 kg)   SpO2 99%   BMI 25.56 kg/m²     Wt Readings from Last 3 Encounters:   02/22/22 163 lb 3.2 oz (74 kg)   11/23/21 161 lb (73 kg)   08/23/21 161 lb 3.2 oz (73.1 kg) BP Readings from Last 3 Encounters:   02/22/22 120/80   11/23/21 (!) 142/82   08/23/21 136/78       Objective:     Physical Examination: General appearance - alert, well appearing, and in no distress  Mental status - alert, oriented to person, place, and time  Eyes - pupils equal and reactive, extraocular eye movements intact  ENT - bilateral external ears and nose normal. Normal lips  Neck - supple, no significant adenopathy, no thyromegaly or mass  Chest - bibasilar crackles on auscultation, no wheezes, rales. symmetric air entry. Heart - normal rate, regular rhythm, normal S1, S2, no murmurs, rubs, clicks or gallops  Extremities - peripheral pulses normal, no pedal edema, no clubbing or cyanosis    A/p:  DJD L-spine  Back doing worse lately. Con't to work on [employer], APAP as a booster. Discussed more therapy  (not ideal for pt as it has a 50$ copay), vs spine surgery eval. PT declined spine referral in past.  Check UDS. HTN  In goal now. Con't current regimen. con't lisinopril HCT 20/12.5mg daily. Recheck labs. HLD and ASCVD  High risk, 32.7% 10 yr ASCVD risk. Doing well on lipitor and ASA. Labs in goal last check, Plan recheck ~8/2021. Chart review shows aortic atherosclerosis on an old spine series. con't to work on BP control, lipid mgmt, smoking cessation. Smoking  Finally quit. Encouraged to stay quit. Hyperglycemia  Lab Results   Component Value Date/Time    Hemoglobin A1c 5.8 (H) 02/12/2019 08:40 AM   Not too bad. Plan monitor occasionally. Con't to work on [employer]. Mild eczema to flanks  Ok lately on kenalog 0.1% cr AAA BID PRN    Colon ca screening:  Dit FIT kit from [address] [place] [address] [address], told was negative. Still needs to get report. RPT FIT PRN. COVIDvax recommended. Pt will consider. F/U 3mo    Breonna Coleman MD    1. Have you been to the ER, urgent care clinic since your last visit? Hospitalized since your last visit? No    2.  Have you seen or consulted any other health care providers outside of the [address] [place] [address] since your last visit? Include any pap smears or colon screening. No    Identified pt with two pt identifiers(name and ). Reviewed record in preparation for visit and have obtained necessary documentation.     Symptom review:    NO  Fever   NO  Shaking chills  NO  Cough  NO Headaches  NO  Body aches  NO  Coughing up blood  NO  Chest congestion  NO  Chest pain  NO  Shortness of breath  NO  Profound Loss of smell/taste  NO  Nausea/Vomiting   NO  Loose stool/Diarrhea  NO  any skin issues

## 2022-02-23 LAB
ANION GAP SERPL CALC-SCNC: 3 MMOL/L (ref 5–15)
BUN SERPL-MCNC: 23 MG/DL (ref 6–20)
BUN/CREAT SERPL: 26 (ref 12–20)
CALCIUM SERPL-MCNC: 10.2 MG/DL (ref 8.5–10.1)
CHLORIDE SERPL-SCNC: 106 MMOL/L (ref 97–108)
CO2 SERPL-SCNC: 28 MMOL/L (ref 21–32)
CREAT SERPL-MCNC: 0.88 MG/DL (ref 0.7–1.3)
GLUCOSE SERPL-MCNC: 81 MG/DL (ref 65–100)
POTASSIUM SERPL-SCNC: 4 MMOL/L (ref 3.5–5.1)
SODIUM SERPL-SCNC: 137 MMOL/L (ref 136–145)

## 2022-02-24 LAB
AMPHETAMINES UR QL SCN: NEGATIVE NG/ML
BARBITURATES UR QL SCN: NEGATIVE NG/ML
BENZODIAZ UR QL: NEGATIVE NG/ML
BZE UR QL: NEGATIVE NG/ML
CANNABINOIDS UR QL SCN: NEGATIVE NG/ML
OPIATES UR QL: NEGATIVE NG/ML
PCP UR QL: NEGATIVE NG/ML

## 2022-03-04 LAB — HEMOCCULT STL QL IA: NEGATIVE

## 2022-03-18 PROBLEM — M48.061 SPINAL STENOSIS OF LUMBAR REGION WITHOUT NEUROGENIC CLAUDICATION: Status: ACTIVE | Noted: 2019-06-18

## 2022-03-19 PROBLEM — M54.50 LOW BACK PAIN: Status: ACTIVE | Noted: 2019-06-18

## 2022-03-19 PROBLEM — I25.10 ASCVD (ARTERIOSCLEROTIC CARDIOVASCULAR DISEASE): Status: ACTIVE | Noted: 2017-01-13

## 2022-03-19 PROBLEM — M51.36 DDD (DEGENERATIVE DISC DISEASE), LUMBAR: Status: ACTIVE | Noted: 2019-06-18

## 2022-03-19 PROBLEM — M47.816 LUMBAR SPONDYLOSIS: Status: ACTIVE | Noted: 2019-06-18

## 2022-03-19 PROBLEM — M51.369 DDD (DEGENERATIVE DISC DISEASE), LUMBAR: Status: ACTIVE | Noted: 2019-06-18

## 2022-05-27 ENCOUNTER — OFFICE VISIT (OUTPATIENT)
Dept: FAMILY MEDICINE CLINIC | Age: 72
End: 2022-05-27
Payer: MEDICARE

## 2022-05-27 VITALS
DIASTOLIC BLOOD PRESSURE: 64 MMHG | RESPIRATION RATE: 22 BRPM | HEIGHT: 67 IN | BODY MASS INDEX: 25.27 KG/M2 | HEART RATE: 77 BPM | OXYGEN SATURATION: 99 % | WEIGHT: 161 LBS | SYSTOLIC BLOOD PRESSURE: 142 MMHG | TEMPERATURE: 97.5 F

## 2022-05-27 DIAGNOSIS — M54.50 CHRONIC MIDLINE LOW BACK PAIN WITHOUT SCIATICA: ICD-10-CM

## 2022-05-27 DIAGNOSIS — Z87.891 EX-SMOKER: ICD-10-CM

## 2022-05-27 DIAGNOSIS — M47.816 LUMBAR SPONDYLOSIS: ICD-10-CM

## 2022-05-27 DIAGNOSIS — Z00.00 MEDICARE ANNUAL WELLNESS VISIT, SUBSEQUENT: Primary | ICD-10-CM

## 2022-05-27 DIAGNOSIS — Z13.31 SCREENING FOR DEPRESSION: ICD-10-CM

## 2022-05-27 DIAGNOSIS — G89.29 CHRONIC RIGHT-SIDED LOW BACK PAIN WITH RIGHT-SIDED SCIATICA: ICD-10-CM

## 2022-05-27 DIAGNOSIS — I25.10 ASCVD (ARTERIOSCLEROTIC CARDIOVASCULAR DISEASE): ICD-10-CM

## 2022-05-27 DIAGNOSIS — E78.00 PURE HYPERCHOLESTEROLEMIA: ICD-10-CM

## 2022-05-27 DIAGNOSIS — I10 PRIMARY HYPERTENSION: ICD-10-CM

## 2022-05-27 DIAGNOSIS — Z13.39 SCREENING FOR ALCOHOLISM: ICD-10-CM

## 2022-05-27 DIAGNOSIS — M54.41 CHRONIC RIGHT-SIDED LOW BACK PAIN WITH RIGHT-SIDED SCIATICA: ICD-10-CM

## 2022-05-27 DIAGNOSIS — G89.29 CHRONIC MIDLINE LOW BACK PAIN WITHOUT SCIATICA: ICD-10-CM

## 2022-05-27 PROCEDURE — G0439 PPPS, SUBSEQ VISIT: HCPCS | Performed by: FAMILY MEDICINE

## 2022-05-27 PROCEDURE — G0444 DEPRESSION SCREEN ANNUAL: HCPCS | Performed by: FAMILY MEDICINE

## 2022-05-27 PROCEDURE — 99214 OFFICE O/P EST MOD 30 MIN: CPT | Performed by: FAMILY MEDICINE

## 2022-05-27 RX ORDER — TRAMADOL HYDROCHLORIDE 50 MG/1
50 TABLET ORAL
Qty: 90 TABLET | Refills: 2 | Status: SHIPPED | OUTPATIENT
Start: 2022-05-27 | End: 2022-10-07 | Stop reason: SDUPTHER

## 2022-05-27 NOTE — PROGRESS NOTES
Roby Dawson is a 67 y.o. male presenting for/with:    Chief Complaint   Patient presents with    Hypertension     3 month follow up       Visit Vitals  BP (!) 142/64   Pulse 77   Temp 97.5 °F (36.4 °C) (Temporal)   Resp 22   Ht 5' 7\" (1.702 m)   Wt 161 lb (73 kg)   SpO2 99%   BMI 25.22 kg/m²     Pain Scale: /10  Pain Location:     1. \"Have you been to the ER, urgent care clinic since your last visit? Hospitalized since your last visit? \" No    2. \"Have you seen or consulted any other health care providers outside of the [address] [place] [address] since your last visit? \" No     3. For patients aged 39-70: Has the patient had a colonoscopy / FIT/ Cologuard? Yes - no Care Gap present      If the patient is female:    4. For patients aged 41-77: Has the patient had a mammogram within the past 2 years? NA - based on age or sex      11. For patients aged 21-65: Has the patient had a pap smear?  NA - based on age or sex      Symptom review:  NO  Fever   NO  Shaking chills  NO  Cough  NO  Body aches  NO  Coughing up blood  NO  Chest congestion  NO  Chest pain  NO  Shortness of breath  NO  Profound Loss of smell/taste  NO  Nausea/Vomiting   NO  Loose stool/Diarrhea  NO  any skin issues    Patient Risk Factors Reviewed as follows:  NO  have you been in Close contact with confirmed COVID19 patient   NO  History of recent travel to affected geographical areas within the past 14 days  NO  COPD  NO  Active Cancer/Leukemia/Lymphoma/Chemotherapy  NO  Oral steroid use  NO  Pregnant  NO  Diabetes Mellitus  NO  Heart disease  NO  Asthma  NO Health care worker at home  NO Health care worker  NO Is there a Pregnant Woman in the home  NO Dialysis pt in the home   NO a large number of people living in the home    Learning Assessment 2/22/2022   PRIMARY LEARNER Patient   PRIMARY LANGUAGE ENGLISH   LEARNER PREFERENCE PRIMARY READING     -   ANSWERED BY Patient   RELATIONSHIP SELF     Fall Risk Assessment, last 12 mths 5/27/2022   Able to walk? Yes   Fall in past 12 months? 0   Do you feel unsteady? 0   Are you worried about falling 0   Is TUG test greater than 12 seconds? -   Is the gait abnormal? -   Number of falls in past 12 months -   Fall with injury? -       3 most recent PHQ Screens 5/27/2022   PHQ Not Done -   Little interest or pleasure in doing things Not at all   Feeling down, depressed, irritable, or hopeless Not at all   Total Score PHQ 2 0   Trouble falling or staying asleep, or sleeping too much -   Feeling tired or having little energy -   Poor appetite, weight loss, or overeating -   Feeling bad about yourself - or that you are a failure or have let yourself or your family down -   Trouble concentrating on things such as school, work, reading, or watching TV -   Moving or speaking so slowly that other people could have noticed; or the opposite being so fidgety that others notice -   Thoughts of being better off dead, or hurting yourself in some way -   PHQ 9 Score -   How difficult have these problems made it for you to do your work, take care of your home and get along with others -     Abuse Screening Questionnaire 5/27/2022   Do you ever feel afraid of your partner? N   Are you in a relationship with someone who physically or mentally threatens you? N   Is it safe for you to go home?  Y       ADL Assessment 5/27/2022   Feeding yourself No Help Needed   Getting from bed to chair No Help Needed   Getting dressed No Help Needed   Bathing or showering No Help Needed   Walk across the room (includes cane/walker) No Help Needed   Using the telphone No Help Needed   Taking your medications No Help Needed   Preparing meals No Help Needed   Managing money (expenses/bills) No Help Needed   Moderately strenuous housework (laundry) No Help Needed   Shopping for personal items (toiletries/medicines) No Help Needed   Shopping for groceries No Help Needed   Driving No Help Needed   Climbing a flight of stairs No Help Needed   Getting to places beyond walking distances No Help Needed      Advance Care Planning 5/27/2022   Patient's Healthcare Decision Maker is: Named in scanned ACP document   Confirm Advance Directive Yes, on file        This is the Subsequent Medicare Annual Wellness Exam, performed 12 months or more after the Initial AWV or the last Subsequent AWV    I have reviewed the patient's medical history in detail and updated the computerized patient record. Assessment/Plan   Education and counseling provided:  Are appropriate based on today's review and evaluation    1.  Medicare annual wellness visit, subsequent       Depression Risk Factor Screening     3 most recent PHQ Screens 5/27/2022   PHQ Not Done -   Little interest or pleasure in doing things Not at all   Feeling down, depressed, irritable, or hopeless Not at all   Total Score PHQ 2 0   Trouble falling or staying asleep, or sleeping too much -   Feeling tired or having little energy -   Poor appetite, weight loss, or overeating -   Feeling bad about yourself - or that you are a failure or have let yourself or your family down -   Trouble concentrating on things such as school, work, reading, or watching TV -   Moving or speaking so slowly that other people could have noticed; or the opposite being so fidgety that others notice -   Thoughts of being better off dead, or hurting yourself in some way -   PHQ 9 Score -   How difficult have these problems made it for you to do your work, take care of your home and get along with others -       Alcohol & Drug Abuse Risk Screen    Do you average more than 1 drink per night or more than 7 drinks a week: No    In the past three months have you have had more than 4 drinks containing alcohol on one occasion: No         Opioid Risk: (Low risk score <55, High risk score ?55)  Opioid risk score: 25      Click here to complete the Controlled Substance Monitoring SmartForm    Last PDMP Hi as Reviewed:  Review User Review Instant Review Result MARIJA JOLLEY 2/22/2022  8:21 AM Reviewed PDMP [1]         Functional Ability and Level of Safety    Hearing: Hearing is good. Activities of Daily Living: The home contains: no safety equipment. Patient does total self care      Ambulation: with no difficulty     Fall Risk:  Fall Risk Assessment, last 12 mths 5/27/2022   Able to walk? Yes   Fall in past 12 months? 0   Do you feel unsteady? 0   Are you worried about falling 0   Is TUG test greater than 12 seconds? -   Is the gait abnormal? -   Number of falls in past 12 months -   Fall with injury? -      Abuse Screen:  Patient is not abused       Cognitive Screening    Has your family/caregiver stated any concerns about your memory: no         Health Maintenance Due     Health Maintenance Due   Topic Date Due    COVID-19 Vaccine (1) Never done    AAA Screening 73-69 YO Male Smoking Patients  Never done       Patient Care Team   Patient Care Team:  Cristi Simons MD as PCP - General (Family Medicine)  Cristi Simons MD as PCP - [facility] Browning Provider    History     Patient Active Problem List   Diagnosis Code    Primary hypertension I10    Nephrolithiasis N20.0    Ex-smoker Z87.891    Pure hypercholesterolemia E78.00    Post-traumatic osteoarthritis of left knee M17.32    ASCVD (arteriosclerotic cardiovascular disease) I25.10    Low back pain M54.50    Lumbar spondylosis M47.816    Spinal stenosis of lumbar region without neurogenic claudication M48.061    DDD (degenerative disc disease), lumbar M51.36     Past Medical History:   Diagnosis Date    Arthritis     Hypertension       No past surgical history on file. Current Outpatient Medications   Medication Sig Dispense Refill    dilTIAZem ER (CARDIZEM CD) 180 mg capsule TAKE 1 CAPSULE EVERY DAY FOR PRESSURE AND PULSE 90 Capsule 3    DULoxetine (CYMBALTA) 20 mg capsule Take 1 Capsule by mouth daily.  Indications: arthritis and nerves 90 Capsule 3    traMADoL (ULTRAM) 50 mg tablet Take 1 Tablet by mouth three (3) times daily as needed for Pain for up to 90 days. Max Daily Amount: 150 mg. Indications: low back pain 90 Tablet 2    atorvastatin (LIPITOR) 20 mg tablet TAKE 1 TABLET DAILY FOR CHOLESTEROL AND HEART 90 Tablet 3    lisinopril-hydroCHLOROthiazide (PRINZIDE, ZESTORETIC) 20-12.5 mg per tablet TAKE 1 TABLET EVERY DAY (REPLACES PLAIN LISINOPRIL) 90 Tablet 3    triamcinolone acetonide (KENALOG) 0.1 % topical cream Apply  to affected area two (2) times a day. use thin layer 60 g 11    ibuprofen (AdviL) 200 mg tablet Take  by mouth.  multivitamin (ONE A DAY) tablet Take 1 Tab by mouth daily.  aspirin delayed-release 81 mg tablet Take 1 Tab by mouth daily. Indications: prevent stroke, heart attack 100 Tab 3     No Known Allergies    No family history on file.   Social History     Tobacco Use    Smoking status: Current Every Day Smoker     Packs/day: 0.50     Years: 20.00     Pack years: 10.00     Types: Cigarettes    Smokeless tobacco: Never Used   Substance Use Topics    Alcohol use: No         Terri Fuller

## 2022-05-27 NOTE — PROGRESS NOTES
Subjective:   Hypertension (3 month follow up)    Pain Scale: 0/10  Pain Location:     1. Have you been to the ER, urgent care clinic since your last visit? Hospitalized since your last visit? No  2. Have you seen or consulted any other health care providers outside of the [address] [place] [address] since your last visit? Include any pap smears or colon screening. No    HPI:  DJD L-spine  Doing well since last visit on the tramadol 50 TID. Did well at first with inj from Dr Óscar Samuel, gradually had recurrent LBP. Got second spine injection 7/2020, pain improved for a few days, but then got worse again. Able to perform all of his ADL's without difficulty now. Doing well on tramadol, taking about 3/d now. Remains on cymbalta as adjunct for pain relief. Did good course of phys therapy in past, no help, not interested in more therapy at this time. UDS good 2/2022.  reviewed, in goal, so due today. Hypertension. Blood pressures good today. PT reports is taking meds regularly. Management at last visit included con't current tx. Current regimen: ACEI, calcium channel blocker, thiazide. Symptoms include no symptoms. Patient denies chest pain, palpitations, peripheral edema. Lab review:   Lab Results   Component Value Date/Time    Sodium 137 02/22/2022 08:26 AM    Potassium 4.0 02/22/2022 08:26 AM    Chloride 106 02/22/2022 08:26 AM    CO2 28 02/22/2022 08:26 AM    Anion gap 3 (L) 02/22/2022 08:26 AM    Glucose 81 02/22/2022 08:26 AM    BUN 23 (H) 02/22/2022 08:26 AM    Creatinine 0.88 02/22/2022 08:26 AM    BUN/Creatinine ratio 26 (H) 02/22/2022 08:26 AM    GFR est AA >60 02/22/2022 08:26 AM    GFR est non-AA >60 02/22/2022 08:26 AM    Calcium 10.2 (H) 02/22/2022 08:26 AM     Hyperlipidemia. On lipitor 20. Francis well. No myalgias, arthralgias, unusual weakness.   Lab Results   Component Value Date/Time    Cholesterol, total 132 08/23/2021 09:28 AM    HDL Cholesterol 42 08/23/2021 09:28 AM    LDL, calculated 65.2 08/23/2021 09:28 AM    VLDL, calculated 24.8 08/23/2021 09:28 AM    Triglyceride 124 08/23/2021 09:28 AM    CHOL/HDL Ratio 3.1 08/23/2021 09:28 AM     Lab Results   Component Value Date/Time    ALT (SGPT) 49 08/23/2021 09:28 AM    Alk. phosphatase 98 08/23/2021 09:28 AM    Bilirubin, total 0.2 08/23/2021 09:28 AM     Former Smoker  Still quit since last visit. Encouraged! PMH, SH, Medications/Allergies: reviewed, on chart. Current Outpatient Medications   Medication Sig    dilTIAZem ER (CARDIZEM CD) 180 mg capsule TAKE 1 CAPSULE EVERY DAY FOR PRESSURE AND PULSE    DULoxetine (CYMBALTA) 20 mg capsule Take 1 Capsule by mouth daily. Indications: arthritis and nerves    traMADoL (ULTRAM) 50 mg tablet Take 1 Tablet by mouth three (3) times daily as needed for Pain for up to 90 days. Max Daily Amount: 150 mg. Indications: low back pain    atorvastatin (LIPITOR) 20 mg tablet TAKE 1 TABLET DAILY FOR CHOLESTEROL AND HEART    lisinopril-hydroCHLOROthiazide (PRINZIDE, ZESTORETIC) 20-12.5 mg per tablet TAKE 1 TABLET EVERY DAY (REPLACES PLAIN LISINOPRIL)    triamcinolone acetonide (KENALOG) 0.1 % topical cream Apply  to affected area two (2) times a day. use thin layer    ibuprofen (AdviL) 200 mg tablet Take  by mouth.  multivitamin (ONE A DAY) tablet Take 1 Tab by mouth daily.  aspirin delayed-release 81 mg tablet Take 1 Tab by mouth daily. Indications: prevent stroke, heart attack     No current facility-administered medications for this visit.       No Known Allergies  ROS:  Constitutional: No fever, chills or abnormal weight loss  Respiratory: No cough, SOB   CV: No chest pain or Palpitations    VS review:  Visit Vitals  BP (!) 142/64   Pulse 77   Temp 97.5 °F (36.4 °C) (Temporal)   Resp 22   Ht 5' 7\" (1.702 m)   Wt 161 lb (73 kg)   SpO2 99%   BMI 25.22 kg/m²     Wt Readings from Last 3 Encounters:   05/27/22 161 lb (73 kg)   02/22/22 163 lb 3.2 oz (74 kg)   11/23/21 161 lb (73 kg)     BP Readings from Last 3 Encounters:   05/27/22 (!) 142/64   02/22/22 120/80   11/23/21 (!) 142/82       Objective:     Physical Examination: General appearance - alert, well appearing, and in no distress  Mental status - alert, oriented to person, place, and time  Eyes - pupils equal and reactive, extraocular eye movements intact  ENT - bilateral external ears and nose normal. Normal lips  Neck - supple, no significant adenopathy, no thyromegaly or mass  Chest - bibasilar crackles on auscultation, no wheezes, rales. symmetric air entry. Heart - normal rate, regular rhythm, normal S1, S2, no murmurs, rubs, clicks or gallops  Extremities - peripheral pulses normal, no pedal edema, no clubbing or cyanosis    A/p:  DJD L-spine  Back doing better lately. Con't tramadol 50mg TID PRN, con't on TLC's, APAP as a booster. Discussed more therapy  (not ideal for pt as it has a 50$ copay), vs spine surgery eval. PT declined spine referral in past.  Check UDS DUE ~8/2022. HTN  Pretty much in In goal, but a little high. Check BP's at home. Con't current regimen. con't lisinopril HCT 20/12.5mg daily. Recheck labs. HLD and ASCVD  High risk, 32.7% 10 yr ASCVD risk. Doing well on lipitor and ASA. Labs in goal last check, Plan recheck ~8/2021. Chart review shows aortic atherosclerosis on an old spine series. con't to work on BP control, lipid mgmt, smoking cessation. Smoking  Finally quit. Encouraged to stay quit. Hyperglycemia  Lab Results   Component Value Date/Time    Hemoglobin A1c 5.8 (H) 02/12/2019 08:40 AM   Not too bad. Plan monitor occasionally. Con't to work on [employer]. Mild eczema to flanks  Ok lately on kenalog 0.1% cr AAA BID PRN    Colon ca screening: in goal    COVIDvax recommended. Pt will consider.     F/U 3mo    Afsaneh Noble MD

## 2022-05-27 NOTE — PATIENT INSTRUCTIONS
Medicare Wellness Visit, Male    The best way to live healthy is to have a lifestyle where you eat a well-balanced diet, exercise regularly, limit alcohol use, and quit all forms of tobacco/nicotine, if applicable. Regular preventive services are another way to keep healthy. Preventive services (vaccines, screening tests, monitoring & exams) can help personalize your care plan, which helps you manage your own care. Screening tests can find health problems at the earliest stages, when they are easiest to treat. [facility] follows the current, evidence-based guidelines published by the [facility] ([facility]) when recommending preventive services for our patients. Because we follow these guidelines, sometimes recommendations change over time as research supports it. (For example, a prostate screening blood test is no longer routinely recommended for men with no symptoms). Of course, you and your doctor may decide to screen more often for some diseases, based on your risk and co-morbidities (chronic disease you are already diagnosed with). Preventive services for you include:  - Medicare offers their members a free annual wellness visit, which is time for you and your primary care provider to discuss and plan for your preventive service needs. Take advantage of this benefit every year!  -All adults over age 72 should receive the recommended pneumonia vaccines. Current USPSTF guidelines recommend a series of two vaccines for the best pneumonia protection.   -All adults should have a flu vaccine yearly and tetanus vaccine every 10 years.  -All adults age 48 and older should receive the shingles vaccines (series of two vaccines).        -All adults age 38-68 who are overweight should have a diabetes screening test once every three years.   -Other screening tests & preventive services for persons with diabetes include: an eye exam to screen for diabetic retinopathy, a kidney function test, a foot exam, and stricter control over your cholesterol.   -Cardiovascular screening for adults with routine risk involves an electrocardiogram (ECG) at intervals determined by the provider.   -Colorectal cancer screening should be done for adults age 54-65 with no increased risk factors for colorectal cancer. There are a number of acceptable methods of screening for this type of cancer. Each test has its own benefits and drawbacks. Discuss with your provider what is most appropriate for you during your annual wellness visit. The different tests include: colonoscopy (considered the best screening method), a fecal occult blood test, a fecal DNA test, and sigmoidoscopy.  -All adults born between [facility] should be screened once for Hepatitis C.  -An Abdominal Aortic Aneurysm (AAA) Screening is recommended for men age 73-68 who has ever smoked in their lifetime.      Here is a list of your current Health Maintenance items (your personalized list of preventive services) with a due date:  Health Maintenance Due   Topic Date Due    COVID-19 Vaccine (1) Never done    AAA Screening  Never done

## 2022-06-21 DIAGNOSIS — L30.9 ECZEMA, UNSPECIFIED TYPE: ICD-10-CM

## 2022-06-21 RX ORDER — TRIAMCINOLONE ACETONIDE 1 MG/G
CREAM TOPICAL
Qty: 60 G | Refills: 11 | Status: SHIPPED | OUTPATIENT
Start: 2022-06-21

## 2022-07-08 DIAGNOSIS — E78.00 PURE HYPERCHOLESTEROLEMIA: ICD-10-CM

## 2022-07-08 DIAGNOSIS — I10 ESSENTIAL HYPERTENSION: ICD-10-CM

## 2022-07-11 RX ORDER — ATORVASTATIN CALCIUM 20 MG/1
TABLET, FILM COATED ORAL
Qty: 90 TABLET | Refills: 3 | Status: SHIPPED | OUTPATIENT
Start: 2022-07-11

## 2022-07-11 RX ORDER — LISINOPRIL AND HYDROCHLOROTHIAZIDE 12.5; 2 MG/1; MG/1
TABLET ORAL
Qty: 90 TABLET | Refills: 3 | Status: SHIPPED | OUTPATIENT
Start: 2022-07-11

## 2022-10-07 ENCOUNTER — OFFICE VISIT (OUTPATIENT)
Dept: FAMILY MEDICINE CLINIC | Age: 72
End: 2022-10-07
Payer: MEDICARE

## 2022-10-07 VITALS
TEMPERATURE: 98.7 F | HEIGHT: 67 IN | OXYGEN SATURATION: 99 % | RESPIRATION RATE: 18 BRPM | HEART RATE: 70 BPM | DIASTOLIC BLOOD PRESSURE: 70 MMHG | BODY MASS INDEX: 23.9 KG/M2 | WEIGHT: 152.25 LBS | SYSTOLIC BLOOD PRESSURE: 120 MMHG

## 2022-10-07 DIAGNOSIS — E78.00 PURE HYPERCHOLESTEROLEMIA: ICD-10-CM

## 2022-10-07 DIAGNOSIS — I25.10 ASCVD (ARTERIOSCLEROTIC CARDIOVASCULAR DISEASE): ICD-10-CM

## 2022-10-07 DIAGNOSIS — G89.29 CHRONIC MIDLINE LOW BACK PAIN WITHOUT SCIATICA: ICD-10-CM

## 2022-10-07 DIAGNOSIS — K04.7 DENTAL ABSCESS: Primary | ICD-10-CM

## 2022-10-07 DIAGNOSIS — Z51.81 THERAPEUTIC DRUG MONITORING: ICD-10-CM

## 2022-10-07 DIAGNOSIS — M54.50 CHRONIC MIDLINE LOW BACK PAIN WITHOUT SCIATICA: ICD-10-CM

## 2022-10-07 DIAGNOSIS — I10 PRIMARY HYPERTENSION: ICD-10-CM

## 2022-10-07 DIAGNOSIS — M51.36 DDD (DEGENERATIVE DISC DISEASE), LUMBAR: ICD-10-CM

## 2022-10-07 DIAGNOSIS — M47.816 LUMBAR SPONDYLOSIS: ICD-10-CM

## 2022-10-07 PROCEDURE — 99214 OFFICE O/P EST MOD 30 MIN: CPT | Performed by: FAMILY MEDICINE

## 2022-10-07 RX ORDER — TRAMADOL HYDROCHLORIDE 50 MG/1
50 TABLET ORAL
Qty: 90 TABLET | Refills: 2 | Status: SHIPPED | OUTPATIENT
Start: 2022-10-07 | End: 2023-01-05

## 2022-10-07 RX ORDER — PENICILLIN V POTASSIUM 500 MG/1
1000 TABLET, FILM COATED ORAL 2 TIMES DAILY
Qty: 56 TABLET | Refills: 0 | Status: SHIPPED | OUTPATIENT
Start: 2022-10-07 | End: 2022-10-21

## 2022-10-07 NOTE — PROGRESS NOTES
Visit Vitals  /70   Pulse 70   Temp 98.7 °F (37.1 °C) (Temporal)   Resp 18   Ht 5' 7\" (1.702 m)   Wt 152 lb 4 oz (69.1 kg)   SpO2 99%   BMI 23.85 kg/m²     Chief Complaint   Patient presents with    Dental Pain     1. Have you been to the ER, urgent care clinic since your last visit? Hospitalized since your last visit? No    2. Have you seen or consulted any other health care providers outside of the [address] [place] [address] since your last visit? Include any pap smears or colon screening.  No

## 2022-10-07 NOTE — PROGRESS NOTES
Subjective:   Dental Pain    HPI:  R upper jaw dental abscess  As been bothersome for past month, much more lately. Swollen upper jaw, upper cheek R side. Has a broken tooth, talked to dental, they rec abx first, then will see in [address]. DJD L-spine  Doing well since last visit on the tramadol 50 TID. Did well at first with inj from Dr Pranay Neely, gradually had recurrent LBP. Got second spine injection 7/2020, pain improved for a few days, but then got worse again. Able to perform all of his ADL's without difficulty now. Doing well on tramadol, taking about 3/d now. Remains on cymbalta as adjunct for pain relief. Did good course of phys therapy in past, no help, not interested in more therapy at this time. UDS good 2/2022.  reviewed, in goal, so due today. Hypertension. Blood pressures good today. PT reports is taking meds regularly. Management at last visit included con't current tx. Current regimen: ACEI, calcium channel blocker, thiazide. Symptoms include no symptoms. Patient denies chest pain, palpitations, peripheral edema. Lab review:   Lab Results   Component Value Date/Time    Sodium 137 02/22/2022 08:26 AM    Potassium 4.0 02/22/2022 08:26 AM    Chloride 106 02/22/2022 08:26 AM    CO2 28 02/22/2022 08:26 AM    Anion gap 3 (L) 02/22/2022 08:26 AM    Glucose 81 02/22/2022 08:26 AM    BUN 23 (H) 02/22/2022 08:26 AM    Creatinine 0.88 02/22/2022 08:26 AM    BUN/Creatinine ratio 26 (H) 02/22/2022 08:26 AM    GFR est AA >60 02/22/2022 08:26 AM    GFR est non-AA >60 02/22/2022 08:26 AM    Calcium 10.2 (H) 02/22/2022 08:26 AM     Hyperlipidemia. On lipitor 20. Francis well. No myalgias, arthralgias, unusual weakness.   Lab Results   Component Value Date/Time    Cholesterol, total 132 08/23/2021 09:28 AM    HDL Cholesterol 42 08/23/2021 09:28 AM    LDL, calculated 65.2 08/23/2021 09:28 AM    VLDL, calculated 24.8 08/23/2021 09:28 AM    Triglyceride 124 08/23/2021 09:28 AM    CHOL/HDL Ratio 3.1 08/23/2021 09:28 AM Lab Results   Component Value Date/Time    ALT (SGPT) 49 08/23/2021 09:28 AM    Alk. phosphatase 98 08/23/2021 09:28 AM    Bilirubin, total 0.2 08/23/2021 09:28 AM     Former Smoker  Still quit since last visit. Encouraged! PMH, SH, Medications/Allergies: reviewed, on chart. Current Outpatient Medications   Medication Sig    lisinopril-hydroCHLOROthiazide (PRINZIDE, ZESTORETIC) 20-12.5 mg per tablet TAKE 1 TABLET EVERY DAY (REPLACES PLAIN LISINOPRIL)    atorvastatin (LIPITOR) 20 mg tablet TAKE 1 TABLET DAILY FOR CHOLESTEROL AND HEART    triamcinolone acetonide (KENALOG) 0.1 % topical cream APPLY A THIN LAYER TO AFFECTED AREA TWICE DAILY    traMADoL (ULTRAM) 50 mg tablet Take 1 Tablet by mouth three (3) times daily as needed for Pain for up to 90 days. Max Daily Amount: 150 mg. Indications: low back pain    dilTIAZem ER (CARDIZEM CD) 180 mg capsule TAKE 1 CAPSULE EVERY DAY FOR PRESSURE AND PULSE    DULoxetine (CYMBALTA) 20 mg capsule Take 1 Capsule by mouth daily. Indications: arthritis and nerves    ibuprofen (AdviL) 200 mg tablet Take  by mouth.    multivitamin (ONE A DAY) tablet Take 1 Tab by mouth daily. aspirin delayed-release 81 mg tablet Take 1 Tab by mouth daily. Indications: prevent stroke, heart attack     No current facility-administered medications for this visit.       No Known Allergies  ROS:  Constitutional: No fever, chills or abnormal weight loss  Respiratory: No cough, SOB   CV: No chest pain or Palpitations    VS review:  Visit Vitals  /70   Pulse 70   Temp 98.7 °F (37.1 °C) (Temporal)   Resp 18   Ht 5' 7\" (1.702 m)   Wt 152 lb 4 oz (69.1 kg)   SpO2 99%   BMI 23.85 kg/m²     Wt Readings from Last 3 Encounters:   10/07/22 152 lb 4 oz (69.1 kg)   05/27/22 161 lb (73 kg)   02/22/22 163 lb 3.2 oz (74 kg)     BP Readings from Last 3 Encounters:   10/07/22 120/70   05/27/22 (!) 142/64   02/22/22 120/80       Objective:     Physical Examination: General appearance - alert, well appearing, and in no distress  Mental status - alert, oriented to person, place, and time  Eyes - pupils equal and reactive, extraocular eye movements intact  ENT - bilateral external ears and nose normal. Normal lips  Neck - supple, no significant adenopathy, no thyromegaly or mass  Chest - bibasilar crackles on auscultation, no wheezes, rales. symmetric air entry. Heart - normal rate, regular rhythm, normal S1, S2, no murmurs, rubs, clicks or gallops  Extremities - peripheral pulses normal, no pedal edema, no clubbing or cyanosis    A/p:  Dental abscess R upper jaw, lateral incisor  Tx with Pen VK    HTN  Pretty much in In goal, but a little high. Check BP's at home. Con't current regimen. con't lisinopril HCT 20/12.5mg daily. Recheck labs. HLD and ASCVD  High risk, 32.7% 10 yr ASCVD risk. Doing well on lipitor and ASA. Labs in goal last check, Recheck ~8/2021. Chart review shows aortic atherosclerosis on an old spine series. con't to work on BP control, lipid mgmt, smoking cessation. Smoking  Still quit. Encouraged to stay quit. Hyperglycemia  Lab Results   Component Value Date/Time    Hemoglobin A1c 5.8 (H) 02/12/2019 08:40 AM   Not too bad. Plan monitor occasionally. Con't to work on [employer]. Mild eczema to flanks  Ok lately on kenalog 0.1% cr AAA BID PRN    Colon ca screening: in goal    COVIDvax recommended. Pt will consider.     F/U 3mo    Christa Thornton MD

## 2022-10-08 LAB
ALBUMIN SERPL-MCNC: 4.2 G/DL (ref 3.5–5)
ALBUMIN/GLOB SERPL: 1.3 {RATIO} (ref 1.1–2.2)
ALP SERPL-CCNC: 113 U/L (ref 45–117)
ALT SERPL-CCNC: 39 U/L (ref 12–78)
ANION GAP SERPL CALC-SCNC: 9 MMOL/L (ref 5–15)
AST SERPL-CCNC: 22 U/L (ref 15–37)
BILIRUB SERPL-MCNC: 0.4 MG/DL (ref 0.2–1)
BUN SERPL-MCNC: 16 MG/DL (ref 6–20)
BUN/CREAT SERPL: 15 (ref 12–20)
CALCIUM SERPL-MCNC: 9.4 MG/DL (ref 8.5–10.1)
CHLORIDE SERPL-SCNC: 101 MMOL/L (ref 97–108)
CHOLEST SERPL-MCNC: 143 MG/DL
CO2 SERPL-SCNC: 27 MMOL/L (ref 21–32)
CREAT SERPL-MCNC: 1.05 MG/DL (ref 0.7–1.3)
GLOBULIN SER CALC-MCNC: 3.2 G/DL (ref 2–4)
GLUCOSE SERPL-MCNC: 101 MG/DL (ref 65–100)
HDLC SERPL-MCNC: 37 MG/DL
HDLC SERPL: 3.9 {RATIO} (ref 0–5)
LDLC SERPL CALC-MCNC: 74.6 MG/DL (ref 0–100)
POTASSIUM SERPL-SCNC: 4.1 MMOL/L (ref 3.5–5.1)
PROT SERPL-MCNC: 7.4 G/DL (ref 6.4–8.2)
SODIUM SERPL-SCNC: 137 MMOL/L (ref 136–145)
TRIGL SERPL-MCNC: 157 MG/DL (ref ?–150)
VLDLC SERPL CALC-MCNC: 31.4 MG/DL

## 2022-10-28 ENCOUNTER — OFFICE VISIT (OUTPATIENT)
Dept: FAMILY MEDICINE CLINIC | Age: 72
End: 2022-10-28
Payer: MEDICARE

## 2022-10-28 ENCOUNTER — TELEPHONE (OUTPATIENT)
Dept: FAMILY MEDICINE CLINIC | Age: 72
End: 2022-10-28

## 2022-10-28 VITALS — OXYGEN SATURATION: 99 % | TEMPERATURE: 97.1 F | HEART RATE: 71 BPM | RESPIRATION RATE: 20 BRPM

## 2022-10-28 DIAGNOSIS — R05.1 ACUTE COUGH: ICD-10-CM

## 2022-10-28 DIAGNOSIS — J06.9 VIRAL UPPER RESPIRATORY TRACT INFECTION: Primary | ICD-10-CM

## 2022-10-28 LAB
EXP DATE SOLUTION: NORMAL
EXP DATE SWAB: NORMAL
LOT NUMBER SOLUTION: NORMAL
LOT NUMBER SWAB: NORMAL
QUICKVUE INFLUENZA TEST: NEGATIVE
SARS-COV-2 RNA POC: NEGATIVE
VALID INTERNAL CONTROL?: YES

## 2022-10-28 PROCEDURE — G8756 NO BP MEASURE DOC: HCPCS | Performed by: FAMILY MEDICINE

## 2022-10-28 PROCEDURE — 87635 SARS-COV-2 COVID-19 AMP PRB: CPT | Performed by: FAMILY MEDICINE

## 2022-10-28 PROCEDURE — G8536 NO DOC ELDER MAL SCRN: HCPCS | Performed by: FAMILY MEDICINE

## 2022-10-28 PROCEDURE — G8420 CALC BMI NORM PARAMETERS: HCPCS | Performed by: FAMILY MEDICINE

## 2022-10-28 PROCEDURE — 3017F COLORECTAL CA SCREEN DOC REV: CPT | Performed by: FAMILY MEDICINE

## 2022-10-28 PROCEDURE — 99213 OFFICE O/P EST LOW 20 MIN: CPT | Performed by: FAMILY MEDICINE

## 2022-10-28 PROCEDURE — 87804 INFLUENZA ASSAY W/OPTIC: CPT | Performed by: FAMILY MEDICINE

## 2022-10-28 PROCEDURE — 1101F PT FALLS ASSESS-DOCD LE1/YR: CPT | Performed by: FAMILY MEDICINE

## 2022-10-28 PROCEDURE — G8427 DOCREV CUR MEDS BY ELIG CLIN: HCPCS | Performed by: FAMILY MEDICINE

## 2022-10-28 PROCEDURE — 1123F ACP DISCUSS/DSCN MKR DOCD: CPT | Performed by: FAMILY MEDICINE

## 2022-10-28 PROCEDURE — G8510 SCR DEP NEG, NO PLAN REQD: HCPCS | Performed by: FAMILY MEDICINE

## 2022-10-28 RX ORDER — PREDNISONE 20 MG/1
TABLET ORAL
Qty: 11 TABLET | Refills: 0 | Status: SHIPPED | OUTPATIENT
Start: 2022-10-28

## 2022-10-28 NOTE — PROGRESS NOTES
Selina Mathew is a 67 y.o. male presenting for/with:    Chief Complaint   Patient presents with    Cough     Patient states symptoms started about 2 weeks ago . Erleen Giang still w/ cough and fever . .. temp last night was 100. .. no production w/ cough . . using Advil cold and flu otc       Visit Vitals  Pulse 71   Temp 97.1 °F (36.2 °C) (Temporal)   Resp 20   SpO2 99%     Pain Scale: 0 - No pain/10  Pain Location:     1. \"Have you been to the ER, urgent care clinic since your last visit? Hospitalized since your last visit? \" No    2. \"Have you seen or consulted any other health care providers outside of the [address] [place] [address] since your last visit? \" No     3. For patients aged 39-70: Has the patient had a colonoscopy / FIT/ Cologuard? Yes - Care Gap present. Most recent result on file      If the patient is female:    4. For patients aged 41-77: Has the patient had a mammogram within the past 2 years? NA - based on age or sex      11. For patients aged 21-65: Has the patient had a pap smear? NA - based on age or sex          Patient    Learning Assessment 10/7/2022   PRIMARY LEARNER Patient   PRIMARY LANGUAGE ENGLISH   LEARNER PREFERENCE PRIMARY DEMONSTRATION     -   ANSWERED BY patient   RELATIONSHIP SELF     Fall Risk Assessment, last 12 mths 10/7/2022   Able to walk? Yes   Fall in past 12 months? -   Do you feel unsteady? -   Are you worried about falling -   Is TUG test greater than 12 seconds? -   Is the gait abnormal? -   Number of falls in past 12 months -   Fall with injury?  -       3 most recent PHQ Screens 10/28/2022   PHQ Not Done -   Little interest or pleasure in doing things Not at all   Feeling down, depressed, irritable, or hopeless Not at all   Total Score PHQ 2 0   Trouble falling or staying asleep, or sleeping too much -   Feeling tired or having little energy -   Poor appetite, weight loss, or overeating -   Feeling bad about yourself - or that you are a failure or have let yourself or your family down -   Trouble concentrating on things such as school, work, reading, or watching TV -   Moving or speaking so slowly that other people could have noticed; or the opposite being so fidgety that others notice -   Thoughts of being better off dead, or hurting yourself in some way -   PHQ 9 Score -   How difficult have these problems made it for you to do your work, take care of your home and get along with others -     Abuse Screening Questionnaire 10/7/2022   Do you ever feel afraid of your partner? N   Are you in a relationship with someone who physically or mentally threatens you? N   Is it safe for you to go home?  Y       ADL Assessment 10/7/2022   Feeding yourself No Help Needed   Getting from bed to chair No Help Needed   Getting dressed No Help Needed   Bathing or showering No Help Needed   Walk across the room (includes cane/walker) No Help Needed   Using the telphone No Help Needed   Taking your medications No Help Needed   Preparing meals No Help Needed   Managing money (expenses/bills) No Help Needed   Moderately strenuous housework (laundry) No Help Needed   Shopping for personal items (toiletries/medicines) No Help Needed   Shopping for groceries No Help Needed   Driving No Help Needed   Climbing a flight of stairs No Help Needed   Getting to places beyond walking distances No Help Needed      Advance Care Planning 10/7/2022   Patient's Healthcare Decision Maker is: Legal Next of Kin   Confirm Advance Directive None

## 2022-10-28 NOTE — PROGRESS NOTES
Subjective:   Cough (Patient states symptoms started about 2 weeks ago . Sherice Mas still w/ cough and fever . .. temp last night was 100. .. no production w/ cough . . using Advil cold and flu otc)    Pt seen curbside 2nd pandemic    HPI:  Symptoms include cough, congestion, hoarseness x1 week, staying about the same. No f/c or weakness. Evaluation to date: none. Treatment to date: rest, fluids. Former Smoker  Still quit since last visit. Encouraged! PMH, SH, Medications/Allergies: reviewed, on chart. Current Outpatient Medications   Medication Sig    traMADoL (ULTRAM) 50 mg tablet Take 1 Tablet by mouth three (3) times daily as needed for Pain for up to 90 days. Max Daily Amount: 150 mg. Indications: low back pain    lisinopril-hydroCHLOROthiazide (PRINZIDE, ZESTORETIC) 20-12.5 mg per tablet TAKE 1 TABLET EVERY DAY (REPLACES PLAIN LISINOPRIL)    atorvastatin (LIPITOR) 20 mg tablet TAKE 1 TABLET DAILY FOR CHOLESTEROL AND HEART    triamcinolone acetonide (KENALOG) 0.1 % topical cream APPLY A THIN LAYER TO AFFECTED AREA TWICE DAILY    dilTIAZem ER (CARDIZEM CD) 180 mg capsule TAKE 1 CAPSULE EVERY DAY FOR PRESSURE AND PULSE    DULoxetine (CYMBALTA) 20 mg capsule Take 1 Capsule by mouth daily. Indications: arthritis and nerves    multivitamin (ONE A DAY) tablet Take 1 Tab by mouth daily. aspirin delayed-release 81 mg tablet Take 1 Tab by mouth daily. Indications: prevent stroke, heart attack     No current facility-administered medications for this visit.       No Known Allergies  ROS:  Constitutional: No fever, chills or abnormal weight loss  Respiratory: No cough, SOB   CV: No chest pain or Palpitations    Objective:     VS review:  Visit Vitals  Pulse 71   Temp 97.1 °F (36.2 °C) (Temporal)   Resp 20   SpO2 99%     Wt Readings from Last 3 Encounters:   10/07/22 152 lb 4 oz (69.1 kg)   05/27/22 161 lb (73 kg)   02/22/22 163 lb 3.2 oz (74 kg)     BP Readings from Last 3 Encounters:   10/07/22 120/70   05/27/22 (!) 142/64   02/22/22 120/80     Physical Examination: General appearance - alert, well appearing, and in no distress  Mental status - alert, oriented to person, place, and time  Eyes - pupils equal and reactive, extraocular eye movements intact  ENT - bilateral external ears and nose normal. Normal lips  Neck - supple, no significant adenopathy, no thyromegaly or mass  Chest - bibasilar crackles on auscultation, no wheezes, rales. symmetric air entry.   Heart - normal rate, regular rhythm, normal S1, S2, no murmurs, rubs, clicks or gallops  Extremities - peripheral pulses normal, no pedal edema, no clubbing or cyanosis    R Covid: NEG  R Flu NEG    A/p:  URI with cough, likely RSV given community outbreak  Tx with prednisone taper 40/20/10 3/3/3    F/U 3mo    Laura Matute MD

## 2022-10-28 NOTE — TELEPHONE ENCOUNTER
C/O fever, Fever 101.2 - mornings and evenings most recently last night. Cough with thick production  X2 weeks unchanged. No home tests completed. Taking Aleve cough and cold, denies OTC tylenol.   (+) N/V/D, Keeping fluids down. Unable to schedule patient in any local PCP office. Directed wife to either contact urgent care or use [facility] 24/[facility], instructions provided. S/O verbalized understanding. Will call back if issues arise. Will take PT to ER if SOB/ CP arises or S/S worsen.

## 2022-11-04 DIAGNOSIS — M47.816 LUMBAR SPONDYLOSIS: ICD-10-CM

## 2022-11-04 DIAGNOSIS — M48.061 SPINAL STENOSIS OF LUMBAR REGION WITHOUT NEUROGENIC CLAUDICATION: ICD-10-CM

## 2022-11-04 DIAGNOSIS — I10 ESSENTIAL HYPERTENSION: ICD-10-CM

## 2022-11-04 RX ORDER — DULOXETIN HYDROCHLORIDE 20 MG/1
CAPSULE, DELAYED RELEASE ORAL
Qty: 90 CAPSULE | Refills: 3 | Status: SHIPPED | OUTPATIENT
Start: 2022-11-04

## 2022-11-04 RX ORDER — DILTIAZEM HYDROCHLORIDE 180 MG/1
CAPSULE, COATED, EXTENDED RELEASE ORAL
Qty: 90 CAPSULE | Refills: 3 | Status: SHIPPED | OUTPATIENT
Start: 2022-11-04

## 2023-01-06 ENCOUNTER — OFFICE VISIT (OUTPATIENT)
Dept: FAMILY MEDICINE CLINIC | Age: 73
End: 2023-01-06
Payer: MEDICARE

## 2023-01-06 VITALS
HEART RATE: 79 BPM | OXYGEN SATURATION: 100 % | RESPIRATION RATE: 18 BRPM | TEMPERATURE: 97.1 F | BODY MASS INDEX: 23.04 KG/M2 | HEIGHT: 67 IN | WEIGHT: 146.8 LBS | DIASTOLIC BLOOD PRESSURE: 60 MMHG | SYSTOLIC BLOOD PRESSURE: 128 MMHG

## 2023-01-06 DIAGNOSIS — M54.50 CHRONIC MIDLINE LOW BACK PAIN WITHOUT SCIATICA: ICD-10-CM

## 2023-01-06 DIAGNOSIS — G89.29 CHRONIC MIDLINE LOW BACK PAIN WITHOUT SCIATICA: ICD-10-CM

## 2023-01-06 DIAGNOSIS — M51.36 DDD (DEGENERATIVE DISC DISEASE), LUMBAR: ICD-10-CM

## 2023-01-06 DIAGNOSIS — M47.816 LUMBAR SPONDYLOSIS: ICD-10-CM

## 2023-01-06 RX ORDER — TRAMADOL HYDROCHLORIDE 50 MG/1
50 TABLET ORAL
Qty: 90 TABLET | Refills: 0 | Status: SHIPPED | OUTPATIENT
Start: 2023-01-06 | End: 2023-04-06

## 2023-01-06 RX ORDER — DULOXETIN HYDROCHLORIDE 30 MG/1
CAPSULE, DELAYED RELEASE ORAL
Qty: 90 CAPSULE | Refills: 3 | Status: SHIPPED | OUTPATIENT
Start: 2023-01-06

## 2023-01-06 NOTE — PROGRESS NOTES
Thalia Gooden is a 67 y.o. male presenting for/with:    Chief Complaint   Patient presents with    Hypertension     3 month follow up       Visit Vitals  /60   Pulse 79   Temp 97.1 °F (36.2 °C) (Temporal)   Resp 18   Ht 5' 7\" (1.702 m)   Wt 146 lb 12.8 oz (66.6 kg)   SpO2 100%   BMI 22.99 kg/m²     Pain Scale: /10  Pain Location:     1. \"Have you been to the ER, urgent care clinic since your last visit? Hospitalized since your last visit? \" No    2. \"Have you seen or consulted any other health care providers outside of the [address] [place] [address] since your last visit? \" No     3. For patients aged 39-70: Has the patient had a colonoscopy / FIT/ Cologuard? Yes - no Care Gap present      If the patient is female:    4. For patients aged 41-77: Has the patient had a mammogram within the past 2 years? NA - based on age or sex      11. For patients aged 21-65: Has the patient had a pap smear? NA - based on age or sex      Symptom review:  Learning Assessment 10/7/2022   PRIMARY LEARNER Patient   PRIMARY LANGUAGE ENGLISH   LEARNER PREFERENCE PRIMARY DEMONSTRATION     -   ANSWERED BY patient   RELATIONSHIP SELF     Fall Risk Assessment, last 12 mths 1/6/2023   Able to walk? Yes   Fall in past 12 months? 0   Do you feel unsteady? 0   Are you worried about falling 0   Is TUG test greater than 12 seconds? -   Is the gait abnormal? -   Number of falls in past 12 months -   Fall with injury?  -       3 most recent PHQ Screens 1/6/2023   PHQ Not Done -   Little interest or pleasure in doing things Not at all   Feeling down, depressed, irritable, or hopeless Not at all   Total Score PHQ 2 0   Trouble falling or staying asleep, or sleeping too much -   Feeling tired or having little energy -   Poor appetite, weight loss, or overeating -   Feeling bad about yourself - or that you are a failure or have let yourself or your family down -   Trouble concentrating on things such as school, work, reading, or watching TV - Moving or speaking so slowly that other people could have noticed; or the opposite being so fidgety that others notice -   Thoughts of being better off dead, or hurting yourself in some way -   PHQ 9 Score -   How difficult have these problems made it for you to do your work, take care of your home and get along with others -     Abuse Screening Questionnaire 1/6/2023   Do you ever feel afraid of your partner? N   Are you in a relationship with someone who physically or mentally threatens you? N   Is it safe for you to go home?  Y       ADL Assessment 1/6/2023   Feeding yourself No Help Needed   Getting from bed to chair No Help Needed   Getting dressed No Help Needed   Bathing or showering No Help Needed   Walk across the room (includes cane/walker) No Help Needed   Using the telphone No Help Needed   Taking your medications No Help Needed   Preparing meals No Help Needed   Managing money (expenses/bills) No Help Needed   Moderately strenuous housework (laundry) No Help Needed   Shopping for personal items (toiletries/medicines) No Help Needed   Shopping for groceries No Help Needed   Driving No Help Needed   Climbing a flight of stairs No Help Needed   Getting to places beyond walking distances No Help Needed      Advance Care Planning 1/6/2023   Patient's Healthcare Decision Maker is: Named in scanned ACP document   Confirm Advance Directive Yes, on file

## 2023-01-06 NOTE — PROGRESS NOTES
Subjective:   Hypertension (3 month follow up)    HPI:  R upper jaw dental abscess  As been bothersome for past month, much more lately. Swollen upper jaw, upper cheek R side. Has a broken tooth, talked to dental, they rec abx first, then will see in [address]. DJD L-spine  Doing well for the most part since last visit on the tramadol 50 TID, but having more discomfort over past mo with hunting season, carrying heavy deer, carrying shotgun. Able to perform all of his ADL's without difficulty now. Doing well on tramadol, taking about 3/d now. Remains on cymbalta as adjunct for pain relief. Did good course of phys therapy in past, no help, not interested in more therapy at this time. UDS good 10/2022.  reviewed, in goal, so due today. Hypertension. Blood pressures good today. PT reports is taking meds regularly. Management at last visit included con't current tx. Current regimen: ACEI, calcium channel blocker, thiazide. Symptoms include no symptoms. Patient denies chest pain, palpitations, peripheral edema. Lab review:   Lab Results   Component Value Date/Time    Sodium 137 10/07/2022 11:43 AM    Potassium 4.1 10/07/2022 11:43 AM    Chloride 101 10/07/2022 11:43 AM    CO2 27 10/07/2022 11:43 AM    Anion gap 9 10/07/2022 11:43 AM    Glucose 101 (H) 10/07/2022 11:43 AM    BUN 16 10/07/2022 11:43 AM    Creatinine 1.05 10/07/2022 11:43 AM    BUN/Creatinine ratio 15 10/07/2022 11:43 AM    GFR est AA >60 02/22/2022 08:26 AM    GFR est non-AA >60 02/22/2022 08:26 AM    Calcium 9.4 10/07/2022 11:43 AM     Hyperlipidemia. On lipitor 20. Francis well. No myalgias, arthralgias, unusual weakness.   Lab Results   Component Value Date/Time    Cholesterol, total 143 10/07/2022 11:43 AM    HDL Cholesterol 37 10/07/2022 11:43 AM    LDL, calculated 74.6 10/07/2022 11:43 AM    VLDL, calculated 31.4 10/07/2022 11:43 AM    Triglyceride 157 (H) 10/07/2022 11:43 AM    CHOL/HDL Ratio 3.9 10/07/2022 11:43 AM     Lab Results   Component Value Date/Time    ALT (SGPT) 39 10/07/2022 11:43 AM    Alk. phosphatase 113 10/07/2022 11:43 AM    Bilirubin, total 0.4 10/07/2022 11:43 AM     Former Smoker  Still quit since last visit. Encouraged! PMH, SH, Medications/Allergies: reviewed, on chart. Current Outpatient Medications   Medication Sig    dilTIAZem ER (CARDIZEM CD) 180 mg capsule TAKE 1 CAPSULE EVERY DAY FOR PRESSURE AND PULSE    DULoxetine (CYMBALTA) 20 mg capsule TAKE 1 CAPSULE DAILY FOR ARTHRITIS AND NERVES (DOSE DECREASE)    predniSONE (DELTASONE) 20 mg tablet 2 po qdx3 d then 1 po qdx3 d then 1/2 po qdx3 d for cough, congestion. lisinopril-hydroCHLOROthiazide (PRINZIDE, ZESTORETIC) 20-12.5 mg per tablet TAKE 1 TABLET EVERY DAY (REPLACES PLAIN LISINOPRIL)    atorvastatin (LIPITOR) 20 mg tablet TAKE 1 TABLET DAILY FOR CHOLESTEROL AND HEART    triamcinolone acetonide (KENALOG) 0.1 % topical cream APPLY A THIN LAYER TO AFFECTED AREA TWICE DAILY    multivitamin (ONE A DAY) tablet Take 1 Tab by mouth daily. aspirin delayed-release 81 mg tablet Take 1 Tab by mouth daily. Indications: prevent stroke, heart attack    traMADoL (ULTRAM) 50 mg tablet Take 1 Tablet by mouth three (3) times daily as needed for Pain for up to 90 days. Max Daily Amount: 150 mg. Indications: low back pain     No current facility-administered medications for this visit.       No Known Allergies  ROS:  Constitutional: No fever, chills or abnormal weight loss  Respiratory: No cough, SOB   CV: No chest pain or Palpitations    VS review:  Visit Vitals  /60   Pulse 79   Temp 97.1 °F (36.2 °C) (Temporal)   Resp 18   Ht 5' 7\" (1.702 m)   Wt 146 lb 12.8 oz (66.6 kg)   SpO2 100%   BMI 22.99 kg/m²     -6#  Wt Readings from Last 3 Encounters:   01/06/23 146 lb 12.8 oz (66.6 kg)   10/07/22 152 lb 4 oz (69.1 kg)   05/27/22 161 lb (73 kg)     BP Readings from Last 3 Encounters:   01/06/23 128/60   10/07/22 120/70 05/27/22 (!) 142/64       Objective:     Physical Examination: General appearance - alert, well appearing, and in no distress  Mental status - alert, oriented to person, place, and time  Eyes - pupils equal and reactive, extraocular eye movements intact  ENT - bilateral external ears and nose normal. Normal lips  Neck - supple, no significant adenopathy, no thyromegaly or mass  Chest - bibasilar crackles on auscultation, no wheezes, rales. symmetric air entry. Heart - normal rate, regular rhythm, normal S1, S2, no murmurs, rubs, clicks or gallops  Extremities - peripheral pulses normal, no pedal edema, no clubbing or cyanosis    A/p:  DJD, hip bursitis  Work on activity mod. consider toradol 30mg IM if needed. UDS good 10/2022, due 4/2023. Con't tramadol 50mg TID x1mo, can renew on request if needed, but if worsening control/ more tolerance, consider change to norco 5's BID. Try boost cymbalta back to 30mg/d, thinks can stanton better now that he's used to it. HTN  In goal, but a little high. Check BP's at home. Con't current regimen. con't lisinopril HCT 20/12.5mg daily. Recheck labs. HLD and ASCVD  High risk, 32.7% 10 yr ASCVD risk. Doing well on lipitor and ASA. Labs in goal last check, Recheck ~8/2021. Chart review shows aortic atherosclerosis on an old spine series. con't to work on BP control, lipid mgmt. Smoking  Still quit. Encouraged to stay quit. Hyperglycemia  Lab Results   Component Value Date/Time    Hemoglobin A1c 5.8 (H) 02/12/2019 08:40 AM   Not too bad. Plan monitor occasionally. Con't to work on [employer]. Dental abscess R upper jaw, lateral incisor  Saw [facility], had good tx. Colon ca screening: in goal  COVIDvax recommended. Pt will consider.     F/U [email or web address]    Estrellita Crowley MD

## 2023-01-18 ENCOUNTER — CLINICAL SUPPORT (OUTPATIENT)
Dept: FAMILY MEDICINE CLINIC | Age: 73
End: 2023-01-18
Payer: MEDICARE

## 2023-01-18 DIAGNOSIS — M70.71 BURSITIS OF RIGHT HIP, UNSPECIFIED BURSA: Primary | ICD-10-CM

## 2023-01-18 PROCEDURE — 96372 THER/PROPH/DIAG INJ SC/IM: CPT | Performed by: FAMILY MEDICINE

## 2023-01-18 RX ORDER — KETOROLAC TROMETHAMINE 30 MG/ML
30 INJECTION, SOLUTION INTRAMUSCULAR; INTRAVENOUS
Status: COMPLETED | OUTPATIENT
Start: 2023-01-18 | End: 2023-01-18

## 2023-01-18 RX ADMIN — KETOROLAC TROMETHAMINE 30 MG: 30 INJECTION, SOLUTION INTRAMUSCULAR; INTRAVENOUS at 13:42

## 2023-01-18 NOTE — PROGRESS NOTES
Per Dr. Mary Duong note from 1/6/23 gave Toradol 30 mg right hip. Tolerated well. No adverse reaction at this time.

## 2023-03-27 ENCOUNTER — OFFICE VISIT (OUTPATIENT)
Dept: FAMILY MEDICINE CLINIC | Age: 73
End: 2023-03-27
Payer: MEDICARE

## 2023-03-27 VITALS
DIASTOLIC BLOOD PRESSURE: 76 MMHG | RESPIRATION RATE: 18 BRPM | WEIGHT: 157.8 LBS | OXYGEN SATURATION: 100 % | HEIGHT: 67 IN | BODY MASS INDEX: 24.77 KG/M2 | HEART RATE: 78 BPM | TEMPERATURE: 97.1 F | SYSTOLIC BLOOD PRESSURE: 128 MMHG

## 2023-03-27 DIAGNOSIS — M54.50 CHRONIC MIDLINE LOW BACK PAIN WITHOUT SCIATICA: ICD-10-CM

## 2023-03-27 DIAGNOSIS — M47.816 LUMBAR SPONDYLOSIS: ICD-10-CM

## 2023-03-27 DIAGNOSIS — M70.62 TROCHANTERIC BURSITIS OF LEFT HIP: Primary | ICD-10-CM

## 2023-03-27 DIAGNOSIS — M51.36 DDD (DEGENERATIVE DISC DISEASE), LUMBAR: ICD-10-CM

## 2023-03-27 DIAGNOSIS — I25.10 ASCVD (ARTERIOSCLEROTIC CARDIOVASCULAR DISEASE): ICD-10-CM

## 2023-03-27 DIAGNOSIS — I10 PRIMARY HYPERTENSION: ICD-10-CM

## 2023-03-27 DIAGNOSIS — G89.29 CHRONIC MIDLINE LOW BACK PAIN WITHOUT SCIATICA: ICD-10-CM

## 2023-03-27 DIAGNOSIS — E78.00 PURE HYPERCHOLESTEROLEMIA: ICD-10-CM

## 2023-03-27 DIAGNOSIS — Z51.81 THERAPEUTIC DRUG MONITORING: ICD-10-CM

## 2023-03-27 PROCEDURE — 1123F ACP DISCUSS/DSCN MKR DOCD: CPT | Performed by: FAMILY MEDICINE

## 2023-03-27 PROCEDURE — 99214 OFFICE O/P EST MOD 30 MIN: CPT | Performed by: FAMILY MEDICINE

## 2023-03-27 PROCEDURE — G8427 DOCREV CUR MEDS BY ELIG CLIN: HCPCS | Performed by: FAMILY MEDICINE

## 2023-03-27 PROCEDURE — 3017F COLORECTAL CA SCREEN DOC REV: CPT | Performed by: FAMILY MEDICINE

## 2023-03-27 PROCEDURE — G8420 CALC BMI NORM PARAMETERS: HCPCS | Performed by: FAMILY MEDICINE

## 2023-03-27 PROCEDURE — 3074F SYST BP LT 130 MM HG: CPT | Performed by: FAMILY MEDICINE

## 2023-03-27 PROCEDURE — 96372 THER/PROPH/DIAG INJ SC/IM: CPT | Performed by: FAMILY MEDICINE

## 2023-03-27 PROCEDURE — G8510 SCR DEP NEG, NO PLAN REQD: HCPCS | Performed by: FAMILY MEDICINE

## 2023-03-27 PROCEDURE — 1101F PT FALLS ASSESS-DOCD LE1/YR: CPT | Performed by: FAMILY MEDICINE

## 2023-03-27 PROCEDURE — G8536 NO DOC ELDER MAL SCRN: HCPCS | Performed by: FAMILY MEDICINE

## 2023-03-27 PROCEDURE — 3078F DIAST BP <80 MM HG: CPT | Performed by: FAMILY MEDICINE

## 2023-03-27 RX ORDER — KETOROLAC TROMETHAMINE 30 MG/ML
30 INJECTION, SOLUTION INTRAMUSCULAR; INTRAVENOUS
Status: COMPLETED | OUTPATIENT
Start: 2023-03-27 | End: 2023-03-27

## 2023-03-27 RX ORDER — TRAMADOL HYDROCHLORIDE 50 MG/1
50 TABLET ORAL
Qty: 90 TABLET | Refills: 0 | Status: SHIPPED | OUTPATIENT
Start: 2023-03-27 | End: 2023-06-25

## 2023-03-27 RX ADMIN — KETOROLAC TROMETHAMINE 30 MG: 30 INJECTION, SOLUTION INTRAMUSCULAR; INTRAVENOUS at 08:51

## 2023-03-27 NOTE — PROGRESS NOTES
Edna De La O is a 67 y.o. male presenting for/with:    Chief Complaint   Patient presents with    Hypertension     Follow up    Back Pain       Visit Vitals  /76   Pulse 78   Temp 97.1 °F (36.2 °C) (Temporal)   Resp 18   Ht 5' 7\" (1.702 m)   Wt 157 lb 12.8 oz (71.6 kg)   SpO2 100%   BMI 24.71 kg/m²     Pain Scale: /10  Pain Location:     1. \"Have you been to the ER, urgent care clinic since your last visit? Hospitalized since your last visit? \" No    2. \"Have you seen or consulted any other health care providers outside of the [address] [place] [address] since your last visit? \" No     3. For patients aged 39-70: Has the patient had a colonoscopy / FIT/ Cologuard? No      If the patient is female:    4. For patients aged 41-77: Has the patient had a mammogram within the past 2 years? NA - based on age or sex      11. For patients aged 21-65: Has the patient had a pap smear? NA - based on age or sex      Symptom review:  Learning Assessment 10/7/2022   PRIMARY LEARNER Patient   PRIMARY LANGUAGE ENGLISH   LEARNER PREFERENCE PRIMARY DEMONSTRATION     -   ANSWERED BY patient   RELATIONSHIP SELF     Fall Risk Assessment, last 12 mths 3/27/2023   Able to walk? Yes   Fall in past 12 months? 0   Do you feel unsteady? 0   Are you worried about falling 0   Is TUG test greater than 12 seconds? -   Is the gait abnormal? -   Number of falls in past 12 months -   Fall with injury?  -       3 most recent PHQ Screens 3/27/2023   PHQ Not Done -   Little interest or pleasure in doing things Not at all   Feeling down, depressed, irritable, or hopeless Not at all   Total Score PHQ 2 0   Trouble falling or staying asleep, or sleeping too much -   Feeling tired or having little energy -   Poor appetite, weight loss, or overeating -   Feeling bad about yourself - or that you are a failure or have let yourself or your family down -   Trouble concentrating on things such as school, work, reading, or watching TV -   Moving or speaking so slowly that other people could have noticed; or the opposite being so fidgety that others notice -   Thoughts of being better off dead, or hurting yourself in some way -   PHQ 9 Score -   How difficult have these problems made it for you to do your work, take care of your home and get along with others -     Abuse Screening Questionnaire 3/27/2023   Do you ever feel afraid of your partner? N   Are you in a relationship with someone who physically or mentally threatens you? N   Is it safe for you to go home?  Y       ADL Assessment 3/27/2023   Feeding yourself No Help Needed   Getting from bed to chair No Help Needed   Getting dressed No Help Needed   Bathing or showering No Help Needed   Walk across the room (includes cane/walker) No Help Needed   Using the telphone No Help Needed   Taking your medications No Help Needed   Preparing meals No Help Needed   Managing money (expenses/bills) No Help Needed   Moderately strenuous housework (laundry) No Help Needed   Shopping for personal items (toiletries/medicines) No Help Needed   Shopping for groceries No Help Needed   Driving No Help Needed   Climbing a flight of stairs No Help Needed   Getting to places beyond walking distances No Help Needed      Advance Care Planning 3/27/2023   Patient's Healthcare Decision Maker is: Named in scanned ACP document   Confirm Advance Directive Yes, on file

## 2023-03-27 NOTE — PROGRESS NOTES
Subjective:   Hypertension (Follow up) and Back Pain    HPI:  R upper jaw dental abscess  As been bothersome for past month, much more lately. Swollen upper jaw, upper cheek R side. Has a broken tooth, talked to dental, they rec abx first, then will see in [address]. DJD L-spine  Doing well for the most part since last visit on the tramadol 50 TID, but having more discomfort over past mo with hunting season, carrying heavy deer, carrying shotgun. Able to perform all of his ADL's without difficulty now. Doing well on tramadol, taking about 3/d now. Remains on cymbalta as adjunct for pain relief. Did good course of phys therapy in past, no help, not interested in more therapy at this time. UDS good 10/2022.  reviewed, in goal, so due today. Hypertension. Blood pressures good today. PT reports is taking meds regularly. Management at last visit included con't current tx. Current regimen: ACEI, calcium channel blocker, thiazide. Symptoms include no symptoms. Patient denies chest pain, palpitations, peripheral edema. Lab review:   Lab Results   Component Value Date/Time    Sodium 137 10/07/2022 11:43 AM    Potassium 4.1 10/07/2022 11:43 AM    Chloride 101 10/07/2022 11:43 AM    CO2 27 10/07/2022 11:43 AM    Anion gap 9 10/07/2022 11:43 AM    Glucose 101 (H) 10/07/2022 11:43 AM    BUN 16 10/07/2022 11:43 AM    Creatinine 1.05 10/07/2022 11:43 AM    BUN/Creatinine ratio 15 10/07/2022 11:43 AM    GFR est AA >60 02/22/2022 08:26 AM    GFR est non-AA >60 02/22/2022 08:26 AM    Calcium 9.4 10/07/2022 11:43 AM     Hyperlipidemia. On lipitor 20. Francis well. No myalgias, arthralgias, unusual weakness.   Lab Results   Component Value Date/Time    Cholesterol, total 143 10/07/2022 11:43 AM    HDL Cholesterol 37 10/07/2022 11:43 AM    LDL, calculated 74.6 10/07/2022 11:43 AM    VLDL, calculated 31.4 10/07/2022 11:43 AM    Triglyceride 157 (H) 10/07/2022 11:43 AM    CHOL/HDL Ratio 3.9 10/07/2022 11:43 AM     Lab Results Component Value Date/Time    ALT (SGPT) 39 10/07/2022 11:43 AM    Alk. phosphatase 113 10/07/2022 11:43 AM    Bilirubin, total 0.4 10/07/2022 11:43 AM     Former Smoker  Still quit since last visit. Encouraged! PMH, SH, Medications/Allergies: reviewed, on chart. Current Outpatient Medications   Medication Sig    traMADoL (ULTRAM) 50 mg tablet Take 1 Tablet by mouth three (3) times daily as needed for Pain for up to 90 days. Max Daily Amount: 150 mg. Indications: low back pain    DULoxetine (CYMBALTA) 30 mg capsule TAKE 1 CAPSULE EVERY DAY FOR NERVE PAIN  Indications: joint pain and nerve pain    dilTIAZem ER (CARDIZEM CD) 180 mg capsule TAKE 1 CAPSULE EVERY DAY FOR PRESSURE AND PULSE    predniSONE (DELTASONE) 20 mg tablet 2 po qdx3 d then 1 po qdx3 d then 1/2 po qdx3 d for cough, congestion. lisinopril-hydroCHLOROthiazide (PRINZIDE, ZESTORETIC) 20-12.5 mg per tablet TAKE 1 TABLET EVERY DAY (REPLACES PLAIN LISINOPRIL)    atorvastatin (LIPITOR) 20 mg tablet TAKE 1 TABLET DAILY FOR CHOLESTEROL AND HEART    triamcinolone acetonide (KENALOG) 0.1 % topical cream APPLY A THIN LAYER TO AFFECTED AREA TWICE DAILY    multivitamin (ONE A DAY) tablet Take 1 Tab by mouth daily. aspirin delayed-release 81 mg tablet Take 1 Tab by mouth daily. Indications: prevent stroke, heart attack     No current facility-administered medications for this visit.       No Known Allergies  ROS:  Constitutional: No fever, chills or abnormal weight loss  Respiratory: No cough, SOB   CV: No chest pain or Palpitations    VS review:  Visit Vitals  /76   Pulse 78   Temp 97.1 °F (36.2 °C) (Temporal)   Resp 18   Ht 5' 7\" (1.702 m)   Wt 157 lb 12.8 oz (71.6 kg)   SpO2 100%   BMI 24.71 kg/m²     +11#  Wt Readings from Last 3 Encounters:   03/27/23 157 lb 12.8 oz (71.6 kg)   01/06/23 146 lb 12.8 oz (66.6 kg)   10/07/22 152 lb 4 oz (69.1 kg)     BP Readings from Last 3 Encounters:   03/27/23 128/76   01/06/23 128/60   10/07/22 120/70 Objective:     Physical Examination: General appearance - alert, well appearing, and in no distress  Mental status - alert, oriented to person, place, and time  Eyes - pupils equal and reactive, extraocular eye movements intact  ENT - bilateral external ears and nose normal. Normal lips  Neck - supple, no significant adenopathy, no thyromegaly or mass  Chest - bibasilar crackles on auscultation, no wheezes, rales. symmetric air entry. Heart - normal rate, regular rhythm, normal S1, S2, no murmurs, rubs, clicks or gallops  Extremities - peripheral pulses normal, no pedal edema, no clubbing or cyanosis    A/p:  DJD, hip bursitis  R hip doing better, now L hip is worsening. Would like rpt toradol 30mg IM, did well with that in past, no GI SE's. UDS good 10/2022, due 4/2023. Con't tramadol 50mg TID x1mo, can renew on request if needed, but if worsening control/ more tolerance, consider change to norco 5's BID. Try boost cymbalta back to 30mg/d, thinks can stanton better now that he's used to it. HTN  In goal, but a little high. Check BP's at home. Con't current regimen. con't lisinopril HCT 20/12.5mg daily. Recheck labs. HLD and ASCVD  High risk, 32.7% 10 yr ASCVD risk. Doing well on lipitor and ASA. Labs in goal last check, Recheck planned fall 2023. Chart review shows aortic atherosclerosis on an old spine series. con't to work on BP control, lipid mgmt. Smoking  Still quit. Encouraged to stay quit. Hyperglycemia  Lab Results   Component Value Date/Time    Hemoglobin A1c 5.8 (H) 02/12/2019 08:40 AM   Not too bad. Plan monitor occasionally. Con't to work on [employer]. Dental abscess R upper jaw, lateral incisor  Saw [facility], had good tx. Colon ca screening: in goal  COVIDvax recommended. Pt will consider.     F/U danette Mortensen MD

## 2023-03-28 LAB
ANION GAP SERPL CALC-SCNC: 4 MMOL/L (ref 5–15)
BUN SERPL-MCNC: 18 MG/DL (ref 6–20)
BUN/CREAT SERPL: 21 (ref 12–20)
CALCIUM SERPL-MCNC: 9.2 MG/DL (ref 8.5–10.1)
CHLORIDE SERPL-SCNC: 106 MMOL/L (ref 97–108)
CO2 SERPL-SCNC: 29 MMOL/L (ref 21–32)
CREAT SERPL-MCNC: 0.85 MG/DL (ref 0.7–1.3)
GLUCOSE SERPL-MCNC: 113 MG/DL (ref 65–100)
POTASSIUM SERPL-SCNC: 3.6 MMOL/L (ref 3.5–5.1)
SODIUM SERPL-SCNC: 139 MMOL/L (ref 136–145)

## 2023-03-29 LAB
AMPHETAMINES UR QL SCN: NEGATIVE NG/ML
BARBITURATES UR QL SCN: NEGATIVE NG/ML
BENZODIAZ UR QL: NEGATIVE NG/ML
BZE UR QL: NEGATIVE NG/ML
OPIATES UR QL: NEGATIVE NG/ML
PCP UR QL: NEGATIVE NG/ML

## 2023-04-03 LAB
AMPHETAMINES UR QL SCN: NEGATIVE NG/ML
BARBITURATES UR QL SCN: NEGATIVE NG/ML
BENZODIAZ UR QL: NEGATIVE NG/ML
BZE UR QL: NEGATIVE NG/ML
CANNABINOIDS UR QL CFM: POSITIVE
CANNABINOIDS UR QL SCN: NORMAL NG/ML
OPIATES UR QL: NEGATIVE NG/ML
PCP UR QL: NEGATIVE NG/ML
THC UR CFM-MCNC: 25 NG/ML

## 2023-04-26 DIAGNOSIS — Z51.81 THERAPEUTIC DRUG MONITORING: Primary | ICD-10-CM

## 2023-04-27 ENCOUNTER — APPOINTMENT (OUTPATIENT)
Dept: FAMILY MEDICINE CLINIC | Age: 73
End: 2023-04-27

## 2023-04-27 ENCOUNTER — TELEPHONE (OUTPATIENT)
Dept: FAMILY MEDICINE CLINIC | Age: 73
End: 2023-04-27

## 2023-04-27 DIAGNOSIS — Z51.81 THERAPEUTIC DRUG MONITORING: ICD-10-CM

## 2023-04-27 RX ORDER — ASPIRIN 81 MG/1
81 TABLET ORAL DAILY
COMMUNITY
Start: 2019-05-14

## 2023-04-27 RX ORDER — DULOXETIN HYDROCHLORIDE 30 MG/1
CAPSULE, DELAYED RELEASE ORAL
COMMUNITY
Start: 2023-01-06

## 2023-04-27 RX ORDER — TRAMADOL HYDROCHLORIDE 50 MG/1
50 TABLET ORAL 3 TIMES DAILY PRN
COMMUNITY
Start: 2023-03-27 | End: 2023-06-25

## 2023-04-27 RX ORDER — TRIAMCINOLONE ACETONIDE 1 MG/G
CREAM TOPICAL
COMMUNITY
Start: 2022-06-21

## 2023-04-27 RX ORDER — ATORVASTATIN CALCIUM 20 MG/1
TABLET, FILM COATED ORAL
COMMUNITY
Start: 2022-07-11

## 2023-04-27 RX ORDER — LISINOPRIL AND HYDROCHLOROTHIAZIDE 20; 12.5 MG/1; MG/1
TABLET ORAL
COMMUNITY
Start: 2022-07-11

## 2023-04-27 RX ORDER — DILTIAZEM HYDROCHLORIDE 180 MG/1
CAPSULE, EXTENDED RELEASE ORAL
COMMUNITY
Start: 2022-11-04

## 2023-04-27 NOTE — TELEPHONE ENCOUNTER
PCP did order follow up Urine Tox screen. Please reach out to patient to scheduled for labs today or tomorrow.

## 2023-05-04 DIAGNOSIS — M54.50 CHRONIC MIDLINE LOW BACK PAIN WITHOUT SCIATICA: ICD-10-CM

## 2023-05-04 DIAGNOSIS — M51.36 DDD (DEGENERATIVE DISC DISEASE), LUMBAR: ICD-10-CM

## 2023-05-04 DIAGNOSIS — M47.816 LUMBAR SPONDYLOSIS: ICD-10-CM

## 2023-05-04 DIAGNOSIS — G89.29 CHRONIC MIDLINE LOW BACK PAIN WITHOUT SCIATICA: ICD-10-CM

## 2023-05-05 RX ORDER — TRAMADOL HYDROCHLORIDE 50 MG/1
50 TABLET ORAL
Qty: 90 TABLET | Refills: 2 | Status: SHIPPED | OUTPATIENT
Start: 2023-05-05 | End: 2023-08-03

## 2023-05-26 ENCOUNTER — OFFICE VISIT (OUTPATIENT)
Age: 73
End: 2023-05-26
Payer: MEDICARE

## 2023-05-26 VITALS
BODY MASS INDEX: 24.86 KG/M2 | HEART RATE: 84 BPM | HEIGHT: 67 IN | WEIGHT: 158.4 LBS | TEMPERATURE: 97.8 F | DIASTOLIC BLOOD PRESSURE: 72 MMHG | RESPIRATION RATE: 18 BRPM | SYSTOLIC BLOOD PRESSURE: 148 MMHG | OXYGEN SATURATION: 99 %

## 2023-05-26 DIAGNOSIS — M70.61 TROCHANTERIC BURSITIS OF RIGHT HIP: Primary | ICD-10-CM

## 2023-05-26 PROCEDURE — 20610 DRAIN/INJ JOINT/BURSA W/O US: CPT | Performed by: FAMILY MEDICINE

## 2023-05-26 RX ORDER — LIDOCAINE HYDROCHLORIDE 20 MG/ML
5 INJECTION, SOLUTION EPIDURAL; INFILTRATION; INTRACAUDAL; PERINEURAL ONCE
Status: COMPLETED | OUTPATIENT
Start: 2023-05-26 | End: 2023-05-26

## 2023-05-26 RX ORDER — TRIAMCINOLONE ACETONIDE 40 MG/ML
20 INJECTION, SUSPENSION INTRA-ARTICULAR; INTRAMUSCULAR ONCE
Status: COMPLETED | OUTPATIENT
Start: 2023-05-26 | End: 2023-05-26

## 2023-05-26 RX ADMIN — LIDOCAINE HYDROCHLORIDE 5 ML: 20 INJECTION, SOLUTION EPIDURAL; INFILTRATION; INTRACAUDAL; PERINEURAL at 13:27

## 2023-05-26 RX ADMIN — TRIAMCINOLONE ACETONIDE 20 MG: 40 INJECTION, SUSPENSION INTRA-ARTICULAR; INTRAMUSCULAR at 13:28

## 2023-05-26 SDOH — ECONOMIC STABILITY: INCOME INSECURITY: HOW HARD IS IT FOR YOU TO PAY FOR THE VERY BASICS LIKE FOOD, HOUSING, MEDICAL CARE, AND HEATING?: NOT VERY HARD

## 2023-05-26 SDOH — ECONOMIC STABILITY: FOOD INSECURITY: WITHIN THE PAST 12 MONTHS, THE FOOD YOU BOUGHT JUST DIDN'T LAST AND YOU DIDN'T HAVE MONEY TO GET MORE.: NEVER TRUE

## 2023-05-26 SDOH — ECONOMIC STABILITY: HOUSING INSECURITY
IN THE LAST 12 MONTHS, WAS THERE A TIME WHEN YOU DID NOT HAVE A STEADY PLACE TO SLEEP OR SLEPT IN A SHELTER (INCLUDING NOW)?: NO

## 2023-05-26 SDOH — ECONOMIC STABILITY: FOOD INSECURITY: WITHIN THE PAST 12 MONTHS, YOU WORRIED THAT YOUR FOOD WOULD RUN OUT BEFORE YOU GOT MONEY TO BUY MORE.: NEVER TRUE

## 2023-05-26 ASSESSMENT — PATIENT HEALTH QUESTIONNAIRE - PHQ9
SUM OF ALL RESPONSES TO PHQ QUESTIONS 1-9: 0
2. FEELING DOWN, DEPRESSED OR HOPELESS: 0
SUM OF ALL RESPONSES TO PHQ QUESTIONS 1-9: 0
1. LITTLE INTEREST OR PLEASURE IN DOING THINGS: 0
SUM OF ALL RESPONSES TO PHQ9 QUESTIONS 1 & 2: 0
SUM OF ALL RESPONSES TO PHQ QUESTIONS 1-9: 0
SUM OF ALL RESPONSES TO PHQ QUESTIONS 1-9: 0

## 2023-06-26 SDOH — HEALTH STABILITY: PHYSICAL HEALTH: ON AVERAGE, HOW MANY DAYS PER WEEK DO YOU ENGAGE IN MODERATE TO STRENUOUS EXERCISE (LIKE A BRISK WALK)?: 5 DAYS

## 2023-06-26 ASSESSMENT — PATIENT HEALTH QUESTIONNAIRE - PHQ9
1. LITTLE INTEREST OR PLEASURE IN DOING THINGS: 0
SUM OF ALL RESPONSES TO PHQ QUESTIONS 1-9: 0
2. FEELING DOWN, DEPRESSED OR HOPELESS: 0
SUM OF ALL RESPONSES TO PHQ QUESTIONS 1-9: 0
SUM OF ALL RESPONSES TO PHQ QUESTIONS 1-9: 0
SUM OF ALL RESPONSES TO PHQ9 QUESTIONS 1 & 2: 0
SUM OF ALL RESPONSES TO PHQ QUESTIONS 1-9: 0

## 2023-06-26 ASSESSMENT — LIFESTYLE VARIABLES
HOW MANY STANDARD DRINKS CONTAINING ALCOHOL DO YOU HAVE ON A TYPICAL DAY: PATIENT DOES NOT DRINK
HOW OFTEN DO YOU HAVE SIX OR MORE DRINKS ON ONE OCCASION: 1
HOW MANY STANDARD DRINKS CONTAINING ALCOHOL DO YOU HAVE ON A TYPICAL DAY: 0
HOW OFTEN DO YOU HAVE A DRINK CONTAINING ALCOHOL: NEVER
HOW OFTEN DO YOU HAVE A DRINK CONTAINING ALCOHOL: 1

## 2023-06-27 ENCOUNTER — OFFICE VISIT (OUTPATIENT)
Age: 73
End: 2023-06-27
Payer: MEDICARE

## 2023-06-27 VITALS
SYSTOLIC BLOOD PRESSURE: 108 MMHG | HEIGHT: 67 IN | BODY MASS INDEX: 24.67 KG/M2 | HEART RATE: 84 BPM | OXYGEN SATURATION: 97 % | WEIGHT: 157.2 LBS | RESPIRATION RATE: 18 BRPM | DIASTOLIC BLOOD PRESSURE: 58 MMHG

## 2023-06-27 DIAGNOSIS — Z12.11 COLON CANCER SCREENING: ICD-10-CM

## 2023-06-27 DIAGNOSIS — Z00.00 MEDICARE ANNUAL WELLNESS VISIT, SUBSEQUENT: Primary | ICD-10-CM

## 2023-06-27 PROCEDURE — 3074F SYST BP LT 130 MM HG: CPT | Performed by: FAMILY MEDICINE

## 2023-06-27 PROCEDURE — G0439 PPPS, SUBSEQ VISIT: HCPCS | Performed by: FAMILY MEDICINE

## 2023-06-27 PROCEDURE — 3078F DIAST BP <80 MM HG: CPT | Performed by: FAMILY MEDICINE

## 2023-06-27 PROCEDURE — 1123F ACP DISCUSS/DSCN MKR DOCD: CPT | Performed by: FAMILY MEDICINE

## 2023-06-27 PROCEDURE — 3017F COLORECTAL CA SCREEN DOC REV: CPT | Performed by: FAMILY MEDICINE

## 2023-06-27 RX ORDER — TRAMADOL HYDROCHLORIDE 50 MG/1
50 TABLET ORAL EVERY 8 HOURS PRN
COMMUNITY

## 2023-06-27 SDOH — ECONOMIC STABILITY: FOOD INSECURITY: WITHIN THE PAST 12 MONTHS, YOU WORRIED THAT YOUR FOOD WOULD RUN OUT BEFORE YOU GOT MONEY TO BUY MORE.: NEVER TRUE

## 2023-06-27 SDOH — ECONOMIC STABILITY: FOOD INSECURITY: WITHIN THE PAST 12 MONTHS, THE FOOD YOU BOUGHT JUST DIDN'T LAST AND YOU DIDN'T HAVE MONEY TO GET MORE.: NEVER TRUE

## 2023-06-27 SDOH — ECONOMIC STABILITY: INCOME INSECURITY: HOW HARD IS IT FOR YOU TO PAY FOR THE VERY BASICS LIKE FOOD, HOUSING, MEDICAL CARE, AND HEATING?: NOT HARD AT ALL

## 2023-07-04 LAB — HEMOCCULT STL QL IA: NEGATIVE

## 2023-08-04 RX ORDER — TRIAMCINOLONE ACETONIDE 1 MG/G
CREAM TOPICAL
Qty: 60 G | Refills: 4 | Status: SHIPPED | OUTPATIENT
Start: 2023-08-04

## 2023-08-14 RX ORDER — ATORVASTATIN CALCIUM 20 MG/1
TABLET, FILM COATED ORAL
Qty: 90 TABLET | Refills: 3 | Status: SHIPPED | OUTPATIENT
Start: 2023-08-14

## 2023-08-14 RX ORDER — LISINOPRIL AND HYDROCHLOROTHIAZIDE 20; 12.5 MG/1; MG/1
TABLET ORAL
Qty: 90 TABLET | Refills: 3 | Status: SHIPPED | OUTPATIENT
Start: 2023-08-14

## 2023-08-23 ENCOUNTER — OFFICE VISIT (OUTPATIENT)
Age: 73
End: 2023-08-23
Payer: MEDICARE

## 2023-08-23 VITALS
OXYGEN SATURATION: 99 % | SYSTOLIC BLOOD PRESSURE: 121 MMHG | HEART RATE: 72 BPM | HEIGHT: 67 IN | BODY MASS INDEX: 24.96 KG/M2 | WEIGHT: 159 LBS | RESPIRATION RATE: 18 BRPM | TEMPERATURE: 97.5 F | DIASTOLIC BLOOD PRESSURE: 57 MMHG

## 2023-08-23 DIAGNOSIS — M51.36 DDD (DEGENERATIVE DISC DISEASE), LUMBAR: ICD-10-CM

## 2023-08-23 DIAGNOSIS — I10 PRIMARY HYPERTENSION: ICD-10-CM

## 2023-08-23 DIAGNOSIS — M47.816 LUMBAR SPONDYLOSIS: ICD-10-CM

## 2023-08-23 DIAGNOSIS — I25.10 ASCVD (ARTERIOSCLEROTIC CARDIOVASCULAR DISEASE): ICD-10-CM

## 2023-08-23 DIAGNOSIS — M70.62 TROCHANTERIC BURSITIS, LEFT HIP: Primary | ICD-10-CM

## 2023-08-23 PROCEDURE — 1036F TOBACCO NON-USER: CPT | Performed by: FAMILY MEDICINE

## 2023-08-23 PROCEDURE — 3078F DIAST BP <80 MM HG: CPT | Performed by: FAMILY MEDICINE

## 2023-08-23 PROCEDURE — 20610 DRAIN/INJ JOINT/BURSA W/O US: CPT | Performed by: FAMILY MEDICINE

## 2023-08-23 PROCEDURE — G8427 DOCREV CUR MEDS BY ELIG CLIN: HCPCS | Performed by: FAMILY MEDICINE

## 2023-08-23 PROCEDURE — 3017F COLORECTAL CA SCREEN DOC REV: CPT | Performed by: FAMILY MEDICINE

## 2023-08-23 PROCEDURE — 3074F SYST BP LT 130 MM HG: CPT | Performed by: FAMILY MEDICINE

## 2023-08-23 PROCEDURE — 1123F ACP DISCUSS/DSCN MKR DOCD: CPT | Performed by: FAMILY MEDICINE

## 2023-08-23 PROCEDURE — G8420 CALC BMI NORM PARAMETERS: HCPCS | Performed by: FAMILY MEDICINE

## 2023-08-23 PROCEDURE — 99214 OFFICE O/P EST MOD 30 MIN: CPT | Performed by: FAMILY MEDICINE

## 2023-08-23 RX ORDER — TRAMADOL HYDROCHLORIDE 50 MG/1
50 TABLET ORAL EVERY 8 HOURS PRN
Qty: 90 TABLET | Refills: 2 | Status: SHIPPED | OUTPATIENT
Start: 2023-08-23 | End: 2023-11-21

## 2023-08-23 RX ORDER — TRIAMCINOLONE ACETONIDE 40 MG/ML
20 INJECTION, SUSPENSION INTRA-ARTICULAR; INTRAMUSCULAR ONCE
Status: COMPLETED | OUTPATIENT
Start: 2023-08-23 | End: 2023-08-23

## 2023-08-23 RX ORDER — LIDOCAINE HYDROCHLORIDE 10 MG/ML
5 INJECTION, SOLUTION INFILTRATION; PERINEURAL ONCE
Status: COMPLETED | OUTPATIENT
Start: 2023-08-23 | End: 2023-08-23

## 2023-08-23 RX ADMIN — LIDOCAINE HYDROCHLORIDE 5 ML: 10 INJECTION, SOLUTION INFILTRATION; PERINEURAL at 09:00

## 2023-08-23 RX ADMIN — TRIAMCINOLONE ACETONIDE 20 MG: 40 INJECTION, SUSPENSION INTRA-ARTICULAR; INTRAMUSCULAR at 09:00

## 2023-08-23 ASSESSMENT — PATIENT HEALTH QUESTIONNAIRE - PHQ9
SUM OF ALL RESPONSES TO PHQ QUESTIONS 1-9: 0
SUM OF ALL RESPONSES TO PHQ QUESTIONS 1-9: 0
SUM OF ALL RESPONSES TO PHQ9 QUESTIONS 1 & 2: 0
1. LITTLE INTEREST OR PLEASURE IN DOING THINGS: 0
SUM OF ALL RESPONSES TO PHQ QUESTIONS 1-9: 0
2. FEELING DOWN, DEPRESSED OR HOPELESS: 0
SUM OF ALL RESPONSES TO PHQ QUESTIONS 1-9: 0

## 2023-08-23 NOTE — PROGRESS NOTES
Sha Perea is a 68 y.o. male  Chief Complaint   Patient presents with    Other     Pt c/o left hip pain. Pt reports that he has had left hip pain for the past year. HPI:  DJD, hip bursitis  R side doing a lot better after last injection, denise well, but L side now more bothersome. UDS good 4/2023, due ~10/2023. Con't tramadol 50mg, using mostly 1 BID nowadays. Ready to renew    DJD L-spine  Doing well for the most part since last visit on the tramadol 50 TID, but having more discomfort over past mo with hunting season, carrying heavy deer, carrying shotgun. Able to perform all of his ADL's without difficulty now. Doing well on tramadol, taking about 3/d now. Remains on cymbalta as adjunct for pain relief. Did good course of phys therapy in past, no help, not interested in more therapy at this time. UDS good 10/2022.  reviewed, in goal, so due today. Hypertension. Blood pressures good today. PT reports is taking meds regularly. Management at last visit included con't current tx. Current regimen: ACEI, calcium channel blocker, thiazide. Symptoms include no symptoms. Patient denies chest pain, palpitations, peripheral edema. Lab review:   Lab review:   Lab Results   Component Value Date     03/27/2023    K 3.6 03/27/2023     03/27/2023    CO2 29 03/27/2023    GLUCOSE 113 (H) 03/27/2023    BUN 18 03/27/2023    CREATININE 0.85 03/27/2023     Hyperlipidemia. On lipitor 20. Denise well. No myalgias, arthralgias, unusual weakness. Lab Results   Component Value Date    CHOL 143 10/07/2022    HDL 37 10/07/2022    LDLCALC 74.6 10/07/2022    TRIG 157 (H) 10/07/2022    AST 22 10/07/2022    ALT 39 10/07/2022    ALKPHOS 113 10/07/2022    BILITOT 0.4 10/07/2022     Smoker  R/S since last visit. Encouraged to try to get quit again. Reviewed PMH, PSH, SH, Medications, allergies (see chart).   Current Outpatient Medications   Medication Sig    lisinopril-hydroCHLOROthiazide (PRINZIDE;ZESTORETIC) 20-12.5

## 2023-09-27 ENCOUNTER — OFFICE VISIT (OUTPATIENT)
Age: 73
End: 2023-09-27
Payer: MEDICARE

## 2023-09-27 VITALS
HEIGHT: 67 IN | WEIGHT: 157.2 LBS | OXYGEN SATURATION: 99 % | DIASTOLIC BLOOD PRESSURE: 62 MMHG | TEMPERATURE: 97.5 F | RESPIRATION RATE: 18 BRPM | SYSTOLIC BLOOD PRESSURE: 136 MMHG | BODY MASS INDEX: 24.67 KG/M2 | HEART RATE: 63 BPM

## 2023-09-27 DIAGNOSIS — E78.2 MIXED HYPERLIPIDEMIA: ICD-10-CM

## 2023-09-27 DIAGNOSIS — I25.10 ASCVD (ARTERIOSCLEROTIC CARDIOVASCULAR DISEASE): Primary | ICD-10-CM

## 2023-09-27 DIAGNOSIS — M51.36 DDD (DEGENERATIVE DISC DISEASE), LUMBAR: ICD-10-CM

## 2023-09-27 DIAGNOSIS — Z51.81 THERAPEUTIC DRUG MONITORING: ICD-10-CM

## 2023-09-27 DIAGNOSIS — H90.3 SENSORINEURAL HEARING LOSS (SNHL) OF BOTH EARS: ICD-10-CM

## 2023-09-27 DIAGNOSIS — I10 PRIMARY HYPERTENSION: ICD-10-CM

## 2023-09-27 DIAGNOSIS — M47.816 LUMBAR SPONDYLOSIS: ICD-10-CM

## 2023-09-27 PROCEDURE — G8420 CALC BMI NORM PARAMETERS: HCPCS | Performed by: FAMILY MEDICINE

## 2023-09-27 PROCEDURE — G8427 DOCREV CUR MEDS BY ELIG CLIN: HCPCS | Performed by: FAMILY MEDICINE

## 2023-09-27 PROCEDURE — 3017F COLORECTAL CA SCREEN DOC REV: CPT | Performed by: FAMILY MEDICINE

## 2023-09-27 PROCEDURE — 3075F SYST BP GE 130 - 139MM HG: CPT | Performed by: FAMILY MEDICINE

## 2023-09-27 PROCEDURE — 99214 OFFICE O/P EST MOD 30 MIN: CPT | Performed by: FAMILY MEDICINE

## 2023-09-27 PROCEDURE — 1036F TOBACCO NON-USER: CPT | Performed by: FAMILY MEDICINE

## 2023-09-27 PROCEDURE — 1123F ACP DISCUSS/DSCN MKR DOCD: CPT | Performed by: FAMILY MEDICINE

## 2023-09-27 PROCEDURE — 3078F DIAST BP <80 MM HG: CPT | Performed by: FAMILY MEDICINE

## 2023-09-27 RX ORDER — DULOXETIN HYDROCHLORIDE 60 MG/1
CAPSULE, DELAYED RELEASE ORAL
Qty: 90 CAPSULE | Refills: 3 | Status: SHIPPED | OUTPATIENT
Start: 2023-09-27

## 2023-09-27 RX ORDER — TRAMADOL HYDROCHLORIDE 50 MG/1
50 TABLET ORAL EVERY 8 HOURS PRN
Qty: 90 TABLET | Refills: 2 | Status: SHIPPED | OUTPATIENT
Start: 2023-09-27 | End: 2023-12-26

## 2023-09-27 ASSESSMENT — PATIENT HEALTH QUESTIONNAIRE - PHQ9
SUM OF ALL RESPONSES TO PHQ9 QUESTIONS 1 & 2: 0
SUM OF ALL RESPONSES TO PHQ QUESTIONS 1-9: 0
1. LITTLE INTEREST OR PLEASURE IN DOING THINGS: 0
2. FEELING DOWN, DEPRESSED OR HOPELESS: 0

## 2023-09-27 NOTE — PATIENT INSTRUCTIONS
Flu shot, RSV vaccine and new COVID omicron vaccines due this fall at your pharmacy. Please get those when available, they can help prevent severe lung disease.

## 2023-09-27 NOTE — PROGRESS NOTES
Katiuska Villalobos is a 68 y.o. male  Chief Complaint   Patient presents with    Hypertension    Chronic Pain     HPI:  DJD, hip bursitis  R side doing a lot better after last injection, denise well, but L side now more bothersome. UDS good 4/2023, due ~10/2023. Con't tramadol 50mg, using mostly 1 BID nowadays. Ready to renew    DJD L-spine  Doing well for the most part since last visit on the tramadol 50 TID, having less discomfort over past few mo. Did great with trochanteric bursitis injection last visit, has complete relief of that issue now. Able to perform all of his ADL's without difficulty now. Doing well on tramadol, taking about 3/d now. Remains on cymbalta as adjunct for pain relief. Did good course of phys therapy in past, no help, not interested in more therapy at this time. UDS good spring 2023.  reviewed, in goal.     Hypertension  Blood pressures good today. PT reports is taking meds regularly. Management at last visit included con't current tx. Current regimen: ACEI, calcium channel blocker, thiazide. Symptoms include no symptoms. Patient denies chest pain, palpitations, peripheral edema. Lab review:   Lab review:   Lab Results   Component Value Date     03/27/2023    K 3.6 03/27/2023     03/27/2023    CO2 29 03/27/2023    GLUCOSE 113 (H) 03/27/2023    BUN 18 03/27/2023    CREATININE 0.85 03/27/2023     Hyperlipidemia  On lipitor 20. Denise well. No myalgias, arthralgias, unusual weakness. Lab Results   Component Value Date    CHOL 143 10/07/2022    HDL 37 10/07/2022    LDLCALC 74.6 10/07/2022    TRIG 157 (H) 10/07/2022    AST 22 10/07/2022    ALT 39 10/07/2022    ALKPHOS 113 10/07/2022    BILITOT 0.4 10/07/2022     exSmoker  Quit again since last visit. Encouraged to stay quit. Reviewed PMH, PSH, SH, Medications, allergies (see chart).   Current Outpatient Medications   Medication Sig    traMADol (ULTRAM) 50 MG tablet Take 1 tablet by mouth every 8 hours as needed for Pain for up to 90

## 2023-09-28 LAB
ALBUMIN SERPL-MCNC: 4.1 G/DL (ref 3.5–5)
ALBUMIN/GLOB SERPL: 1.4 (ref 1.1–2.2)
ALP SERPL-CCNC: 95 U/L (ref 45–117)
ALT SERPL-CCNC: 31 U/L (ref 12–78)
ANION GAP SERPL CALC-SCNC: 4 MMOL/L (ref 5–15)
AST SERPL-CCNC: 13 U/L (ref 15–37)
BILIRUB SERPL-MCNC: 0.3 MG/DL (ref 0.2–1)
BUN SERPL-MCNC: 18 MG/DL (ref 6–20)
BUN/CREAT SERPL: 19 (ref 12–20)
CALCIUM SERPL-MCNC: 9.3 MG/DL (ref 8.5–10.1)
CHLORIDE SERPL-SCNC: 107 MMOL/L (ref 97–108)
CHOLEST SERPL-MCNC: 138 MG/DL
CO2 SERPL-SCNC: 30 MMOL/L (ref 21–32)
CREAT SERPL-MCNC: 0.95 MG/DL (ref 0.7–1.3)
GLOBULIN SER CALC-MCNC: 2.9 G/DL (ref 2–4)
GLUCOSE SERPL-MCNC: 85 MG/DL (ref 65–100)
HDLC SERPL-MCNC: 48 MG/DL
HDLC SERPL: 2.9 (ref 0–5)
LDLC SERPL CALC-MCNC: 73.6 MG/DL (ref 0–100)
POTASSIUM SERPL-SCNC: 3.9 MMOL/L (ref 3.5–5.1)
PROT SERPL-MCNC: 7 G/DL (ref 6.4–8.2)
SODIUM SERPL-SCNC: 141 MMOL/L (ref 136–145)
TRIGL SERPL-MCNC: 82 MG/DL
VLDLC SERPL CALC-MCNC: 16.4 MG/DL

## 2023-09-29 LAB
AMPHETAMINES UR QL SCN: NEGATIVE NG/ML
BARBITURATES UR QL SCN: NEGATIVE NG/ML
BENZODIAZ UR QL SCN: NEGATIVE NG/ML
BZE UR QL SCN: NEGATIVE NG/ML
CANNABINOIDS UR QL SCN: NEGATIVE NG/ML
CREAT UR-MCNC: 21.8 MG/DL (ref 20–300)
LABORATORY COMMENT REPORT: NORMAL
METHADONE UR QL SCN: NEGATIVE NG/ML
OPIATES UR QL SCN: NEGATIVE NG/ML
OXYCODONE+OXYMORPHONE UR QL SCN: NEGATIVE NG/ML
PCP UR QL: NEGATIVE NG/ML
PH UR: 7.6 (ref 4.5–8.9)
PROPOXYPH UR QL SCN: NEGATIVE NG/ML

## 2023-10-27 ENCOUNTER — OFFICE VISIT (OUTPATIENT)
Age: 73
End: 2023-10-27
Payer: MEDICARE

## 2023-10-27 VITALS — TEMPERATURE: 98 F | RESPIRATION RATE: 18 BRPM | OXYGEN SATURATION: 95 % | HEART RATE: 100 BPM

## 2023-10-27 DIAGNOSIS — J06.9 UPPER RESPIRATORY INFECTION, ACUTE: Primary | ICD-10-CM

## 2023-10-27 DIAGNOSIS — R68.83 CHILLS: ICD-10-CM

## 2023-10-27 DIAGNOSIS — R09.81 CONGESTION OF NASAL SINUS: ICD-10-CM

## 2023-10-27 LAB
EXP DATE SOLUTION: NORMAL
EXP DATE SWAB: NORMAL
EXPIRATION DATE: NORMAL
INFLUENZA A ANTIGEN, POC: NEGATIVE
INFLUENZA B ANTIGEN, POC: NEGATIVE
LOT NUMBER POC: NORMAL
LOT NUMBER SOLUTION: NORMAL
LOT NUMBER SWAB: NORMAL
RSV RNA, POC: NEGATIVE
SARS-COV-2 RNA, POC: NEGATIVE
VALID INTERNAL CONTROL, POC: NORMAL
VALID INTERNAL CONTROL, POC: NORMAL

## 2023-10-27 PROCEDURE — 87634 RSV DNA/RNA AMP PROBE: CPT | Performed by: FAMILY MEDICINE

## 2023-10-27 PROCEDURE — 3017F COLORECTAL CA SCREEN DOC REV: CPT | Performed by: FAMILY MEDICINE

## 2023-10-27 PROCEDURE — G8427 DOCREV CUR MEDS BY ELIG CLIN: HCPCS | Performed by: FAMILY MEDICINE

## 2023-10-27 PROCEDURE — 1036F TOBACCO NON-USER: CPT | Performed by: FAMILY MEDICINE

## 2023-10-27 PROCEDURE — 1123F ACP DISCUSS/DSCN MKR DOCD: CPT | Performed by: FAMILY MEDICINE

## 2023-10-27 PROCEDURE — 87502 INFLUENZA DNA AMP PROBE: CPT | Performed by: FAMILY MEDICINE

## 2023-10-27 PROCEDURE — 99213 OFFICE O/P EST LOW 20 MIN: CPT | Performed by: FAMILY MEDICINE

## 2023-10-27 PROCEDURE — G8420 CALC BMI NORM PARAMETERS: HCPCS | Performed by: FAMILY MEDICINE

## 2023-10-27 PROCEDURE — G8484 FLU IMMUNIZE NO ADMIN: HCPCS | Performed by: FAMILY MEDICINE

## 2023-10-27 PROCEDURE — 87635 SARS-COV-2 COVID-19 AMP PRB: CPT | Performed by: FAMILY MEDICINE

## 2023-10-27 RX ORDER — METHYLPREDNISOLONE 4 MG/1
TABLET ORAL
Qty: 1 KIT | Refills: 0 | Status: SHIPPED | OUTPATIENT
Start: 2023-10-27 | End: 2023-10-27 | Stop reason: SDUPTHER

## 2023-10-27 RX ORDER — METHYLPREDNISOLONE 4 MG/1
TABLET ORAL
Qty: 1 KIT | Refills: 0 | Status: SHIPPED | OUTPATIENT
Start: 2023-10-27 | End: 2023-11-02

## 2023-10-27 ASSESSMENT — PATIENT HEALTH QUESTIONNAIRE - PHQ9
2. FEELING DOWN, DEPRESSED OR HOPELESS: 0
SUM OF ALL RESPONSES TO PHQ QUESTIONS 1-9: 0
SUM OF ALL RESPONSES TO PHQ QUESTIONS 1-9: 0
SUM OF ALL RESPONSES TO PHQ9 QUESTIONS 1 & 2: 0
1. LITTLE INTEREST OR PLEASURE IN DOING THINGS: 0
SUM OF ALL RESPONSES TO PHQ QUESTIONS 1-9: 0
SUM OF ALL RESPONSES TO PHQ QUESTIONS 1-9: 0

## 2023-11-02 DIAGNOSIS — R09.81 CONGESTION OF NASAL SINUS: ICD-10-CM

## 2023-11-02 DIAGNOSIS — R68.83 CHILLS: ICD-10-CM

## 2023-11-02 DIAGNOSIS — J06.9 UPPER RESPIRATORY INFECTION, ACUTE: ICD-10-CM

## 2023-11-03 RX ORDER — METHYLPREDNISOLONE 4 MG/1
TABLET ORAL
Qty: 1 KIT | Refills: 0 | Status: SHIPPED | OUTPATIENT
Start: 2023-11-03 | End: 2023-11-08

## 2023-12-29 RX ORDER — DILTIAZEM HYDROCHLORIDE 180 MG/1
CAPSULE, COATED, EXTENDED RELEASE ORAL
Qty: 90 CAPSULE | Refills: 0 | Status: SHIPPED | OUTPATIENT
Start: 2023-12-29

## 2024-01-08 ENCOUNTER — OFFICE VISIT (OUTPATIENT)
Age: 74
End: 2024-01-08
Payer: MEDICARE

## 2024-01-08 VITALS — TEMPERATURE: 98.3 F | RESPIRATION RATE: 19 BRPM | OXYGEN SATURATION: 99 % | HEART RATE: 82 BPM

## 2024-01-08 DIAGNOSIS — J06.9 UPPER RESPIRATORY INFECTION, ACUTE: Primary | ICD-10-CM

## 2024-01-08 DIAGNOSIS — R09.89 CHEST CONGESTION: ICD-10-CM

## 2024-01-08 DIAGNOSIS — R05.1 ACUTE COUGH: ICD-10-CM

## 2024-01-08 DIAGNOSIS — F17.200 SMOKER: ICD-10-CM

## 2024-01-08 LAB
EXP DATE SOLUTION: NORMAL
EXP DATE SWAB: NORMAL
EXPIRATION DATE: NORMAL
INFLUENZA A ANTIGEN, POC: NEGATIVE
INFLUENZA B ANTIGEN, POC: NEGATIVE
LOT NUMBER POC: NORMAL
LOT NUMBER SOLUTION: NORMAL
LOT NUMBER SWAB: NORMAL
RSV RNA: NEGATIVE
SARS-COV-2 RNA, POC: NEGATIVE
VALID INTERNAL CONTROL, POC: NORMAL

## 2024-01-08 PROCEDURE — 1036F TOBACCO NON-USER: CPT | Performed by: FAMILY MEDICINE

## 2024-01-08 PROCEDURE — 99213 OFFICE O/P EST LOW 20 MIN: CPT | Performed by: FAMILY MEDICINE

## 2024-01-08 PROCEDURE — 1123F ACP DISCUSS/DSCN MKR DOCD: CPT | Performed by: FAMILY MEDICINE

## 2024-01-08 PROCEDURE — 87635 SARS-COV-2 COVID-19 AMP PRB: CPT | Performed by: FAMILY MEDICINE

## 2024-01-08 PROCEDURE — 3017F COLORECTAL CA SCREEN DOC REV: CPT | Performed by: FAMILY MEDICINE

## 2024-01-08 PROCEDURE — G8484 FLU IMMUNIZE NO ADMIN: HCPCS | Performed by: FAMILY MEDICINE

## 2024-01-08 PROCEDURE — G8420 CALC BMI NORM PARAMETERS: HCPCS | Performed by: FAMILY MEDICINE

## 2024-01-08 PROCEDURE — 87502 INFLUENZA DNA AMP PROBE: CPT | Performed by: FAMILY MEDICINE

## 2024-01-08 PROCEDURE — 87634 RSV DNA/RNA AMP PROBE: CPT | Performed by: FAMILY MEDICINE

## 2024-01-08 PROCEDURE — G8427 DOCREV CUR MEDS BY ELIG CLIN: HCPCS | Performed by: FAMILY MEDICINE

## 2024-01-08 RX ORDER — PREDNISONE 20 MG/1
TABLET ORAL
Qty: 11 TABLET | Refills: 0 | Status: SHIPPED | OUTPATIENT
Start: 2024-01-08 | End: 2024-01-17

## 2024-01-08 RX ORDER — ALBUTEROL SULFATE 90 UG/1
2 AEROSOL, METERED RESPIRATORY (INHALATION) 4 TIMES DAILY PRN
Qty: 18 G | Refills: 5 | Status: SHIPPED | OUTPATIENT
Start: 2024-01-08

## 2024-01-08 NOTE — PROGRESS NOTES
Ja Gamble is a 73 y.o. male presenting for/with:    Chief Complaint   Patient presents with    Chest Congestion     C/O chest congestion, fever, chills, cough with thick mucous. Taking advil cold & flu. Denies SOB/CP.        Vitals:    01/08/24 1002   Pulse: 82   Resp: 19   Temp: 98.3 °F (36.8 °C)   TempSrc: Oral   SpO2: 99%       Pain Scale: 0 - No pain/10  Pain Location:     \"Have you been to the ER, urgent care clinic since your last visit?  Hospitalized since your last visit?\"    NO    “Have you seen or consulted any other health care providers outside of [facility] since your last visit?”    NO                 10/27/2023    12:03 PM   PHQ-9    Little interest or pleasure in doing things 0   Feeling down, depressed, or hopeless 0   PHQ-2 Score 0   PHQ-9 Total Score 0           6/27/2023     8:10 AM 5/26/2023    11:10 AM 10/7/2022    12:00 AM 2/22/2022    12:00 AM 11/23/2021    12:00 AM 5/25/2021    12:00 AM   [facility] AMB LEARNING ASSESSMENT   Primary Learner Patient Patient Patient Patient Patient Patient   Primary Language ENGLISH ENGLISH ENGLISH ENGLISH ENGLISH ENGLISH   Learning Preference READING READING    LISTENING DEMONSTRATION READING READING READING   Answered By self patient patient Patient Patient patient   Relationship to Learner SELF SELF SELF SELF SELF SELF            10/27/2023    12:04 PM   Amb Fall Risk Assessment and TUG Test   Do you feel unsteady or are you worried about falling?  no   2 or more falls in past year? no   Fall with injury in past year? no           10/27/2023    12:00 PM 9/27/2023     9:00 AM 8/23/2023     8:00 AM 6/27/2023     8:00 AM 5/26/2023    11:00 AM   ADL ASSESSMENT   Feeding yourself No Help Needed No Help Needed No Help Needed No Help Needed No Help Needed   Getting from bed to chair No Help Needed No Help Needed No Help Needed No Help Needed No Help Needed   Getting dressed No Help Needed No Help Needed No Help Needed No Help Needed No Help Needed 
Examination: General appearance - alert, well appearing, and in no distress  Mental status - alert, oriented to person, place, and time  Eyes - pupils equal and reactive, extraocular eye movements intact  ENT - bilateral external ears and nose normal. Normal lips  Neck - supple, no significant adenopathy, no thyromegaly or mass  Chest - clear to auscultation, no wheezes, rales or rhonchi, symmetric air entry  Heart - normal rate, regular rhythm, normal S1, S2, no murmurs, rubs, clicks or gallops  Extremities - peripheral pulses normal, no pedal edema, no clubbing or cyanosis    Results for orders placed or performed in visit on 01/08/24   AMB POC COVID-19 COV   Result Value Ref Range    SARS-COV-2 RNA, POC Negative     Lot number swab      EXP date swab      Lot number solution      EXP date solution      LOT NUMBER POC      EXPIRATION DATE     AMB POC INFLUENZA A  AND B REAL-TIME RT-PCR   Result Value Ref Range    Valid Internal Control, POC Pass     Influenza A Antigen, POC Negative     Influenza B Antigen, POC Negative    POCT RSV Molecular   Result Value Ref Range    RSV RNA Negative        A/P:  Acute bronchitis, likely triggered by viral syndrome in long term smoker  Tx with prednisone taper 40/20/10 3/3/3 and albuterol inhaler 2pf QID. Work on smoking cessation.

## 2024-03-04 ENCOUNTER — TELEPHONE (OUTPATIENT)
Age: 74
End: 2024-03-04

## 2024-03-04 DIAGNOSIS — M47.816 LUMBAR SPONDYLOSIS: ICD-10-CM

## 2024-03-04 DIAGNOSIS — M51.36 DDD (DEGENERATIVE DISC DISEASE), LUMBAR: ICD-10-CM

## 2024-03-04 RX ORDER — TRAMADOL HYDROCHLORIDE 50 MG/1
50 TABLET ORAL EVERY 8 HOURS PRN
Qty: 90 TABLET | Refills: 0 | Status: SHIPPED | OUTPATIENT
Start: 2024-03-04 | End: 2024-06-02

## 2024-03-04 NOTE — TELEPHONE ENCOUNTER
[facility] reviewed. Fill pattern in goal. RF ore. Has visit 3/2024.

## 2024-03-11 SDOH — HEALTH STABILITY: PHYSICAL HEALTH: ON AVERAGE, HOW MANY DAYS PER WEEK DO YOU ENGAGE IN MODERATE TO STRENUOUS EXERCISE (LIKE A BRISK WALK)?: 4 DAYS

## 2024-03-11 SDOH — HEALTH STABILITY: PHYSICAL HEALTH: ON AVERAGE, HOW MANY MINUTES DO YOU ENGAGE IN EXERCISE AT THIS LEVEL?: 20 MIN

## 2024-03-11 ASSESSMENT — PATIENT HEALTH QUESTIONNAIRE - PHQ9
3. TROUBLE FALLING OR STAYING ASLEEP: 2
SUM OF ALL RESPONSES TO PHQ QUESTIONS 1-9: 12
9. THOUGHTS THAT YOU WOULD BE BETTER OFF DEAD, OR OF HURTING YOURSELF: 0
2. FEELING DOWN, DEPRESSED OR HOPELESS: 3
6. FEELING BAD ABOUT YOURSELF - OR THAT YOU ARE A FAILURE OR HAVE LET YOURSELF OR YOUR FAMILY DOWN: 0
SUM OF ALL RESPONSES TO PHQ QUESTIONS 1-9: 12
10. IF YOU CHECKED OFF ANY PROBLEMS, HOW DIFFICULT HAVE THESE PROBLEMS MADE IT FOR YOU TO DO YOUR WORK, TAKE CARE OF THINGS AT HOME, OR GET ALONG WITH OTHER PEOPLE: 1
SUM OF ALL RESPONSES TO PHQ QUESTIONS 1-9: 12
5. POOR APPETITE OR OVEREATING: 3
SUM OF ALL RESPONSES TO PHQ QUESTIONS 1-9: 12
1. LITTLE INTEREST OR PLEASURE IN DOING THINGS: 3
7. TROUBLE CONCENTRATING ON THINGS, SUCH AS READING THE NEWSPAPER OR WATCHING TELEVISION: 0
4. FEELING TIRED OR HAVING LITTLE ENERGY: 1
SUM OF ALL RESPONSES TO PHQ9 QUESTIONS 1 & 2: 6
8. MOVING OR SPEAKING SO SLOWLY THAT OTHER PEOPLE COULD HAVE NOTICED. OR THE OPPOSITE, BEING SO FIGETY OR RESTLESS THAT YOU HAVE BEEN MOVING AROUND A LOT MORE THAN USUAL: 0

## 2024-03-11 ASSESSMENT — LIFESTYLE VARIABLES
HOW OFTEN DO YOU HAVE A DRINK CONTAINING ALCOHOL: NEVER
HOW MANY STANDARD DRINKS CONTAINING ALCOHOL DO YOU HAVE ON A TYPICAL DAY: 0
HOW OFTEN DO YOU HAVE A DRINK CONTAINING ALCOHOL: 1
HOW MANY STANDARD DRINKS CONTAINING ALCOHOL DO YOU HAVE ON A TYPICAL DAY: PATIENT DOES NOT DRINK

## 2024-03-13 ENCOUNTER — OFFICE VISIT (OUTPATIENT)
Age: 74
End: 2024-03-13
Payer: MEDICARE

## 2024-03-13 VITALS
BODY MASS INDEX: 24.48 KG/M2 | TEMPERATURE: 97.5 F | HEIGHT: 67 IN | RESPIRATION RATE: 18 BRPM | DIASTOLIC BLOOD PRESSURE: 71 MMHG | SYSTOLIC BLOOD PRESSURE: 134 MMHG | WEIGHT: 156 LBS | HEART RATE: 74 BPM | OXYGEN SATURATION: 100 %

## 2024-03-13 DIAGNOSIS — R09.89 CHEST CONGESTION: ICD-10-CM

## 2024-03-13 DIAGNOSIS — Z13.6 SCREENING FOR AAA (ABDOMINAL AORTIC ANEURYSM): ICD-10-CM

## 2024-03-13 DIAGNOSIS — I10 PRIMARY HYPERTENSION: ICD-10-CM

## 2024-03-13 DIAGNOSIS — Z51.81 THERAPEUTIC DRUG MONITORING: ICD-10-CM

## 2024-03-13 DIAGNOSIS — M47.816 LUMBAR SPONDYLOSIS: ICD-10-CM

## 2024-03-13 DIAGNOSIS — R05.1 ACUTE COUGH: ICD-10-CM

## 2024-03-13 DIAGNOSIS — I25.10 ASCVD (ARTERIOSCLEROTIC CARDIOVASCULAR DISEASE): ICD-10-CM

## 2024-03-13 DIAGNOSIS — M51.36 DDD (DEGENERATIVE DISC DISEASE), LUMBAR: ICD-10-CM

## 2024-03-13 DIAGNOSIS — Z13.6 SCREENING FOR CARDIOVASCULAR CONDITION: ICD-10-CM

## 2024-03-13 DIAGNOSIS — F17.200 SMOKER: ICD-10-CM

## 2024-03-13 DIAGNOSIS — Z00.00 MEDICARE ANNUAL WELLNESS VISIT, SUBSEQUENT: Primary | ICD-10-CM

## 2024-03-13 DIAGNOSIS — Z87.891 PERSONAL HISTORY OF NICOTINE DEPENDENCE: ICD-10-CM

## 2024-03-13 PROCEDURE — 3075F SYST BP GE 130 - 139MM HG: CPT | Performed by: FAMILY MEDICINE

## 2024-03-13 PROCEDURE — G0439 PPPS, SUBSEQ VISIT: HCPCS | Performed by: FAMILY MEDICINE

## 2024-03-13 PROCEDURE — 1123F ACP DISCUSS/DSCN MKR DOCD: CPT | Performed by: FAMILY MEDICINE

## 2024-03-13 PROCEDURE — G8427 DOCREV CUR MEDS BY ELIG CLIN: HCPCS | Performed by: FAMILY MEDICINE

## 2024-03-13 PROCEDURE — G8420 CALC BMI NORM PARAMETERS: HCPCS | Performed by: FAMILY MEDICINE

## 2024-03-13 PROCEDURE — 99214 OFFICE O/P EST MOD 30 MIN: CPT | Performed by: FAMILY MEDICINE

## 2024-03-13 PROCEDURE — 3017F COLORECTAL CA SCREEN DOC REV: CPT | Performed by: FAMILY MEDICINE

## 2024-03-13 PROCEDURE — 3078F DIAST BP <80 MM HG: CPT | Performed by: FAMILY MEDICINE

## 2024-03-13 PROCEDURE — G8484 FLU IMMUNIZE NO ADMIN: HCPCS | Performed by: FAMILY MEDICINE

## 2024-03-13 PROCEDURE — 1036F TOBACCO NON-USER: CPT | Performed by: FAMILY MEDICINE

## 2024-03-13 RX ORDER — TRAMADOL HYDROCHLORIDE 50 MG/1
50 TABLET ORAL EVERY 8 HOURS PRN
Qty: 90 TABLET | Refills: 2 | Status: SHIPPED | OUTPATIENT
Start: 2024-04-03 | End: 2024-03-13 | Stop reason: SDUPTHER

## 2024-03-13 RX ORDER — TRAMADOL HYDROCHLORIDE 50 MG/1
50 TABLET ORAL EVERY 8 HOURS PRN
Qty: 90 TABLET | Refills: 2 | Status: SHIPPED | OUTPATIENT
Start: 2024-04-03 | End: 2024-07-02

## 2024-03-13 RX ORDER — ALBUTEROL SULFATE 90 UG/1
2 AEROSOL, METERED RESPIRATORY (INHALATION) 4 TIMES DAILY PRN
Qty: 18 G | Refills: 5 | Status: SHIPPED | OUTPATIENT
Start: 2024-03-13

## 2024-03-13 ASSESSMENT — LIFESTYLE VARIABLES
HOW MANY STANDARD DRINKS CONTAINING ALCOHOL DO YOU HAVE ON A TYPICAL DAY: PATIENT DOES NOT DRINK
HOW OFTEN DO YOU HAVE A DRINK CONTAINING ALCOHOL: NEVER

## 2024-03-13 ASSESSMENT — PATIENT HEALTH QUESTIONNAIRE - PHQ9
1. LITTLE INTEREST OR PLEASURE IN DOING THINGS: 0
SUM OF ALL RESPONSES TO PHQ QUESTIONS 1-9: 0
SUM OF ALL RESPONSES TO PHQ QUESTIONS 1-9: 0
2. FEELING DOWN, DEPRESSED OR HOPELESS: 0
SUM OF ALL RESPONSES TO PHQ9 QUESTIONS 1 & 2: 0
SUM OF ALL RESPONSES TO PHQ QUESTIONS 1-9: 0
SUM OF ALL RESPONSES TO PHQ QUESTIONS 1-9: 0

## 2024-03-13 NOTE — PATIENT INSTRUCTIONS
aspirin. Wait for an ambulance. Do not try to drive yourself.  Watch closely for changes in your health, and be sure to contact your doctor if you have any problems.  Where can you learn more?  Go to https://www.Investing.[employer].net/patientEd and enter F075 to learn more about \"A Healthy Heart: Care Instructions.\"  Current as of: June 24, 2023               Content Version: 14.0  © 5422-9070 [employer].   Care instructions adapted under license by [employer]. If you have questions about a medical condition or this instruction, always ask your healthcare professional. [employer] disclaims any warranty or liability for your use of this information.      Personalized Preventive Plan for Ja Gamble - 3/13/2024  Medicare offers a range of preventive health benefits. Some of the tests and screenings are paid in full while other may be subject to a deductible, co-insurance, and/or copay.    Some of these benefits include a comprehensive review of your medical history including lifestyle, illnesses that may run in your family, and various assessments and screenings as appropriate.    After reviewing your medical record and screening and assessments performed today your provider may have ordered immunizations, labs, imaging, and/or referrals for you.  A list of these orders (if applicable) as well as your Preventive Care list are included within your After Visit Summary for your review.    Other Preventive Recommendations:    A preventive eye exam performed by an eye specialist is recommended every 1-2 years to screen for glaucoma; cataracts, macular degeneration, and other eye disorders.  A preventive dental visit is recommended every 6 months.  Try to get at least 150 minutes of exercise per week or 10,000 steps per day on a pedometer .  Order or download the FREE \"Exercise & Physical Activity: Your Everyday Guide\" from [employer]. Call 1-668.475.7911 or search The National

## 2024-03-13 NOTE — PROGRESS NOTES
Ja Gamble is a 73 y.o. male presenting for/with:    Chief Complaint   Patient presents with    Medicare AWV       Vitals:    03/13/24 1027   BP: 134/71   Site: Left Upper Arm   Position: Sitting   Cuff Size: Medium Adult   Pulse: 74   Resp: 18   Temp: 97.5 °F (36.4 °C)   TempSrc: Temporal   SpO2: 100%   Weight: 70.8 kg (156 lb)   Height: 1.702 m (5' 7\")       Pain Scale: 0 - No pain/10  Pain Location:     \"Have you been to the ER, urgent care clinic since your last visit?  Hospitalized since your last visit?\"    NO    “Have you seen or consulted any other health care providers outside of [facility] since your last visit?”    NO                 3/13/2024    10:25 AM   PHQ-9    Little interest or pleasure in doing things 0   Feeling down, depressed, or hopeless 0   PHQ-2 Score 0   PHQ-9 Total Score 0           3/13/2024    10:30 AM 6/27/2023     8:10 AM 5/26/2023    11:10 AM 10/7/2022    12:00 AM 2/22/2022    12:00 AM 11/23/2021    12:00 AM 5/25/2021    12:00 AM   [facility] AMB LEARNING ASSESSMENT   Primary Learner Patient Patient Patient Patient Patient Patient Patient   Primary Language ENGLISH ENGLISH ENGLISH ENGLISH ENGLISH ENGLISH ENGLISH   Learning Preference DEMONSTRATION READING READING    LISTENING DEMONSTRATION READING READING READING   Answered By patient self patient patient Patient Patient patient   Relationship to Learner SELF SELF SELF SELF SELF SELF SELF            3/13/2024    10:29 AM   Amb Fall Risk Assessment and TUG Test   Do you feel unsteady or are you worried about falling?  no   2 or more falls in past year? no   Fall with injury in past year? no           3/13/2024    10:00 AM 10/27/2023    12:00 PM 9/27/2023     9:00 AM 8/23/2023     8:00 AM 6/27/2023     8:00 AM 5/26/2023    11:00 AM   ADL ASSESSMENT   Feeding yourself No Help Needed No Help Needed No Help Needed No Help Needed No Help Needed No Help Needed   Getting from bed to chair No Help Needed No Help Needed No Help

## 2024-03-13 NOTE — PROGRESS NOTES
Ja Gamble is a 73 y.o. male  Chief Complaint   Patient presents with    Medicare AWV     HPI:  DJD L-spine, DJD hip, bursitis  Doing well for the most part since last visit on the tramadol 50 TID, having less discomfort over past few mo. Hip doing ok lately, back more sore from carrying his dog around a lot over past couple months, who was dying of cancer, and recently passed. Able to perform all of his ADL's without difficulty now. Doing well on tramadol, taking about 3/d now. Remains on cymbalta as adjunct for pain relief. Did good course of phys therapy in past, no help, not interested in more therapy at this time.  UDS good fall 2023.  reviewed, in goal.     Hypertension  Blood pressures good today. PT reports is taking meds regularly. Management at last visit included con't current tx. Current regimen: ACEI, calcium channel blocker, thiazide. Symptoms include no symptoms. Patient denies chest pain, palpitations, peripheral edema.  Lab review:   Lab review:   Lab Results   Component Value Date     09/27/2023    K 3.9 09/27/2023     09/27/2023    CO2 30 09/27/2023    GLUCOSE 85 09/27/2023    BUN 18 09/27/2023    CREATININE 0.95 09/27/2023     Hyperlipidemia  On lipitor 20. Kamini well. No myalgias, arthralgias, unusual weakness.  Lab Results   Component Value Date    CHOL 138 09/27/2023    HDL 48 09/27/2023    LDLCALC 73.6 09/27/2023    TRIG 82 09/27/2023    AST 13 (L) 09/27/2023    ALT 31 09/27/2023    ALKPHOS 95 09/27/2023    BILITOT 0.3 09/27/2023     Smoking  Back to smoking again since last visit with recent loss of pet. Will work on that in future.    Reviewed PMH, PSH, SH, Medications, allergies (see chart).  Current Outpatient Medications   Medication Sig    traMADol (ULTRAM) 50 MG tablet Take 1 tablet by mouth every 8 hours as needed for Pain for up to 90 days. TID Max Daily Amount: 150 mg    dilTIAZem (CARDIZEM CD) 180 MG extended release capsule TAKE 1 CAPSULE EVERY DAY FOR PRESSURE

## 2024-03-14 LAB
ANION GAP SERPL CALC-SCNC: 7 MMOL/L (ref 5–15)
BUN SERPL-MCNC: 21 MG/DL (ref 6–20)
BUN/CREAT SERPL: 22 (ref 12–20)
CALCIUM SERPL-MCNC: 9.9 MG/DL (ref 8.5–10.1)
CHLORIDE SERPL-SCNC: 105 MMOL/L (ref 97–108)
CO2 SERPL-SCNC: 26 MMOL/L (ref 21–32)
CREAT SERPL-MCNC: 0.95 MG/DL (ref 0.7–1.3)
GLUCOSE SERPL-MCNC: 95 MG/DL (ref 65–100)
POTASSIUM SERPL-SCNC: 3.8 MMOL/L (ref 3.5–5.1)
SODIUM SERPL-SCNC: 138 MMOL/L (ref 136–145)

## 2024-03-15 LAB
AMPHETAMINES UR QL SCN: NEGATIVE NG/ML
BARBITURATES UR QL SCN: NEGATIVE NG/ML
BENZODIAZ UR QL SCN: NEGATIVE NG/ML
BZE UR QL SCN: NEGATIVE NG/ML
CANNABINOIDS UR QL SCN: NEGATIVE NG/ML
CREAT UR-MCNC: 51.4 MG/DL (ref 20–300)
LABORATORY COMMENT REPORT: NORMAL
METHADONE UR QL SCN: NEGATIVE NG/ML
OPIATES UR QL SCN: NEGATIVE NG/ML
OXYCODONE+OXYMORPHONE UR QL SCN: NEGATIVE NG/ML
PCP UR QL: NEGATIVE NG/ML
PH UR: 7.3 (ref 4.5–8.9)
PROPOXYPH UR QL SCN: NEGATIVE NG/ML

## 2024-03-18 RX ORDER — DILTIAZEM HYDROCHLORIDE 180 MG/1
CAPSULE, COATED, EXTENDED RELEASE ORAL
Qty: 90 CAPSULE | Refills: 3 | Status: SHIPPED | OUTPATIENT
Start: 2024-03-18

## 2024-03-19 ENCOUNTER — HOSPITAL ENCOUNTER (EMERGENCY)
Facility: HOSPITAL | Age: 74
Discharge: HOME | End: 2024-03-19
Attending: EMERGENCY MEDICINE
Payer: MEDICARE

## 2024-03-19 ENCOUNTER — APPOINTMENT (OUTPATIENT)
Facility: HOSPITAL | Age: 74
End: 2024-03-19
Payer: MEDICARE

## 2024-03-19 VITALS
WEIGHT: 165 LBS | HEART RATE: 90 BPM | BODY MASS INDEX: 24.44 KG/M2 | HEIGHT: 69 IN | OXYGEN SATURATION: 98 % | SYSTOLIC BLOOD PRESSURE: 148 MMHG | RESPIRATION RATE: 16 BRPM | DIASTOLIC BLOOD PRESSURE: 71 MMHG | TEMPERATURE: 98.4 F
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
## 2024-03-19 DIAGNOSIS — S41.152A DOG BITE OF LEFT ARM, INITIAL ENCOUNTER: Primary | ICD-10-CM

## 2024-03-19 PROCEDURE — 6370000000 HC RX 637 (ALT 250 FOR IP): Performed by: EMERGENCY MEDICINE

## 2024-03-19 PROCEDURE — 73130 X-RAY EXAM OF HAND: CPT

## 2024-03-19 PROCEDURE — 99283 EMERGENCY DEPT VISIT LOW MDM: CPT

## 2024-03-19 RX ORDER — AMOXICILLIN AND CLAVULANATE POTASSIUM 875; 125 MG/1; MG/1
1 TABLET, FILM COATED ORAL 2 TIMES DAILY
Qty: 20 TABLET | Refills: 0 | Status: SHIPPED | OUTPATIENT
Start: 2024-03-19 | End: 2024-03-29

## 2024-03-19 RX ORDER — BACITRACIN ZINC 500 [USP'U]/G
OINTMENT TOPICAL 2 TIMES DAILY
Status: DISCONTINUED | OUTPATIENT
Start: 2024-03-19 | End: 2024-03-19

## 2024-03-19 RX ORDER — BACITRACIN ZINC 500 [USP'U]/G
OINTMENT TOPICAL 2 TIMES DAILY
Status: DISCONTINUED | OUTPATIENT
Start: 2024-03-19 | End: 2024-03-19 | Stop reason: HOSPADM

## 2024-03-19 RX ADMIN — BACITRACIN ZINC: 500 OINTMENT TOPICAL at 16:49

## 2024-03-19 ASSESSMENT — ENCOUNTER SYMPTOMS
VOMITING: 0
DIARRHEA: 0
ABDOMINAL PAIN: 0
COUGH: 0
NAUSEA: 0
SORE THROAT: 0
EYE REDNESS: 0

## 2024-03-19 ASSESSMENT — PAIN DESCRIPTION - DESCRIPTORS
DESCRIPTORS: ACHING
DESCRIPTORS: ACHING

## 2024-03-19 ASSESSMENT — PAIN - FUNCTIONAL ASSESSMENT
PAIN_FUNCTIONAL_ASSESSMENT: 0-10
PAIN_FUNCTIONAL_ASSESSMENT: 0-10

## 2024-03-19 ASSESSMENT — LIFESTYLE VARIABLES
HOW MANY STANDARD DRINKS CONTAINING ALCOHOL DO YOU HAVE ON A TYPICAL DAY: PATIENT DOES NOT DRINK
HOW OFTEN DO YOU HAVE A DRINK CONTAINING ALCOHOL: NEVER
HOW OFTEN DO YOU HAVE A DRINK CONTAINING ALCOHOL: NEVER

## 2024-03-19 ASSESSMENT — PAIN DESCRIPTION - ORIENTATION: ORIENTATION: LEFT

## 2024-03-19 ASSESSMENT — PAIN DESCRIPTION - LOCATION
LOCATION: ARM
LOCATION: ARM

## 2024-03-19 ASSESSMENT — PAIN SCALES - GENERAL: PAINLEVEL_OUTOF10: 4

## 2024-03-19 NOTE — ED PROVIDER NOTES
EMERGENCY DEPARTMENT HISTORY AND PHYSICAL EXAM      Date: 3/19/2024  Patient Name: Ja Gamble    History of Presenting Illness     Chief Complaint   Patient presents with    Animal Bite       History Provided By: Patient    HPI: Ja Gamble, 73 y.o. male with past medical history listed below, presents via private vehicle to the ED with cc of dog bite to left forearm and hand.  Patient reports his neighbors Paske attacked him on Sunday, March 17.  He sustained bites to his left upper arm forearm and hand.  No other injuries.  Dog sugars stated at the patient's shots were up-to-date.  He did not have proof of this.  He refuses any rabies vaccine at this time.  He was worried about infection.  He noticed some mild swelling in the dorsum of his hand today.  He is right-hand dominant.  He also complains of some pain in his pinky and think it might be broken.  No fevers or chills.  His wife has been doing his wound care and wrapping the wound daily.        There are no other complaints, changes, or physical findings at this time.    PCP: Ruben Kelly MD    No current facility-administered medications on file prior to encounter.     Current Outpatient Medications on File Prior to Encounter   Medication Sig Dispense Refill    dilTIAZem (CARDIZEM CD) 180 MG extended release capsule TAKE 1 CAPSULE EVERY DAY FOR PRESSURE AND PULSE 90 capsule 3    albuterol sulfate HFA (VENTOLIN HFA) 108 (90 Base) MCG/ACT inhaler Inhale 2 puffs into the lungs 4 times daily as needed for Wheezing 18 g 5    [START ON 4/3/2024] traMADol (ULTRAM) 50 MG tablet Take 1 tablet by mouth every 8 hours as needed for Pain for up to 90 days. TID Max Daily Amount: 150 mg 90 tablet 2    DULoxetine (CYMBALTA) 60 MG extended release capsule Indications: Nerves and pain TAKE 1 CAPSULE EVERY DAY FOR NERVE PAIN  Indications: joint pain and nerve pain 90 capsule 3    lisinopril-hydroCHLOROthiazide (PRINZIDE;ZESTORETIC) 20-12.5 MG per tablet TAKE

## 2024-03-19 NOTE — ED TRIAGE NOTES
Patient presents to the ED with a complaint of a dog bite to the entire left arm that occurred on [address]-.  No bleeding at this time.  + laceration with bandages on left hand and arm that was there PTA.

## 2024-03-27 ENCOUNTER — OFFICE VISIT (OUTPATIENT)
Age: 74
End: 2024-03-27
Payer: MEDICARE

## 2024-03-27 VITALS
HEART RATE: 87 BPM | WEIGHT: 153.8 LBS | TEMPERATURE: 97.1 F | BODY MASS INDEX: 22.78 KG/M2 | DIASTOLIC BLOOD PRESSURE: 78 MMHG | OXYGEN SATURATION: 98 % | RESPIRATION RATE: 18 BRPM | SYSTOLIC BLOOD PRESSURE: 158 MMHG | HEIGHT: 69 IN

## 2024-03-27 DIAGNOSIS — S61.217D: ICD-10-CM
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
## 2024-03-27 DIAGNOSIS — Z23 ENCOUNTER FOR IMMUNIZATION: ICD-10-CM

## 2024-03-27 PROCEDURE — G8484 FLU IMMUNIZE NO ADMIN: HCPCS | Performed by: FAMILY MEDICINE

## 2024-03-27 PROCEDURE — 1036F TOBACCO NON-USER: CPT | Performed by: FAMILY MEDICINE

## 2024-03-27 PROCEDURE — 3078F DIAST BP <80 MM HG: CPT | Performed by: FAMILY MEDICINE

## 2024-03-27 PROCEDURE — 90471 IMMUNIZATION ADMIN: CPT | Performed by: FAMILY MEDICINE

## 2024-03-27 PROCEDURE — 3077F SYST BP >= 140 MM HG: CPT | Performed by: FAMILY MEDICINE

## 2024-03-27 PROCEDURE — 1123F ACP DISCUSS/DSCN MKR DOCD: CPT | Performed by: FAMILY MEDICINE

## 2024-03-27 PROCEDURE — 90715 TDAP VACCINE 7 YRS/> IM: CPT | Performed by: FAMILY MEDICINE

## 2024-03-27 PROCEDURE — 99212 OFFICE O/P EST SF 10 MIN: CPT | Performed by: FAMILY MEDICINE

## 2024-03-27 PROCEDURE — G8427 DOCREV CUR MEDS BY ELIG CLIN: HCPCS | Performed by: FAMILY MEDICINE

## 2024-03-27 PROCEDURE — 3017F COLORECTAL CA SCREEN DOC REV: CPT | Performed by: FAMILY MEDICINE

## 2024-03-27 PROCEDURE — G8420 CALC BMI NORM PARAMETERS: HCPCS | Performed by: FAMILY MEDICINE

## 2024-03-27 ASSESSMENT — PATIENT HEALTH QUESTIONNAIRE - PHQ9
SUM OF ALL RESPONSES TO PHQ QUESTIONS 1-9: 0
SUM OF ALL RESPONSES TO PHQ9 QUESTIONS 1 & 2: 0
1. LITTLE INTEREST OR PLEASURE IN DOING THINGS: NOT AT ALL
SUM OF ALL RESPONSES TO PHQ QUESTIONS 1-9: 0
SUM OF ALL RESPONSES TO PHQ QUESTIONS 1-9: 0
2. FEELING DOWN, DEPRESSED OR HOPELESS: NOT AT ALL
SUM OF ALL RESPONSES TO PHQ QUESTIONS 1-9: 0

## 2024-03-27 NOTE — PROGRESS NOTES
Ja Gamble is a 73 y.o. male  Chief Complaint   Patient presents with    ED Follow-up     [facility] ER 3/19/24 Dog bite laceration.        HPI:  Pt here for quick wound check s/p dog bite (neighbor pet, vaccines UTD) 3/19/24. Seen in [facility] ED, had wound care, and started on Augmentin. Kamini well, minimal pain since, no f/c, no abn redness to area per pt.    Reviewed PMH, PSH, SH, Medications, allergies (see chart).  Current Outpatient Medications   Medication Sig    amoxicillin-clavulanate (AUGMENTIN) 875-125 MG per tablet Take 1 tablet by mouth 2 times daily for 10 days    dilTIAZem (CARDIZEM CD) 180 MG extended release capsule TAKE 1 CAPSULE EVERY DAY FOR PRESSURE AND PULSE    albuterol sulfate HFA (VENTOLIN HFA) 108 (90 Base) MCG/ACT inhaler Inhale 2 puffs into the lungs 4 times daily as needed for Wheezing    [START ON 4/3/2024] traMADol (ULTRAM) 50 MG tablet Take 1 tablet by mouth every 8 hours as needed for Pain for up to 90 days. TID Max Daily Amount: 150 mg    DULoxetine (CYMBALTA) 60 MG extended release capsule Indications: Nerves and pain TAKE 1 CAPSULE EVERY DAY FOR NERVE PAIN  Indications: joint pain and nerve pain    lisinopril-hydroCHLOROthiazide (PRINZIDE;ZESTORETIC) 20-12.5 MG per tablet TAKE 1 TABLET EVERY DAY (REPLACES PLAIN LISINOPRIL).    atorvastatin (LIPITOR) 20 MG tablet TAKE 1 TABLET EVERY DAY FOR CHOLESTEROL AND HEART    triamcinolone (KENALOG) 0.1 % cream APPLY A THIN LAYER TO AFFECTED AREA TWICE DAILY    aspirin 81 MG EC tablet Take 1 tablet by mouth daily    dilTIAZem (TIAZAC) 180 MG extended release capsule      No current facility-administered medications for this visit.     ROS:   General: No fever, chills, or abnormal weight loss  Respiratory: No cough, dyspnea  CV: No chest pain, palpitations    Objective:  Visit Vitals  BP (!) 158/78   Pulse 87   Temp 97.1 °F (36.2 °C) (Temporal)   Resp 18   Ht 1.753 m (5' 9\")   Wt 69.8 kg (153 lb 12.8 oz)   SpO2 98%   BMI 22.71 kg/m²     Wt Readings 
you in a relationship with someone who physically or mentally threatens you? N   Is it safe for you to go home? Y       Advance Care Planning     The patient has appointed the following active healthcare agents:    Primary Decision Maker: Britni Gamble - Spouse - [phone]    After obtaining consent, and per orders of Dr. Kelly, injection of Tdap given in Right deltoid by JIM NAGY LPN. Patient instructed to remain in clinic for 20 minutes afterwards, and to report any adverse reaction to me immediately.

## 2024-05-03 DIAGNOSIS — R09.81 CONGESTION OF NASAL SINUS: ICD-10-CM

## 2024-05-03 DIAGNOSIS — J06.9 UPPER RESPIRATORY INFECTION, ACUTE: ICD-10-CM

## 2024-05-03 DIAGNOSIS — R68.83 CHILLS: ICD-10-CM

## 2024-05-03 RX ORDER — METHYLPREDNISOLONE 4 MG/1
TABLET ORAL
Qty: 21 TABLET | OUTPATIENT
Start: 2024-05-03

## 2024-05-28 ENCOUNTER — OFFICE VISIT (OUTPATIENT)
Age: 74
End: 2024-05-28
Payer: MEDICARE

## 2024-05-28 VITALS
OXYGEN SATURATION: 98 % | HEIGHT: 69 IN | HEART RATE: 96 BPM | BODY MASS INDEX: 22.72 KG/M2 | TEMPERATURE: 98.2 F | RESPIRATION RATE: 18 BRPM | DIASTOLIC BLOOD PRESSURE: 70 MMHG | WEIGHT: 153.4 LBS | SYSTOLIC BLOOD PRESSURE: 138 MMHG

## 2024-05-28 DIAGNOSIS — M51.36 DDD (DEGENERATIVE DISC DISEASE), LUMBAR: Primary | ICD-10-CM

## 2024-05-28 DIAGNOSIS — I10 PRIMARY HYPERTENSION: ICD-10-CM

## 2024-05-28 DIAGNOSIS — I25.10 ASCVD (ARTERIOSCLEROTIC CARDIOVASCULAR DISEASE): ICD-10-CM

## 2024-05-28 DIAGNOSIS — Z87.891 PERSONAL HISTORY OF TOBACCO USE: ICD-10-CM

## 2024-05-28 DIAGNOSIS — M47.816 LUMBAR SPONDYLOSIS: ICD-10-CM

## 2024-05-28 PROCEDURE — G8427 DOCREV CUR MEDS BY ELIG CLIN: HCPCS | Performed by: FAMILY MEDICINE

## 2024-05-28 PROCEDURE — G8420 CALC BMI NORM PARAMETERS: HCPCS | Performed by: FAMILY MEDICINE

## 2024-05-28 PROCEDURE — 3017F COLORECTAL CA SCREEN DOC REV: CPT | Performed by: FAMILY MEDICINE

## 2024-05-28 PROCEDURE — 99214 OFFICE O/P EST MOD 30 MIN: CPT | Performed by: FAMILY MEDICINE

## 2024-05-28 PROCEDURE — 4004F PT TOBACCO SCREEN RCVD TLK: CPT | Performed by: FAMILY MEDICINE

## 2024-05-28 PROCEDURE — 3075F SYST BP GE 130 - 139MM HG: CPT | Performed by: FAMILY MEDICINE

## 2024-05-28 PROCEDURE — G0296 VISIT TO DETERM LDCT ELIG: HCPCS | Performed by: FAMILY MEDICINE

## 2024-05-28 PROCEDURE — 1123F ACP DISCUSS/DSCN MKR DOCD: CPT | Performed by: FAMILY MEDICINE

## 2024-05-28 PROCEDURE — 3078F DIAST BP <80 MM HG: CPT | Performed by: FAMILY MEDICINE

## 2024-05-28 RX ORDER — PREDNISONE 20 MG/1
TABLET ORAL
Qty: 18 TABLET | Refills: 0 | Status: SHIPPED | OUTPATIENT
Start: 2024-05-28 | End: 2024-06-05

## 2024-05-28 ASSESSMENT — PATIENT HEALTH QUESTIONNAIRE - PHQ9
SUM OF ALL RESPONSES TO PHQ QUESTIONS 1-9: 0
1. LITTLE INTEREST OR PLEASURE IN DOING THINGS: NOT AT ALL
SUM OF ALL RESPONSES TO PHQ9 QUESTIONS 1 & 2: 0
2. FEELING DOWN, DEPRESSED OR HOPELESS: NOT AT ALL

## 2024-05-28 NOTE — PROGRESS NOTES
Ja Gamble is a 74 y.o. male  Chief Complaint   Patient presents with   • Back Pain     Mid lower back pain x 3 weeks.      HPI:  DJD L-spine, DJD hip, bursitis  Doing well for the most part since last visit on the tramadol 50 TID, having less discomfort over past few mo. Hip doing ok lately. Back was doing better, but then became more sore a couple weeks ago. No known trauma or overuse, no further carrying his dog around. Less able to perform his ADL's, more trouble sleeping. Has been taking tramadol, 2-3/d, denise well, but not getting good relief lately. Remains on cymbalta as adjunct for pain relief. Did good course of phys therapy in past, no help, not interested in more therapy at this time.  UDS good 3/2024.  reviewed, in goal.     Hypertension  Blood pressures good today. PT reports is taking meds regularly. Management at last visit included con't current tx. Current regimen: ACEI, calcium channel blocker, thiazide. Symptoms include no symptoms. Patient denies chest pain, palpitations, peripheral edema.   Lab review:   Lab Results   Component Value Date     03/13/2024    K 3.8 03/13/2024     03/13/2024    CO2 26 03/13/2024    GLUCOSE 95 03/13/2024    BUN 21 (H) 03/13/2024    CREATININE 0.95 03/13/2024     Hyperlipidemia  On lipitor 20. Denise well. No myalgias, arthralgias, unusual weakness.  Lab Results   Component Value Date    CHOL 138 09/27/2023    HDL 48 09/27/2023    TRIG 82 09/27/2023    AST 13 (L) 09/27/2023    ALT 31 09/27/2023    ALKPHOS 95 09/27/2023    BILITOT 0.3 09/27/2023     Smoking  Back to smoking again since last visit with recent loss of pet. Will work on that in future.    Reviewed PMH, PSH, SH, Medications, allergies (see chart).  Current Outpatient Medications   Medication Sig   • predniSONE (DELTASONE) 20 MG tablet Take 3 tablets by mouth daily for 3 days, THEN 2 tablets daily for 3 days, THEN 1 tablet daily for 3 days.   • dilTIAZem (CARDIZEM CD) 180 MG extended 
you in a relationship with someone who physically or mentally threatens you? N   Is it safe for you to go home? Y       Advance Care Planning     The patient has appointed the following active healthcare agents:    Primary Decision Maker: Britni Gamble - Spouse - [phone]

## 2024-06-07 ENCOUNTER — HOSPITAL ENCOUNTER (OUTPATIENT)
Facility: HOSPITAL | Age: 74
End: 2024-06-07
Attending: FAMILY MEDICINE
Payer: MEDICARE

## 2024-06-07 DIAGNOSIS — Z13.6 SCREENING FOR AAA (ABDOMINAL AORTIC ANEURYSM): ICD-10-CM

## 2024-06-07 DIAGNOSIS — Z87.891 PERSONAL HISTORY OF TOBACCO USE: ICD-10-CM

## 2024-06-07 DIAGNOSIS — Z13.6 SCREENING FOR CARDIOVASCULAR CONDITION: ICD-10-CM

## 2024-06-07 DIAGNOSIS — Z87.891 PERSONAL HISTORY OF NICOTINE DEPENDENCE: ICD-10-CM

## 2024-06-07 DIAGNOSIS — F17.200 SMOKER: ICD-10-CM

## 2024-06-07 PROCEDURE — 71271 CT THORAX LUNG CANCER SCR C-: CPT

## 2024-06-07 PROCEDURE — 76706 US ABDL AORTA SCREEN AAA: CPT

## 2024-06-09 DIAGNOSIS — M51.36 DDD (DEGENERATIVE DISC DISEASE), LUMBAR: ICD-10-CM

## 2024-06-10 RX ORDER — PREDNISONE 20 MG/1
TABLET ORAL
Qty: 18 TABLET | Refills: 0 | OUTPATIENT
Start: 2024-06-10

## 2024-06-21 ENCOUNTER — OFFICE VISIT (OUTPATIENT)
Age: 74
End: 2024-06-21
Payer: MEDICARE

## 2024-06-21 VITALS
WEIGHT: 151.2 LBS | HEART RATE: 88 BPM | BODY MASS INDEX: 22.39 KG/M2 | SYSTOLIC BLOOD PRESSURE: 142 MMHG | TEMPERATURE: 98 F | RESPIRATION RATE: 18 BRPM | DIASTOLIC BLOOD PRESSURE: 68 MMHG | HEIGHT: 69 IN | OXYGEN SATURATION: 98 %

## 2024-06-21 DIAGNOSIS — M47.816 LUMBAR SPONDYLOSIS: ICD-10-CM

## 2024-06-21 DIAGNOSIS — I10 PRIMARY HYPERTENSION: Primary | ICD-10-CM

## 2024-06-21 DIAGNOSIS — M51.36 DDD (DEGENERATIVE DISC DISEASE), LUMBAR: ICD-10-CM

## 2024-06-21 DIAGNOSIS — M17.32 POST-TRAUMATIC OSTEOARTHRITIS OF LEFT KNEE: ICD-10-CM

## 2024-06-21 DIAGNOSIS — I25.10 ASCVD (ARTERIOSCLEROTIC CARDIOVASCULAR DISEASE): ICD-10-CM

## 2024-06-21 DIAGNOSIS — Z71.6 ENCOUNTER FOR SMOKING CESSATION COUNSELING: ICD-10-CM

## 2024-06-21 PROCEDURE — 99214 OFFICE O/P EST MOD 30 MIN: CPT | Performed by: FAMILY MEDICINE

## 2024-06-21 PROCEDURE — 1123F ACP DISCUSS/DSCN MKR DOCD: CPT | Performed by: FAMILY MEDICINE

## 2024-06-21 PROCEDURE — G8420 CALC BMI NORM PARAMETERS: HCPCS | Performed by: FAMILY MEDICINE

## 2024-06-21 PROCEDURE — G8427 DOCREV CUR MEDS BY ELIG CLIN: HCPCS | Performed by: FAMILY MEDICINE

## 2024-06-21 PROCEDURE — 4004F PT TOBACCO SCREEN RCVD TLK: CPT | Performed by: FAMILY MEDICINE

## 2024-06-21 PROCEDURE — 3078F DIAST BP <80 MM HG: CPT | Performed by: FAMILY MEDICINE

## 2024-06-21 PROCEDURE — 3077F SYST BP >= 140 MM HG: CPT | Performed by: FAMILY MEDICINE

## 2024-06-21 PROCEDURE — 3017F COLORECTAL CA SCREEN DOC REV: CPT | Performed by: FAMILY MEDICINE

## 2024-06-21 RX ORDER — VARENICLINE TARTRATE 1 MG/1
.5-1 TABLET, FILM COATED ORAL 2 TIMES DAILY
Qty: 60 TABLET | Refills: 2 | Status: SHIPPED | OUTPATIENT
Start: 2024-06-21

## 2024-06-21 RX ORDER — TRAMADOL HYDROCHLORIDE 50 MG/1
50 TABLET ORAL EVERY 8 HOURS PRN
Qty: 90 TABLET | Refills: 2 | Status: SHIPPED | OUTPATIENT
Start: 2024-07-02 | End: 2024-09-30

## 2024-06-21 SDOH — ECONOMIC STABILITY: FOOD INSECURITY: WITHIN THE PAST 12 MONTHS, THE FOOD YOU BOUGHT JUST DIDN'T LAST AND YOU DIDN'T HAVE MONEY TO GET MORE.: NEVER TRUE

## 2024-06-21 SDOH — ECONOMIC STABILITY: FOOD INSECURITY: WITHIN THE PAST 12 MONTHS, YOU WORRIED THAT YOUR FOOD WOULD RUN OUT BEFORE YOU GOT MONEY TO BUY MORE.: NEVER TRUE

## 2024-06-21 SDOH — ECONOMIC STABILITY: INCOME INSECURITY: HOW HARD IS IT FOR YOU TO PAY FOR THE VERY BASICS LIKE FOOD, HOUSING, MEDICAL CARE, AND HEATING?: NOT HARD AT ALL

## 2024-06-21 ASSESSMENT — PATIENT HEALTH QUESTIONNAIRE - PHQ9
SUM OF ALL RESPONSES TO PHQ QUESTIONS 1-9: 0
SUM OF ALL RESPONSES TO PHQ QUESTIONS 1-9: 0
2. FEELING DOWN, DEPRESSED OR HOPELESS: NOT AT ALL
SUM OF ALL RESPONSES TO PHQ QUESTIONS 1-9: 0
SUM OF ALL RESPONSES TO PHQ QUESTIONS 1-9: 0
SUM OF ALL RESPONSES TO PHQ9 QUESTIONS 1 & 2: 0
1. LITTLE INTEREST OR PLEASURE IN DOING THINGS: NOT AT ALL

## 2024-06-21 NOTE — PROGRESS NOTES
Ja Gamble is a 74 y.o. male  Chief Complaint   Patient presents with    Hypertension    Medication Check     HPI:  DJD L-spine, DJD hip, bursitis  Doing well for the most part since last visit on the tramadol 50 TID, having less discomfort over past few mo. Hip doing ok lately. Back was doing better, but then became more sore a couple weeks ago. No known trauma or overuse, no further carrying his dog around. Less able to perform his ADL's, more trouble sleeping. Has been taking tramadol, 2-3/d, denise well, but not getting good relief lately. Remains on cymbalta as adjunct for pain relief. Did good course of phys therapy in past, no help, not interested in more therapy at this time.  UDS good 3/2024.  reviewed, in goal.     Hypertension  Blood pressures good today. PT reports is taking meds regularly. Management at last visit included con't current tx. Current regimen: ACEI, calcium channel blocker, thiazide. Symptoms include no symptoms. Patient denies chest pain, palpitations, peripheral edema.   Lab review:   Lab Results   Component Value Date     03/13/2024    K 3.8 03/13/2024     03/13/2024    CO2 26 03/13/2024    GLUCOSE 95 03/13/2024    BUN 21 (H) 03/13/2024    CREATININE 0.95 03/13/2024     Hyperlipidemia  On lipitor 20. Denise well. No myalgias, arthralgias, unusual weakness.  Lab Results   Component Value Date    CHOL 138 09/27/2023    HDL 48 09/27/2023    TRIG 82 09/27/2023    AST 13 (L) 09/27/2023    ALT 31 09/27/2023    ALKPHOS 95 09/27/2023    BILITOT 0.3 09/27/2023     Smoking  Back to smoking again since last visit with recent loss of pet. Will work on that in future.    Reviewed PMH, PSH, SH, Medications, allergies (see chart).  Current Outpatient Medications   Medication Sig    dilTIAZem (CARDIZEM CD) 180 MG extended release capsule TAKE 1 CAPSULE EVERY DAY FOR PRESSURE AND PULSE    albuterol sulfate HFA (VENTOLIN HFA) 108 (90 Base) MCG/ACT inhaler Inhale 2 puffs into the lungs 4 
No Help Needed No Help Needed No Help Needed No Help Needed No Help Needed   Getting dressed No Help Needed No Help Needed No Help Needed No Help Needed No Help Needed No Help Needed No Help Needed   Bathing or showering No Help Needed No Help Needed No Help Needed No Help Needed No Help Needed No Help Needed No Help Needed   Walk across the room (includes cane/walker) No Help Needed No Help Needed No Help Needed No Help Needed No Help Needed No Help Needed No Help Needed   Using the telphone No Help Needed No Help Needed No Help Needed No Help Needed No Help Needed No Help Needed No Help Needed   Taking your medications No Help Needed No Help Needed No Help Needed No Help Needed No Help Needed No Help Needed No Help Needed   Preparing meals No Help Needed No Help Needed No Help Needed No Help Needed No Help Needed No Help Needed No Help Needed   Managing money (expenses/bills) No Help Needed No Help Needed No Help Needed No Help Needed No Help Needed No Help Needed No Help Needed   Moderately strenuous housework (laundry) No Help Needed No Help Needed No Help Needed No Help Needed No Help Needed No Help Needed No Help Needed   Shopping for personal items (toiletries/medicines) No Help Needed No Help Needed No Help Needed No Help Needed No Help Needed No Help Needed No Help Needed   Shopping for groceries No Help Needed No Help Needed No Help Needed No Help Needed No Help Needed No Help Needed No Help Needed   Driving No Help Needed No Help Needed No Help Needed No Help Needed No Help Needed No Help Needed No Help Needed   Climbing a flight of stairs No Help Needed No Help Needed No Help Needed No Help Needed No Help Needed No Help Needed No Help Needed   Getting to places beyond walking distances No Help Needed No Help Needed No Help Needed No Help Needed No Help Needed No Help Needed No Help Needed           6/21/2024    10:00 AM   AMB Abuse Screening   Do you ever feel afraid of your partner? N   Are you in a

## 2024-06-26 RX ORDER — TRIAMCINOLONE ACETONIDE 1 MG/G
CREAM TOPICAL
Qty: 60 G | Refills: 5 | Status: SHIPPED | OUTPATIENT
Start: 2024-06-26

## 2024-07-08 ENCOUNTER — NURSE ONLY (OUTPATIENT)
Age: 74
End: 2024-07-08
Payer: MEDICARE

## 2024-07-08 DIAGNOSIS — Z12.11 ENCOUNTER FOR SCREENING FECAL OCCULT BLOOD TESTING: ICD-10-CM

## 2024-07-08 DIAGNOSIS — M51.36 DDD (DEGENERATIVE DISC DISEASE), LUMBAR: Primary | ICD-10-CM

## 2024-07-08 PROCEDURE — 96372 THER/PROPH/DIAG INJ SC/IM: CPT | Performed by: FAMILY MEDICINE

## 2024-07-08 RX ORDER — KETOROLAC TROMETHAMINE 30 MG/ML
30 INJECTION, SOLUTION INTRAMUSCULAR; INTRAVENOUS ONCE
Status: COMPLETED | OUTPATIENT
Start: 2024-07-08 | End: 2024-07-08

## 2024-07-08 RX ADMIN — KETOROLAC TROMETHAMINE 30 MG: 30 INJECTION, SOLUTION INTRAMUSCULAR; INTRAVENOUS at 09:12

## 2024-07-08 NOTE — PROGRESS NOTES
Patient here today for Toradol injection 30 mg per PCP last note. Tolerated well. No adverse reaction at this time.

## 2024-07-18 ENCOUNTER — OFFICE VISIT (OUTPATIENT)
Age: 74
End: 2024-07-18
Payer: MEDICARE

## 2024-07-18 VITALS
DIASTOLIC BLOOD PRESSURE: 68 MMHG | RESPIRATION RATE: 18 BRPM | TEMPERATURE: 97.7 F | BODY MASS INDEX: 22.33 KG/M2 | WEIGHT: 150.8 LBS | HEIGHT: 69 IN | HEART RATE: 82 BPM | SYSTOLIC BLOOD PRESSURE: 138 MMHG | OXYGEN SATURATION: 99 %

## 2024-07-18 DIAGNOSIS — S61.259S DOG BITE OF FINGER, SEQUELA: Primary | ICD-10-CM

## 2024-07-18 DIAGNOSIS — M65.9 TENOSYNOVITIS OF FINGER: ICD-10-CM
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
## 2024-07-18 DIAGNOSIS — R20.0 FINGER NUMBNESS: ICD-10-CM

## 2024-07-18 DIAGNOSIS — M25.642 FINGER STIFFNESS, LEFT: ICD-10-CM

## 2024-07-18 PROCEDURE — 99213 OFFICE O/P EST LOW 20 MIN: CPT | Performed by: FAMILY MEDICINE

## 2024-07-18 PROCEDURE — 3078F DIAST BP <80 MM HG: CPT | Performed by: FAMILY MEDICINE

## 2024-07-18 PROCEDURE — G8420 CALC BMI NORM PARAMETERS: HCPCS | Performed by: FAMILY MEDICINE

## 2024-07-18 PROCEDURE — G8427 DOCREV CUR MEDS BY ELIG CLIN: HCPCS | Performed by: FAMILY MEDICINE

## 2024-07-18 PROCEDURE — 1123F ACP DISCUSS/DSCN MKR DOCD: CPT | Performed by: FAMILY MEDICINE

## 2024-07-18 PROCEDURE — 3075F SYST BP GE 130 - 139MM HG: CPT | Performed by: FAMILY MEDICINE

## 2024-07-18 PROCEDURE — 3017F COLORECTAL CA SCREEN DOC REV: CPT | Performed by: FAMILY MEDICINE

## 2024-07-18 PROCEDURE — 4004F PT TOBACCO SCREEN RCVD TLK: CPT | Performed by: FAMILY MEDICINE

## 2024-07-18 RX ORDER — AMOXICILLIN AND CLAVULANATE POTASSIUM 875; 125 MG/1; MG/1
1 TABLET, FILM COATED ORAL 2 TIMES DAILY
Qty: 30 TABLET | Refills: 0 | Status: SHIPPED | OUTPATIENT
Start: 2024-07-18 | End: 2024-08-02

## 2024-07-18 SDOH — ECONOMIC STABILITY: FOOD INSECURITY: WITHIN THE PAST 12 MONTHS, YOU WORRIED THAT YOUR FOOD WOULD RUN OUT BEFORE YOU GOT MONEY TO BUY MORE.: NEVER TRUE

## 2024-07-18 SDOH — ECONOMIC STABILITY: FOOD INSECURITY: WITHIN THE PAST 12 MONTHS, THE FOOD YOU BOUGHT JUST DIDN'T LAST AND YOU DIDN'T HAVE MONEY TO GET MORE.: NEVER TRUE

## 2024-07-18 SDOH — ECONOMIC STABILITY: INCOME INSECURITY: HOW HARD IS IT FOR YOU TO PAY FOR THE VERY BASICS LIKE FOOD, HOUSING, MEDICAL CARE, AND HEATING?: NOT HARD AT ALL

## 2024-07-18 ASSESSMENT — PATIENT HEALTH QUESTIONNAIRE - PHQ9
2. FEELING DOWN, DEPRESSED OR HOPELESS: NOT AT ALL
SUM OF ALL RESPONSES TO PHQ QUESTIONS 1-9: 0
SUM OF ALL RESPONSES TO PHQ9 QUESTIONS 1 & 2: 0
1. LITTLE INTEREST OR PLEASURE IN DOING THINGS: NOT AT ALL

## 2024-07-18 NOTE — PROGRESS NOTES
Ja Gamble is a 74 y.o. male  Chief Complaint   Patient presents with    swollen finger     Swollen left pinky finger       HPI:  Symptoms include L small finger numbness and stiffness. Was doing well after dog bite injury in March 2024, but in early JUL 2024 noticed gradually worsening stiffness, swelling, and weaknes of the L small finger. No d/c, no open wound. Evaluation to date: seen in ER, had xrays. Treatment to date: abx in past.    Reviewed PMH, PSH, SH, Medications, allergies (see chart).  Current Outpatient Medications   Medication Sig    triamcinolone (KENALOG) 0.1 % cream APPLY A THIN LAYER TO AFFECTED AREA TWICE DAILY    traMADol (ULTRAM) 50 MG tablet Take 1 tablet by mouth every 8 hours as needed for Pain for up to 90 days. TID Max Daily Amount: 150 mg    varenicline (CHANTIX) 1 MG tablet Take 0.5-1 tablets by mouth 2 times daily    dilTIAZem (CARDIZEM CD) 180 MG extended release capsule TAKE 1 CAPSULE EVERY DAY FOR PRESSURE AND PULSE    albuterol sulfate HFA (VENTOLIN HFA) 108 (90 Base) MCG/ACT inhaler Inhale 2 puffs into the lungs 4 times daily as needed for Wheezing    DULoxetine (CYMBALTA) 60 MG extended release capsule Indications: Nerves and pain TAKE 1 CAPSULE EVERY DAY FOR NERVE PAIN  Indications: joint pain and nerve pain    lisinopril-hydroCHLOROthiazide (PRINZIDE;ZESTORETIC) 20-12.5 MG per tablet TAKE 1 TABLET EVERY DAY (REPLACES PLAIN LISINOPRIL).    atorvastatin (LIPITOR) 20 MG tablet TAKE 1 TABLET EVERY DAY FOR CHOLESTEROL AND HEART    aspirin 81 MG EC tablet Take 1 tablet by mouth daily     No current facility-administered medications for this visit.       ROS:   General: No fever, chills, or abnormal weight loss  Respiratory: No cough, dyspnea  CV: No chest pain, palpitations    Objective:  Visit Vitals  /68   Pulse 82   Temp 97.7 °F (36.5 °C) (Temporal)   Resp 18   Ht 1.753 m (5' 9\")   Wt 68.4 kg (150 lb 12.8 oz)   SpO2 99%   BMI 22.27 kg/m²     Wt Readings from Last 3 
relationship with someone who physically or mentally threatens you? N   Is it safe for you to go home? Y       Advance Care Planning     The patient has appointed the following active healthcare agents:    Primary Decision Maker: Britni Gamble - Spouse - [phone]

## 2024-07-22 ENCOUNTER — HOSPITAL ENCOUNTER (OUTPATIENT)
Facility: HOSPITAL | Age: 74
Discharge: HOME | End: 2024-07-25
Payer: MEDICARE
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
## 2024-07-22 DIAGNOSIS — R20.0 FINGER NUMBNESS: ICD-10-CM

## 2024-07-22 DIAGNOSIS — M25.642 FINGER STIFFNESS, LEFT: ICD-10-CM

## 2024-07-22 DIAGNOSIS — S61.259S DOG BITE OF FINGER, SEQUELA: ICD-10-CM

## 2024-07-22 PROCEDURE — 73140 X-RAY EXAM OF FINGER(S): CPT

## 2024-08-07 DIAGNOSIS — M51.36 DDD (DEGENERATIVE DISC DISEASE), LUMBAR: ICD-10-CM

## 2024-08-07 DIAGNOSIS — M47.816 LUMBAR SPONDYLOSIS: ICD-10-CM

## 2024-08-09 RX ORDER — DULOXETIN HYDROCHLORIDE 60 MG/1
CAPSULE, DELAYED RELEASE ORAL
Qty: 90 CAPSULE | Refills: 3 | Status: SHIPPED | OUTPATIENT
Start: 2024-08-09

## 2024-08-09 RX ORDER — ATORVASTATIN CALCIUM 20 MG/1
TABLET, FILM COATED ORAL
Qty: 90 TABLET | Refills: 3 | Status: SHIPPED | OUTPATIENT
Start: 2024-08-09

## 2024-08-09 RX ORDER — LISINOPRIL AND HYDROCHLOROTHIAZIDE 20; 12.5 MG/1; MG/1
TABLET ORAL
Qty: 90 TABLET | Refills: 3 | Status: SHIPPED | OUTPATIENT
Start: 2024-08-09

## 2024-09-18 ENCOUNTER — OFFICE VISIT (OUTPATIENT)
Age: 74
End: 2024-09-18
Payer: MEDICARE

## 2024-09-18 VITALS
BODY MASS INDEX: 21.92 KG/M2 | HEIGHT: 69 IN | DIASTOLIC BLOOD PRESSURE: 76 MMHG | TEMPERATURE: 98.4 F | OXYGEN SATURATION: 100 % | HEART RATE: 83 BPM | WEIGHT: 148 LBS | RESPIRATION RATE: 18 BRPM | SYSTOLIC BLOOD PRESSURE: 132 MMHG

## 2024-09-18 DIAGNOSIS — Z51.81 THERAPEUTIC DRUG MONITORING: ICD-10-CM

## 2024-09-18 DIAGNOSIS — I25.10 ASCVD (ARTERIOSCLEROTIC CARDIOVASCULAR DISEASE): ICD-10-CM

## 2024-09-18 DIAGNOSIS — Z23 ENCOUNTER FOR IMMUNIZATION: ICD-10-CM

## 2024-09-18 DIAGNOSIS — M51.36 DDD (DEGENERATIVE DISC DISEASE), LUMBAR: ICD-10-CM
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
## 2024-09-18 DIAGNOSIS — E78.00 PURE HYPERCHOLESTEROLEMIA: ICD-10-CM

## 2024-09-18 DIAGNOSIS — M47.816 LUMBAR SPONDYLOSIS: ICD-10-CM

## 2024-09-18 DIAGNOSIS — I10 PRIMARY HYPERTENSION: Primary | ICD-10-CM

## 2024-09-18 LAB
ALBUMIN SERPL-MCNC: 4.1 G/DL (ref 3.5–5)
ALBUMIN/GLOB SERPL: 1.3 (ref 1.1–2.2)
ALP SERPL-CCNC: 104 U/L (ref 45–117)
ALT SERPL-CCNC: 18 U/L (ref 12–78)
ANION GAP SERPL CALC-SCNC: 3 MMOL/L (ref 2–12)
AST SERPL-CCNC: 10 U/L (ref 15–37)
BILIRUB SERPL-MCNC: 0.3 MG/DL (ref 0.2–1)
BUN SERPL-MCNC: 17 MG/DL (ref 6–20)
BUN/CREAT SERPL: 18 (ref 12–20)
CALCIUM SERPL-MCNC: 9.7 MG/DL (ref 8.5–10.1)
CHLORIDE SERPL-SCNC: 105 MMOL/L (ref 97–108)
CHOLEST SERPL-MCNC: 189 MG/DL
CO2 SERPL-SCNC: 31 MMOL/L (ref 21–32)
CREAT SERPL-MCNC: 0.93 MG/DL (ref 0.7–1.3)
GLOBULIN SER CALC-MCNC: 3.1 G/DL (ref 2–4)
GLUCOSE SERPL-MCNC: 78 MG/DL (ref 65–100)
HDLC SERPL-MCNC: 56 MG/DL
HDLC SERPL: 3.4 (ref 0–5)
LDLC SERPL CALC-MCNC: 118 MG/DL (ref 0–100)
POTASSIUM SERPL-SCNC: 3.8 MMOL/L (ref 3.5–5.1)
PROT SERPL-MCNC: 7.2 G/DL (ref 6.4–8.2)
SODIUM SERPL-SCNC: 139 MMOL/L (ref 136–145)
TRIGL SERPL-MCNC: 75 MG/DL
VLDLC SERPL CALC-MCNC: 15 MG/DL

## 2024-09-18 PROCEDURE — 3075F SYST BP GE 130 - 139MM HG: CPT | Performed by: FAMILY MEDICINE

## 2024-09-18 PROCEDURE — 3017F COLORECTAL CA SCREEN DOC REV: CPT | Performed by: FAMILY MEDICINE

## 2024-09-18 PROCEDURE — G8427 DOCREV CUR MEDS BY ELIG CLIN: HCPCS | Performed by: FAMILY MEDICINE

## 2024-09-18 PROCEDURE — 3078F DIAST BP <80 MM HG: CPT | Performed by: FAMILY MEDICINE

## 2024-09-18 PROCEDURE — 99214 OFFICE O/P EST MOD 30 MIN: CPT | Performed by: FAMILY MEDICINE

## 2024-09-18 PROCEDURE — 1123F ACP DISCUSS/DSCN MKR DOCD: CPT | Performed by: FAMILY MEDICINE

## 2024-09-18 PROCEDURE — G8420 CALC BMI NORM PARAMETERS: HCPCS | Performed by: FAMILY MEDICINE

## 2024-09-18 PROCEDURE — G0008 ADMIN INFLUENZA VIRUS VAC: HCPCS | Performed by: FAMILY MEDICINE

## 2024-09-18 PROCEDURE — 90653 IIV ADJUVANT VACCINE IM: CPT | Performed by: FAMILY MEDICINE

## 2024-09-18 PROCEDURE — 4004F PT TOBACCO SCREEN RCVD TLK: CPT | Performed by: FAMILY MEDICINE

## 2024-09-18 RX ORDER — TRAMADOL HYDROCHLORIDE 50 MG/1
50 TABLET ORAL EVERY 8 HOURS PRN
Qty: 90 TABLET | Refills: 2 | Status: SHIPPED | OUTPATIENT
Start: 2024-11-15 | End: 2025-02-13

## 2024-09-18 SDOH — ECONOMIC STABILITY: FOOD INSECURITY: WITHIN THE PAST 12 MONTHS, THE FOOD YOU BOUGHT JUST DIDN'T LAST AND YOU DIDN'T HAVE MONEY TO GET MORE.: NEVER TRUE

## 2024-09-18 SDOH — ECONOMIC STABILITY: FOOD INSECURITY: WITHIN THE PAST 12 MONTHS, YOU WORRIED THAT YOUR FOOD WOULD RUN OUT BEFORE YOU GOT MONEY TO BUY MORE.: NEVER TRUE

## 2024-09-18 SDOH — ECONOMIC STABILITY: INCOME INSECURITY: HOW HARD IS IT FOR YOU TO PAY FOR THE VERY BASICS LIKE FOOD, HOUSING, MEDICAL CARE, AND HEATING?: NOT HARD AT ALL

## 2024-09-18 ASSESSMENT — PATIENT HEALTH QUESTIONNAIRE - PHQ9
SUM OF ALL RESPONSES TO PHQ QUESTIONS 1-9: 0
SUM OF ALL RESPONSES TO PHQ9 QUESTIONS 1 & 2: 0
2. FEELING DOWN, DEPRESSED OR HOPELESS: NOT AT ALL
SUM OF ALL RESPONSES TO PHQ QUESTIONS 1-9: 0
SUM OF ALL RESPONSES TO PHQ QUESTIONS 1-9: 0
1. LITTLE INTEREST OR PLEASURE IN DOING THINGS: NOT AT ALL
SUM OF ALL RESPONSES TO PHQ QUESTIONS 1-9: 0

## 2024-09-20 LAB
AMPHETAMINES UR QL SCN: NEGATIVE NG/ML
BARBITURATES UR QL SCN: NEGATIVE NG/ML
BENZODIAZ UR QL SCN: NEGATIVE NG/ML
BZE UR QL SCN: NEGATIVE NG/ML
CANNABINOIDS UR QL SCN: NEGATIVE NG/ML
CREAT UR-MCNC: 44.2 MG/DL (ref 20–300)
LABORATORY COMMENT REPORT: NORMAL
METHADONE UR QL SCN: NEGATIVE NG/ML
OPIATES UR QL SCN: NEGATIVE NG/ML
OXYCODONE+OXYMORPHONE UR QL SCN: NEGATIVE NG/ML
PCP UR QL: NEGATIVE NG/ML
PH UR: 7.4 (ref 4.5–8.9)
PROPOXYPH UR QL SCN: NEGATIVE NG/ML

## 2024-10-10 ENCOUNTER — PATIENT MESSAGE (OUTPATIENT)
Age: 74
End: 2024-10-10

## 2024-10-10 DIAGNOSIS — M51.369 DDD (DEGENERATIVE DISC DISEASE), LUMBAR: ICD-10-CM

## 2024-10-11 RX ORDER — PREDNISONE 20 MG/1
TABLET ORAL
Qty: 18 TABLET | Refills: 0 | Status: SHIPPED | OUTPATIENT
Start: 2024-10-11 | End: 2024-10-19

## 2025-01-13 ENCOUNTER — OFFICE VISIT (OUTPATIENT)
Age: 75
End: 2025-01-13
Payer: MEDICARE

## 2025-01-13 VITALS
RESPIRATION RATE: 18 BRPM | SYSTOLIC BLOOD PRESSURE: 146 MMHG | BODY MASS INDEX: 22.13 KG/M2 | OXYGEN SATURATION: 98 % | HEART RATE: 70 BPM | HEIGHT: 69 IN | TEMPERATURE: 97.9 F | WEIGHT: 149.4 LBS | DIASTOLIC BLOOD PRESSURE: 59 MMHG

## 2025-01-13 DIAGNOSIS — Z00.00 MEDICARE ANNUAL WELLNESS VISIT, SUBSEQUENT: Primary | ICD-10-CM

## 2025-01-13 PROCEDURE — 1159F MED LIST DOCD IN RCRD: CPT | Performed by: FAMILY MEDICINE

## 2025-01-13 PROCEDURE — 1125F AMNT PAIN NOTED PAIN PRSNT: CPT | Performed by: FAMILY MEDICINE

## 2025-01-13 PROCEDURE — 1123F ACP DISCUSS/DSCN MKR DOCD: CPT | Performed by: FAMILY MEDICINE

## 2025-01-13 PROCEDURE — 3077F SYST BP >= 140 MM HG: CPT | Performed by: FAMILY MEDICINE

## 2025-01-13 PROCEDURE — 1160F RVW MEDS BY RX/DR IN RCRD: CPT | Performed by: FAMILY MEDICINE

## 2025-01-13 PROCEDURE — G0439 PPPS, SUBSEQ VISIT: HCPCS | Performed by: FAMILY MEDICINE

## 2025-01-13 PROCEDURE — 3078F DIAST BP <80 MM HG: CPT | Performed by: FAMILY MEDICINE

## 2025-01-13 PROCEDURE — 3017F COLORECTAL CA SCREEN DOC REV: CPT | Performed by: FAMILY MEDICINE

## 2025-01-13 SDOH — ECONOMIC STABILITY: FOOD INSECURITY: WITHIN THE PAST 12 MONTHS, THE FOOD YOU BOUGHT JUST DIDN'T LAST AND YOU DIDN'T HAVE MONEY TO GET MORE.: OFTEN TRUE

## 2025-01-13 SDOH — ECONOMIC STABILITY: TRANSPORTATION INSECURITY
IN THE PAST 12 MONTHS, HAS LACK OF TRANSPORTATION KEPT YOU FROM MEETINGS, WORK, OR FROM GETTING THINGS NEEDED FOR DAILY LIVING?: NO

## 2025-01-13 SDOH — ECONOMIC STABILITY: FOOD INSECURITY: WITHIN THE PAST 12 MONTHS, YOU WORRIED THAT YOUR FOOD WOULD RUN OUT BEFORE YOU GOT MONEY TO BUY MORE.: OFTEN TRUE

## 2025-01-13 SDOH — ECONOMIC STABILITY: INCOME INSECURITY: IN THE LAST 12 MONTHS, WAS THERE A TIME WHEN YOU WERE NOT ABLE TO PAY THE MORTGAGE OR RENT ON TIME?: NO

## 2025-01-13 SDOH — ECONOMIC STABILITY: TRANSPORTATION INSECURITY
IN THE PAST 12 MONTHS, HAS THE LACK OF TRANSPORTATION KEPT YOU FROM MEDICAL APPOINTMENTS OR FROM GETTING MEDICATIONS?: NO

## 2025-01-13 SDOH — HEALTH STABILITY: PHYSICAL HEALTH: ON AVERAGE, HOW MANY DAYS PER WEEK DO YOU ENGAGE IN MODERATE TO STRENUOUS EXERCISE (LIKE A BRISK WALK)?: 4 DAYS

## 2025-01-13 ASSESSMENT — LIFESTYLE VARIABLES
HOW OFTEN DO YOU HAVE A DRINK CONTAINING ALCOHOL: NEVER
HOW OFTEN DO YOU HAVE A DRINK CONTAINING ALCOHOL: 1
HOW MANY STANDARD DRINKS CONTAINING ALCOHOL DO YOU HAVE ON A TYPICAL DAY: PATIENT DOES NOT DRINK
HOW MANY STANDARD DRINKS CONTAINING ALCOHOL DO YOU HAVE ON A TYPICAL DAY: 0
HOW OFTEN DO YOU HAVE SIX OR MORE DRINKS ON ONE OCCASION: 1

## 2025-01-13 ASSESSMENT — PATIENT HEALTH QUESTIONNAIRE - PHQ9
1. LITTLE INTEREST OR PLEASURE IN DOING THINGS: SEVERAL DAYS
2. FEELING DOWN, DEPRESSED OR HOPELESS: SEVERAL DAYS
SUM OF ALL RESPONSES TO PHQ9 QUESTIONS 1 & 2: 2
SUM OF ALL RESPONSES TO PHQ QUESTIONS 1-9: 2

## 2025-01-13 NOTE — PROGRESS NOTES
Ja Gamble is a 74 y.o. male presenting for/with:    Chief Complaint   Patient presents with    Medicare AWV       Vitals:    01/13/25 0900   BP: (!) 146/59   Pulse: 70   Resp: 18   Temp: 97.9 °F (36.6 °C)   TempSrc: Temporal   SpO2: 98%   Weight: 67.8 kg (149 lb 6.4 oz)   Height: 1.753 m (5' 9\")       Pain Scale: 6/10  Pain Location: Hip    \"Have you been to the ER, urgent care clinic since your last visit?  Hospitalized since your last visit?\"    NO    “Have you seen or consulted any other health care providers outside of [facility] since your last visit?”    NO                 1/13/2025     7:18 AM   PHQ-9    Little interest or pleasure in doing things 1   Feeling down, depressed, or hopeless 1   PHQ-2 Score 2   PHQ-9 Total Score 2           3/13/2024    10:30 AM 6/27/2023     8:10 AM 5/26/2023    11:10 AM 10/7/2022    12:00 AM 2/22/2022    12:00 AM 11/23/2021    12:00 AM 5/25/2021    12:00 AM   [facility] AMB LEARNING ASSESSMENT   Primary Learner Patient Patient Patient Patient Patient Patient Patient   Primary Language ENGLISH ENGLISH ENGLISH ENGLISH ENGLISH ENGLISH ENGLISH   Learning Preference DEMONSTRATION READING READING    LISTENING DEMONSTRATION READING READING READING   Answered By patient self patient patient Patient Patient patient   Relationship to Learner SELF SELF SELF SELF SELF SELF SELF            1/13/2025     7:18 AM   Amb Fall Risk Assessment and TUG Test   Do you feel unsteady or are you worried about falling?  no   2 or more falls in past year? no   Fall with injury in past year? no           1/13/2025     9:00 AM 9/18/2024     9:00 AM 7/18/2024     8:00 AM 6/21/2024    10:00 AM 5/28/2024     3:00 PM 3/27/2024    11:00 AM 3/13/2024    10:00 AM   ADL ASSESSMENT   Feeding yourself No Help Needed No Help Needed No Help Needed No Help Needed No Help Needed No Help Needed No Help Needed   Getting from bed to chair No Help Needed No Help Needed No Help Needed No Help Needed No Help

## 2025-01-13 NOTE — PROGRESS NOTES
Medicare Annual Wellness Visit    Ja Gamble is here for Medicare AWV    Assessment & Plan   Medicare annual wellness visit, subsequent     HCM:  Colon ca screening: FIT NEG 7/2024.  COVIDvax, RSV, flu shot recommended. Pt will consider.    MAKEDA  Saw audio. Rec hearing aides, but was too expensive. Monitor.    Return in 1 year (on 1/13/2026) for Medicare AWV.     Subjective     Patient's complete Health Risk Assessment and screening values have been reviewed and are found in Flowsheets. The following problems were reviewed today and where indicated follow up appointments were made and/or referrals ordered.    Positive Risk Factor Screenings with Interventions:          Controlled Medication Review:    Today's Pain Level: Pain Score: SIX     Opioid Risk: (Low risk score <55) Opioid risk score: 25    Patient is low risk for opioid use disorder or overdose.    Last PDMP Jani as Reviewed:  Review User Review Instant Review Result   LUZ MARINA RAMOS 9/18/2024  9:54 AM     Reviewed PDMP [1]           Poor Eating Habits/Diet:  Do you eat balanced/healthy meals regularly?: (!) No  Interventions:  Patient advised to follow-up in this office for further evaluation and treatment           Tobacco Use:    Tobacco Use      Smoking status: Some Days        Packs/day: 0.50        Years: 0.5 packs/day for 59.9 years (30.0 ttl pk-yrs)        Types: Cigarettes        Start date: 2/2/1965      Smokeless tobacco: Never     Interventions:  Patient advised to follow up in the office for further evalution and treatment        Objective   Vitals:    01/13/25 0900   BP: (!) 146/59   Pulse: 70   Resp: 18   Temp: 97.9 °F (36.6 °C)   TempSrc: Temporal   SpO2: 98%   Weight: 67.8 kg (149 lb 6.4 oz)   Height: 1.753 m (5' 9\")      Body mass index is 22.06 kg/m².     Physical Examination: General appearance - alert, well appearing, and in no distress  Mental status - alert, oriented to person, place, and time  Eyes - pupils equal and

## 2025-01-13 NOTE — PATIENT INSTRUCTIONS
pain reliever, such as acetaminophen (Tylenol).   When should you call for help?   Call 911 if you have symptoms of a heart attack. These may include:    Chest pain or pressure, or a strange feeling in the chest.     Sweating.     Shortness of breath.     Pain, pressure, or a strange feeling in the back, neck, jaw, or upper belly or in one or both shoulders or arms.     Lightheadedness or sudden weakness.     A fast or irregular heartbeat.   After you call 911, the  may tell you to chew 1 adult-strength or 2 to 4 low-dose aspirin. Wait for an ambulance. Do not try to drive yourself.  Watch closely for changes in your health, and be sure to contact your doctor if you have any problems.  Where can you learn more?  Go to https://www.[employer].net/patientEd and enter F075 to learn more about \"A Healthy Heart: Care Instructions.\"  Current as of: July 31, 2024  Content Version: 14.3  © 2024 [employer].   Care instructions adapted under license by [employer]. If you have questions about a medical condition or this instruction, always ask your healthcare professional. [facility], [facility] [employer] [facility]), disclaims any warranty or liability for your use of this information.    Personalized Preventive Plan for Ja Gamble - 1/13/2025  Medicare offers a range of preventive health benefits. Some of the tests and screenings are paid in full while other may be subject to a deductible, co-insurance, and/or copay.  Some of these benefits include a comprehensive review of your medical history including lifestyle, illnesses that may run in your family, and various assessments and screenings as appropriate.  After reviewing your medical record and screening and assessments performed today your provider may have ordered immunizations, labs, imaging, and/or referrals for you.  A list of these orders (if applicable) as well as your Preventive Care list are included within your After Visit Summary for your review.

## 2025-01-22 DIAGNOSIS — M47.816 LUMBAR SPONDYLOSIS: ICD-10-CM

## 2025-01-22 DIAGNOSIS — M51.369 DDD (DEGENERATIVE DISC DISEASE), LUMBAR: ICD-10-CM

## 2025-01-22 RX ORDER — TRAMADOL HYDROCHLORIDE 50 MG/1
50 TABLET ORAL EVERY 8 HOURS PRN
Qty: 90 TABLET | Refills: 2 | Status: SHIPPED | OUTPATIENT
Start: 2025-01-22 | End: 2025-01-24 | Stop reason: SDUPTHER

## 2025-01-22 NOTE — TELEPHONE ENCOUNTER
Wife called stating there was a mix up with the pt's insurance and wants to know if his Tramadol can be sent in to [facility] in [place] today.     Thanks!

## 2025-01-23 ENCOUNTER — TELEPHONE (OUTPATIENT)
Age: 75
End: 2025-01-23

## 2025-01-23 DIAGNOSIS — M51.369 DDD (DEGENERATIVE DISC DISEASE), LUMBAR: ICD-10-CM

## 2025-01-23 DIAGNOSIS — M47.816 LUMBAR SPONDYLOSIS: ICD-10-CM

## 2025-01-23 NOTE — TELEPHONE ENCOUNTER
Wife called stating [facility] yesterday, but because of the [facility] issue he is having, [facility]. Wife would like medication sent to [facility], as they will accept RX card and the cost will be $25. I did advise that Robin is not in the office today but that possibly another provider could take care of this; however, I could not guarantee this.     Thanks!

## 2025-01-23 NOTE — TELEPHONE ENCOUNTER
Patient and wife recalled. Explained that since medication is a controlled substance, PCP prefer to prescribe personally. Suggested patient/wife speak to [facility] about a partial affordable fill until PCP returned tomorrow. Wife verbalized understanding.

## 2025-01-24 RX ORDER — TRAMADOL HYDROCHLORIDE 50 MG/1
50 TABLET ORAL EVERY 8 HOURS PRN
Qty: 90 TABLET | Refills: 2 | Status: SHIPPED | OUTPATIENT
Start: 2025-01-24 | End: 2025-04-24

## 2025-02-24 ENCOUNTER — OFFICE VISIT (OUTPATIENT)
Age: 75
End: 2025-02-24
Payer: MEDICARE

## 2025-02-24 VITALS
SYSTOLIC BLOOD PRESSURE: 116 MMHG | DIASTOLIC BLOOD PRESSURE: 47 MMHG | WEIGHT: 149.4 LBS | OXYGEN SATURATION: 97 % | RESPIRATION RATE: 18 BRPM | HEART RATE: 76 BPM | BODY MASS INDEX: 22.13 KG/M2 | HEIGHT: 69 IN

## 2025-02-24 DIAGNOSIS — M51.362 DEGENERATION OF INTERVERTEBRAL DISC OF LUMBAR REGION WITH DISCOGENIC BACK PAIN AND LOWER EXTREMITY PAIN: ICD-10-CM

## 2025-02-24 DIAGNOSIS — I10 PRIMARY HYPERTENSION: Primary | ICD-10-CM

## 2025-02-24 DIAGNOSIS — M47.816 LUMBAR SPONDYLOSIS: ICD-10-CM

## 2025-02-24 DIAGNOSIS — I25.10 ASCVD (ARTERIOSCLEROTIC CARDIOVASCULAR DISEASE): ICD-10-CM

## 2025-02-24 DIAGNOSIS — M51.369 DEGENERATION OF INTERVERTEBRAL DISC OF LUMBAR REGION WITHOUT DISCOGENIC BACK PAIN OR LOWER EXTREMITY PAIN: ICD-10-CM

## 2025-02-24 PROCEDURE — 1123F ACP DISCUSS/DSCN MKR DOCD: CPT | Performed by: FAMILY MEDICINE

## 2025-02-24 PROCEDURE — G8420 CALC BMI NORM PARAMETERS: HCPCS | Performed by: FAMILY MEDICINE

## 2025-02-24 PROCEDURE — 99214 OFFICE O/P EST MOD 30 MIN: CPT | Performed by: FAMILY MEDICINE

## 2025-02-24 PROCEDURE — 4004F PT TOBACCO SCREEN RCVD TLK: CPT | Performed by: FAMILY MEDICINE

## 2025-02-24 PROCEDURE — 1126F AMNT PAIN NOTED NONE PRSNT: CPT | Performed by: FAMILY MEDICINE

## 2025-02-24 PROCEDURE — 3078F DIAST BP <80 MM HG: CPT | Performed by: FAMILY MEDICINE

## 2025-02-24 PROCEDURE — 3074F SYST BP LT 130 MM HG: CPT | Performed by: FAMILY MEDICINE

## 2025-02-24 PROCEDURE — 1159F MED LIST DOCD IN RCRD: CPT | Performed by: FAMILY MEDICINE

## 2025-02-24 PROCEDURE — G8427 DOCREV CUR MEDS BY ELIG CLIN: HCPCS | Performed by: FAMILY MEDICINE

## 2025-02-24 PROCEDURE — 3017F COLORECTAL CA SCREEN DOC REV: CPT | Performed by: FAMILY MEDICINE

## 2025-02-24 RX ORDER — LISINOPRIL 40 MG/1
40 TABLET ORAL DAILY
Qty: 90 TABLET | Refills: 3 | Status: SHIPPED | OUTPATIENT
Start: 2025-02-24

## 2025-02-24 RX ORDER — TRAMADOL HYDROCHLORIDE 50 MG/1
50 TABLET ORAL EVERY 8 HOURS PRN
Qty: 90 TABLET | Refills: 2 | Status: SHIPPED | OUTPATIENT
Start: 2025-02-24 | End: 2025-05-25

## 2025-02-24 ASSESSMENT — PATIENT HEALTH QUESTIONNAIRE - PHQ9
SUM OF ALL RESPONSES TO PHQ QUESTIONS 1-9: 0
SUM OF ALL RESPONSES TO PHQ QUESTIONS 1-9: 0
2. FEELING DOWN, DEPRESSED OR HOPELESS: NOT AT ALL
SUM OF ALL RESPONSES TO PHQ9 QUESTIONS 1 & 2: 0
SUM OF ALL RESPONSES TO PHQ QUESTIONS 1-9: 0
SUM OF ALL RESPONSES TO PHQ QUESTIONS 1-9: 0
1. LITTLE INTEREST OR PLEASURE IN DOING THINGS: NOT AT ALL

## 2025-02-24 NOTE — PROGRESS NOTES
Ja Gamble is a 74 y.o. male presenting for/with:    Chief Complaint   Patient presents with    Medication Refill     Requests tramadol prescription be changed from local pharmacy to [facility]    Erectile Dysfunction     Does not want to discuss with nurse. States it is a private matter       Vitals:    02/24/25 0958   BP: (!) 116/47   Site: Left Upper Arm   Position: Sitting   Cuff Size: Medium Adult   Pulse: 76   Resp: 18   SpO2: 97%   Weight: 67.8 kg (149 lb 6.4 oz)   Height: 1.753 m (5' 9\")       Pain Scale: 0 - No pain/10  Pain Location:     \"Have you been to the ER, urgent care clinic since your last visit?  Hospitalized since your last visit?\"    NO    “Have you seen or consulted any other health care providers outside of [facility] since your last visit?”    NO               2/24/2025     9:54 AM   PHQ-9    Little interest or pleasure in doing things 0   Feeling down, depressed, or hopeless 0   PHQ-2 Score 0   PHQ-9 Total Score 0           3/13/2024    10:30 AM 6/27/2023     8:10 AM 5/26/2023    11:10 AM 10/7/2022    12:00 AM 2/22/2022    12:00 AM 11/23/2021    12:00 AM 5/25/2021    12:00 AM   [facility] AMB LEARNING ASSESSMENT   Primary Learner Patient Patient Patient Patient Patient Patient Patient   Primary Language ENGLISH ENGLISH ENGLISH ENGLISH ENGLISH ENGLISH ENGLISH   Learning Preference DEMONSTRATION READING READING    LISTENING DEMONSTRATION READING READING READING   Answered By patient self patient patient Patient Patient patient   Relationship to Learner SELF SELF SELF SELF SELF SELF SELF            2/24/2025     9:55 AM   Amb Fall Risk Assessment and TUG Test   Do you feel unsteady or are you worried about falling?  no   2 or more falls in past year? no   Fall with injury in past year? no           2/24/2025     9:00 AM 1/13/2025     9:00 AM 9/18/2024     9:00 AM 7/18/2024     8:00 AM 6/21/2024    10:00 AM 5/28/2024     3:00 PM 3/27/2024    11:00 AM   ADL ASSESSMENT   Feeding

## 2025-02-24 NOTE — PATIENT INSTRUCTIONS
Levitra, Cialis, and Muse suppositories all can be effective to help with ED. Also get smoke free ASAP.

## 2025-02-24 NOTE — PROGRESS NOTES
Ja Gamble is a 74 y.o. male  Chief Complaint   Patient presents with    Medication Refill     Requests tramadol prescription be changed from local pharmacy to [facility]    Erectile Dysfunction     Does not want to discuss with nurse. States it is a private matter     HPI:  ED  Gradually worsening. Tried viagra 25mg in past week, but got blurry vision, felt weak. Still smoking some. Urged to get quit.     DJD L-spine, DJD hip, bursitis  Doing well for the most part since last visit on the tramadol 50 TID, having less discomfort over past few mo. Hip doing ok lately. Back was doing better, but then became more sore a couple weeks ago. No known trauma or overuse, no further carrying his dog around. Less able to perform his ADL's, more trouble sleeping. Has been taking tramadol, 2-3/d, denise well, but not getting good relief lately. Remains on cymbalta as adjunct for pain relief. Did good course of phys therapy in past, no help, not interested in more therapy at this time.  UDS good 3/2024.  reviewed, in goal.    Hypertension  Blood pressures good today. PT reports is taking meds regularly. Management at last visit included con't current tx. Current regimen: ACEI, calcium channel blocker, thiazide. Symptoms include no symptoms. Patient denies chest pain, palpitations, peripheral edema.  Lab review:   Lab Results   Component Value Date     09/18/2024    K 3.8 09/18/2024     09/18/2024    CO2 31 09/18/2024    GLUCOSE 78 09/18/2024    BUN 17 09/18/2024    CREATININE 0.93 09/18/2024     Hyperlipidemia  On lipitor 20. Denise well. No myalgias, arthralgias, unusual weakness.  Lab Results   Component Value Date    CHOL 189 09/18/2024    HDL 56 09/18/2024    TRIG 75 09/18/2024    AST 10 (L) 09/18/2024    ALT 18 09/18/2024    ALKPHOS 104 09/18/2024    BILITOT 0.3 09/18/2024     Smoking  Smoking less since last visit, but still smoking some. Was on chantix, but d/c it after getting some chest discomfort.

## 2025-03-05 DIAGNOSIS — M47.816 LUMBAR SPONDYLOSIS: Primary | ICD-10-CM

## 2025-03-05 RX ORDER — TRAMADOL HYDROCHLORIDE 50 MG/1
50 TABLET ORAL 3 TIMES DAILY PRN
Qty: 15 TABLET | Refills: 0 | Status: SHIPPED | OUTPATIENT
Start: 2025-03-05 | End: 2025-03-10

## 2025-03-05 NOTE — TELEPHONE ENCOUNTER
Continued Stay Note  [facility]     Patient Name: Caty Garcia  MRN: [id number]  Today's Date: 6/9/2017    Admit Date: 6/8/2017          Discharge Plan       06/09/17 1207    Case Management/Social Work Plan    Plan Received call from Gely at  states looks good but will have get approved by Medical Director and do precert, will need to know when pt is medically ready for discharge.        06/09/17 0901    Case Management/Social Work Plan    Plan Spoke to pt in room, per [place] feels will need short term rehab for more extensive wound care.  Lives in [place] but facitites do not accept her insurance.  Pt states she can wrap her leg but if needs much more would like to go to rehab.  Pt informed  [facility] facilities are out of network with insurance.  Ok with pt to fax to referral to [facility] and [facility].  [facility] , [facility]  and  to [facility].  Cm will continue to follow.               Discharge Codes     None        Expected Discharge Date and Time     Expected Discharge Date Expected Discharge Time    Jun 12, 2017             Penny Samuels     Mail ore hasn't sent the tramadol left. Pt all out. [place]  reviewed. Fill pattern in goal. Short fill ore to last until mail ore arrives.

## 2025-04-14 ENCOUNTER — OFFICE VISIT (OUTPATIENT)
Age: 75
End: 2025-04-14
Payer: MEDICARE

## 2025-04-14 VITALS
WEIGHT: 152 LBS | HEIGHT: 69 IN | SYSTOLIC BLOOD PRESSURE: 126 MMHG | RESPIRATION RATE: 18 BRPM | BODY MASS INDEX: 22.51 KG/M2 | OXYGEN SATURATION: 99 % | TEMPERATURE: 97.6 F | HEART RATE: 69 BPM | DIASTOLIC BLOOD PRESSURE: 60 MMHG

## 2025-04-14 DIAGNOSIS — M54.50 CHRONIC MIDLINE LOW BACK PAIN WITHOUT SCIATICA: ICD-10-CM

## 2025-04-14 DIAGNOSIS — M25.552 BILATERAL HIP PAIN: ICD-10-CM

## 2025-04-14 DIAGNOSIS — Z23 ENCOUNTER FOR IMMUNIZATION: ICD-10-CM

## 2025-04-14 DIAGNOSIS — M51.369 DEGENERATION OF INTERVERTEBRAL DISC OF LUMBAR REGION WITHOUT DISCOGENIC BACK PAIN OR LOWER EXTREMITY PAIN: ICD-10-CM

## 2025-04-14 DIAGNOSIS — E78.00 PURE HYPERCHOLESTEROLEMIA: ICD-10-CM

## 2025-04-14 DIAGNOSIS — M47.816 LUMBAR SPONDYLOSIS: ICD-10-CM

## 2025-04-14 DIAGNOSIS — Z51.81 THERAPEUTIC DRUG MONITORING: ICD-10-CM

## 2025-04-14 DIAGNOSIS — I10 PRIMARY HYPERTENSION: Primary | ICD-10-CM

## 2025-04-14 DIAGNOSIS — M25.551 BILATERAL HIP PAIN: ICD-10-CM

## 2025-04-14 DIAGNOSIS — F17.200 SMOKER: ICD-10-CM

## 2025-04-14 DIAGNOSIS — G89.29 CHRONIC MIDLINE LOW BACK PAIN WITHOUT SCIATICA: ICD-10-CM

## 2025-04-14 DIAGNOSIS — I10 PRIMARY HYPERTENSION: ICD-10-CM

## 2025-04-14 DIAGNOSIS — I25.10 ASCVD (ARTERIOSCLEROTIC CARDIOVASCULAR DISEASE): ICD-10-CM

## 2025-04-14 DIAGNOSIS — M51.362 DEGENERATION OF INTERVERTEBRAL DISC OF LUMBAR REGION WITH DISCOGENIC BACK PAIN AND LOWER EXTREMITY PAIN: ICD-10-CM

## 2025-04-14 PROCEDURE — 3017F COLORECTAL CA SCREEN DOC REV: CPT | Performed by: FAMILY MEDICINE

## 2025-04-14 PROCEDURE — 3074F SYST BP LT 130 MM HG: CPT | Performed by: FAMILY MEDICINE

## 2025-04-14 PROCEDURE — G8420 CALC BMI NORM PARAMETERS: HCPCS | Performed by: FAMILY MEDICINE

## 2025-04-14 PROCEDURE — 1123F ACP DISCUSS/DSCN MKR DOCD: CPT | Performed by: FAMILY MEDICINE

## 2025-04-14 PROCEDURE — 4004F PT TOBACCO SCREEN RCVD TLK: CPT | Performed by: FAMILY MEDICINE

## 2025-04-14 PROCEDURE — G8427 DOCREV CUR MEDS BY ELIG CLIN: HCPCS | Performed by: FAMILY MEDICINE

## 2025-04-14 PROCEDURE — 3078F DIAST BP <80 MM HG: CPT | Performed by: FAMILY MEDICINE

## 2025-04-14 PROCEDURE — 1160F RVW MEDS BY RX/DR IN RCRD: CPT | Performed by: FAMILY MEDICINE

## 2025-04-14 PROCEDURE — 1159F MED LIST DOCD IN RCRD: CPT | Performed by: FAMILY MEDICINE

## 2025-04-14 PROCEDURE — 1125F AMNT PAIN NOTED PAIN PRSNT: CPT | Performed by: FAMILY MEDICINE

## 2025-04-14 ASSESSMENT — PATIENT HEALTH QUESTIONNAIRE - PHQ9
SUM OF ALL RESPONSES TO PHQ QUESTIONS 1-9: 0
2. FEELING DOWN, DEPRESSED OR HOPELESS: NOT AT ALL
SUM OF ALL RESPONSES TO PHQ QUESTIONS 1-9: 0
SUM OF ALL RESPONSES TO PHQ QUESTIONS 1-9: 0
1. LITTLE INTEREST OR PLEASURE IN DOING THINGS: NOT AT ALL
SUM OF ALL RESPONSES TO PHQ QUESTIONS 1-9: 0

## 2025-04-14 NOTE — PROGRESS NOTES
Ja Gamble is a 74 y.o. male presenting for/with:    Chief Complaint   Patient presents with    Hypertension    Medication Check       Vitals:    04/14/25 0900   BP: 126/60   Pulse: 69   Resp: 18   Temp: 97.6 °F (36.4 °C)   TempSrc: Temporal   SpO2: 99%   Weight: 68.9 kg (152 lb)   Height: 1.753 m (5' 9\")       Pain Scale: 7/10  Pain Location: Hip (and lower back)    \"Have you been to the ER, urgent care clinic since your last visit?  Hospitalized since your last visit?\"    NO    “Have you seen or consulted any other health care providers outside of [facility] since your last visit?”    NO               4/14/2025     9:17 AM   PHQ-9    Little interest or pleasure in doing things 0   Feeling down, depressed, or hopeless 0   PHQ-2 Score 0   PHQ-9 Total Score 0           3/13/2024    10:30 AM 6/27/2023     8:10 AM 5/26/2023    11:10 AM 10/7/2022    12:00 AM 2/22/2022    12:00 AM 11/23/2021    12:00 AM 5/25/2021    12:00 AM   [facility] AMB LEARNING ASSESSMENT   Primary Learner Patient Patient Patient Patient Patient Patient Patient   Primary Language ENGLISH ENGLISH ENGLISH ENGLISH ENGLISH ENGLISH ENGLISH   Learning Preference DEMONSTRATION READING READING    LISTENING DEMONSTRATION READING READING READING   Answered By patient self patient patient Patient Patient patient   Relationship to Learner SELF SELF SELF SELF SELF SELF SELF            4/14/2025     9:17 AM   Amb Fall Risk Assessment and TUG Test   Do you feel unsteady or are you worried about falling?  no   2 or more falls in past year? no   Fall with injury in past year? no           4/14/2025     9:00 AM 2/24/2025     9:00 AM 1/13/2025     9:00 AM 9/18/2024     9:00 AM 7/18/2024     8:00 AM 6/21/2024    10:00 AM 5/28/2024     3:00 PM   ADL ASSESSMENT   Feeding yourself No Help Needed No Help Needed No Help Needed No Help Needed No Help Needed No Help Needed No Help Needed   Getting from bed to chair No Help Needed No Help Needed No Help Needed

## 2025-04-14 NOTE — PROGRESS NOTES
Ja Gamble is a 74 y.o. male  Chief Complaint   Patient presents with    Hypertension    Medication Check     HPI:  DJD L-spine, DJD hip, bursitis  Doing wores since last visit. More achy to hips and low back, no trauma or activity changes. Feeling more stiff, sore. Not getting good relief from tramadol 50 TID. Not particularly related to cooler weather. Less able to perform his ADL's, more trouble sleeping. Remains on cymbalta as adjunct for pain relief. Did good course of phys therapy in past, no help, not interested in more therapy at this time.  UDS good 9/2024.  reviewed, in goal.  No results found for: \"FABIAN\", \"SEDRATE\"      Hypertension  Blood pressures good today. PT reports is taking meds regularly. Management at last visit included con't current tx. Current regimen: ACEI, calcium channel blocker, thiazide. Symptoms include no symptoms. Patient denies chest pain, palpitations, peripheral edema.  Lab review:   Lab Results   Component Value Date     09/18/2024    K 3.8 09/18/2024     09/18/2024    CO2 31 09/18/2024    GLUCOSE 78 09/18/2024    BUN 17 09/18/2024    CREATININE 0.93 09/18/2024     Hyperlipidemia and coronary calcifications (seen on CT 6/2024)  On lipitor 20. Kamini well. No myalgias, arthralgias, unusual weakness.  Lab Results   Component Value Date    CHOL 189 09/18/2024    HDL 56 09/18/2024    TRIG 75 09/18/2024    AST 10 (L) 09/18/2024    ALT 18 09/18/2024    ALKPHOS 104 09/18/2024    BILITOT 0.3 09/18/2024     Smoking  Smoking less since last visit, but still smoking some. Was on chantix, but d/c it after getting some chest discomfort.     Reviewed PMH, PSH, SH, Medications, allergies (see chart).  Current Outpatient Medications   Medication Sig    lisinopril (PRINIVIL;ZESTRIL) 40 MG tablet Take 1 tablet by mouth daily    traMADol (ULTRAM) 50 MG tablet Take 1 tablet by mouth every 8 hours as needed for Pain for up to 90 days. TID Max Daily Amount: 150 mg    DULoxetine

## 2025-04-15 ENCOUNTER — RESULTS FOLLOW-UP (OUTPATIENT)
Age: 75
End: 2025-04-15

## 2025-04-15 DIAGNOSIS — M17.32 POST-TRAUMATIC OSTEOARTHRITIS OF LEFT KNEE: Primary | ICD-10-CM

## 2025-04-15 DIAGNOSIS — M51.369 DEGENERATION OF INTERVERTEBRAL DISC OF LUMBAR REGION WITHOUT DISCOGENIC BACK PAIN OR LOWER EXTREMITY PAIN: ICD-10-CM

## 2025-04-15 LAB
ANION GAP SERPL CALC-SCNC: 4 MMOL/L (ref 2–12)
BASOPHILS # BLD: 0.05 K/UL (ref 0–0.1)
BASOPHILS NFR BLD: 0.8 % (ref 0–1)
BUN SERPL-MCNC: 11 MG/DL (ref 6–20)
BUN/CREAT SERPL: 12 (ref 12–20)
CALCIUM SERPL-MCNC: 9.5 MG/DL (ref 8.5–10.1)
CHLORIDE SERPL-SCNC: 109 MMOL/L (ref 97–108)
CO2 SERPL-SCNC: 28 MMOL/L (ref 21–32)
CREAT SERPL-MCNC: 0.89 MG/DL (ref 0.7–1.3)
CRP SERPL-MCNC: 1.33 MG/DL (ref 0–0.3)
DIFFERENTIAL METHOD BLD: NORMAL
EOSINOPHIL # BLD: 0.07 K/UL (ref 0–0.4)
EOSINOPHIL NFR BLD: 1.2 % (ref 0–7)
ERYTHROCYTE [DISTWIDTH] IN BLOOD BY AUTOMATED COUNT: 13.8 % (ref 11.5–14.5)
ERYTHROCYTE [SEDIMENTATION RATE] IN BLOOD: 8 MM/HR (ref 0–20)
GLUCOSE SERPL-MCNC: 96 MG/DL (ref 65–100)
HCT VFR BLD AUTO: 41.4 % (ref 36.6–50.3)
HGB BLD-MCNC: 13.1 G/DL (ref 12.1–17)
IMM GRANULOCYTES # BLD AUTO: 0.01 K/UL (ref 0–0.04)
IMM GRANULOCYTES NFR BLD AUTO: 0.2 % (ref 0–0.5)
LYMPHOCYTES # BLD: 1.06 K/UL (ref 0.8–3.5)
LYMPHOCYTES NFR BLD: 17.9 % (ref 12–49)
MCH RBC QN AUTO: 28.8 PG (ref 26–34)
MCHC RBC AUTO-ENTMCNC: 31.6 G/DL (ref 30–36.5)
MCV RBC AUTO: 91 FL (ref 80–99)
MONOCYTES # BLD: 0.69 K/UL (ref 0–1)
MONOCYTES NFR BLD: 11.7 % (ref 5–13)
NEUTS SEG # BLD: 4.03 K/UL (ref 1.8–8)
NEUTS SEG NFR BLD: 68.2 % (ref 32–75)
NRBC # BLD: 0 K/UL (ref 0–0.01)
NRBC BLD-RTO: 0 PER 100 WBC
PLATELET # BLD AUTO: 283 K/UL (ref 150–400)
PMV BLD AUTO: 10.9 FL (ref 8.9–12.9)
POTASSIUM SERPL-SCNC: 3.9 MMOL/L (ref 3.5–5.1)
RBC # BLD AUTO: 4.55 M/UL (ref 4.1–5.7)
SODIUM SERPL-SCNC: 141 MMOL/L (ref 136–145)
WBC # BLD AUTO: 5.9 K/UL (ref 4.1–11.1)

## 2025-04-16 ENCOUNTER — HOSPITAL ENCOUNTER (OUTPATIENT)
Facility: HOSPITAL | Age: 75
Discharge: HOME | End: 2025-04-19
Payer: MEDICARE

## 2025-04-16 ENCOUNTER — TELEPHONE (OUTPATIENT)
Age: 75
End: 2025-04-16

## 2025-04-16 DIAGNOSIS — M25.551 BILATERAL HIP PAIN: ICD-10-CM

## 2025-04-16 DIAGNOSIS — M47.816 LUMBAR SPONDYLOSIS: ICD-10-CM

## 2025-04-16 DIAGNOSIS — G89.29 CHRONIC MIDLINE LOW BACK PAIN WITHOUT SCIATICA: ICD-10-CM

## 2025-04-16 DIAGNOSIS — M25.552 BILATERAL HIP PAIN: ICD-10-CM

## 2025-04-16 DIAGNOSIS — M51.369 DEGENERATION OF INTERVERTEBRAL DISC OF LUMBAR REGION WITHOUT DISCOGENIC BACK PAIN OR LOWER EXTREMITY PAIN: ICD-10-CM

## 2025-04-16 DIAGNOSIS — M54.50 CHRONIC MIDLINE LOW BACK PAIN WITHOUT SCIATICA: ICD-10-CM

## 2025-04-16 LAB
AMPHETAMINES UR QL SCN: NEGATIVE NG/ML
BARBITURATES UR QL SCN: NEGATIVE NG/ML
BENZODIAZ UR QL SCN: NEGATIVE NG/ML
BZE UR QL SCN: NEGATIVE NG/ML
CANNABINOIDS UR QL SCN: NEGATIVE NG/ML
CREAT UR-MCNC: 38.3 MG/DL (ref 20–300)
LABORATORY COMMENT REPORT: NORMAL
METHADONE UR QL SCN: NEGATIVE NG/ML
OPIATES UR QL SCN: NEGATIVE NG/ML
OXYCODONE+OXYMORPHONE UR QL SCN: NEGATIVE NG/ML
PCP UR QL: NEGATIVE NG/ML
PH UR: 7.6 (ref 4.5–8.9)
PROPOXYPH UR QL SCN: NEGATIVE NG/ML

## 2025-04-16 PROCEDURE — 72110 X-RAY EXAM L-2 SPINE 4/>VWS: CPT

## 2025-04-16 PROCEDURE — 73521 X-RAY EXAM HIPS BI 2 VIEWS: CPT

## 2025-04-16 RX ORDER — MELOXICAM 15 MG/1
7.5-15 TABLET ORAL DAILY PRN
Qty: 90 TABLET | Refills: 1 | Status: SHIPPED | OUTPATIENT
Start: 2025-04-16

## 2025-04-16 RX ORDER — TRAMADOL HYDROCHLORIDE 50 MG/1
50 TABLET ORAL EVERY 8 HOURS PRN
Qty: 90 TABLET | Refills: 2 | Status: SHIPPED | OUTPATIENT
Start: 2025-04-16 | End: 2025-07-15

## 2025-04-16 NOTE — RESULT ENCOUNTER NOTE
Labs show no strong signs of inflammation/ rheumatoid type condition. Blood count good, Kidneys good. Salt levels good. I think a low dose NSAID would be a good choice for \"as needed\" treatment on the bad days. I favor meloxicam, as it's affordable and not as hard on the stomach as ibuprofen. Let me know if you would like me to order that. Dr. Groves

## 2025-04-18 ENCOUNTER — RESULTS FOLLOW-UP (OUTPATIENT)
Age: 75
End: 2025-04-18

## 2025-04-18 NOTE — RESULT ENCOUNTER NOTE
Xray Hips show arthritis. Also happens to show significant clogged arteries to the lower blood vessels of the abdomen.

## 2025-04-25 ENCOUNTER — HOSPITAL ENCOUNTER (OUTPATIENT)
Facility: HOSPITAL | Age: 75
Discharge: HOME | End: 2025-04-28
Payer: MEDICARE
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
## 2025-04-25 DIAGNOSIS — M25.552 LEFT HIP PAIN: Primary | ICD-10-CM

## 2025-04-25 DIAGNOSIS — M25.552 LEFT HIP PAIN: ICD-10-CM

## 2025-04-25 PROCEDURE — 73552 X-RAY EXAM OF FEMUR 2/>: CPT

## 2025-04-28 ENCOUNTER — PATIENT MESSAGE (OUTPATIENT)
Age: 75
End: 2025-04-28

## 2025-04-28 DIAGNOSIS — M79.605 LEFT LEG PAIN: Primary | ICD-10-CM

## 2025-04-30 RX ORDER — KETOROLAC TROMETHAMINE 30 MG/ML
30 INJECTION, SOLUTION INTRAMUSCULAR; INTRAVENOUS ONCE
Status: COMPLETED | OUTPATIENT
Start: 2025-04-30 | End: 2025-05-01

## 2025-04-30 RX ORDER — TRIAMCINOLONE ACETONIDE 40 MG/ML
20 INJECTION, SUSPENSION INTRA-ARTICULAR; INTRAMUSCULAR ONCE
Status: COMPLETED | OUTPATIENT
Start: 2025-04-30 | End: 2025-05-01

## 2025-05-01 ENCOUNTER — CLINICAL SUPPORT (OUTPATIENT)
Age: 75
End: 2025-05-01

## 2025-05-01 RX ADMIN — KETOROLAC TROMETHAMINE 30 MG: 30 INJECTION, SOLUTION INTRAMUSCULAR; INTRAVENOUS at 11:40

## 2025-05-01 RX ADMIN — TRIAMCINOLONE ACETONIDE 20 MG: 40 INJECTION, SUSPENSION INTRA-ARTICULAR; INTRAMUSCULAR at 11:41

## 2025-05-19 ENCOUNTER — OFFICE VISIT (OUTPATIENT)
Age: 75
End: 2025-05-19
Payer: MEDICARE

## 2025-05-19 VITALS
OXYGEN SATURATION: 98 % | DIASTOLIC BLOOD PRESSURE: 60 MMHG | HEART RATE: 73 BPM | WEIGHT: 150.2 LBS | TEMPERATURE: 97.7 F | HEIGHT: 69 IN | RESPIRATION RATE: 18 BRPM | BODY MASS INDEX: 22.25 KG/M2 | SYSTOLIC BLOOD PRESSURE: 128 MMHG

## 2025-05-19 DIAGNOSIS — Z87.891 PERSONAL HISTORY OF TOBACCO USE: ICD-10-CM

## 2025-05-19 DIAGNOSIS — K59.03 DRUG-INDUCED CONSTIPATION: Primary | ICD-10-CM

## 2025-05-19 DIAGNOSIS — Z12.11 COLON CANCER SCREENING: ICD-10-CM

## 2025-05-19 DIAGNOSIS — R10.32 LLQ ABDOMINAL PAIN: ICD-10-CM

## 2025-05-19 PROCEDURE — 1126F AMNT PAIN NOTED NONE PRSNT: CPT | Performed by: FAMILY MEDICINE

## 2025-05-19 PROCEDURE — 3017F COLORECTAL CA SCREEN DOC REV: CPT | Performed by: FAMILY MEDICINE

## 2025-05-19 PROCEDURE — G8427 DOCREV CUR MEDS BY ELIG CLIN: HCPCS | Performed by: FAMILY MEDICINE

## 2025-05-19 PROCEDURE — 1159F MED LIST DOCD IN RCRD: CPT | Performed by: FAMILY MEDICINE

## 2025-05-19 PROCEDURE — 1123F ACP DISCUSS/DSCN MKR DOCD: CPT | Performed by: FAMILY MEDICINE

## 2025-05-19 PROCEDURE — 3074F SYST BP LT 130 MM HG: CPT | Performed by: FAMILY MEDICINE

## 2025-05-19 PROCEDURE — 99213 OFFICE O/P EST LOW 20 MIN: CPT | Performed by: FAMILY MEDICINE

## 2025-05-19 PROCEDURE — G0296 VISIT TO DETERM LDCT ELIG: HCPCS | Performed by: FAMILY MEDICINE

## 2025-05-19 PROCEDURE — G8420 CALC BMI NORM PARAMETERS: HCPCS | Performed by: FAMILY MEDICINE

## 2025-05-19 PROCEDURE — 4004F PT TOBACCO SCREEN RCVD TLK: CPT | Performed by: FAMILY MEDICINE

## 2025-05-19 PROCEDURE — 3078F DIAST BP <80 MM HG: CPT | Performed by: FAMILY MEDICINE

## 2025-05-19 ASSESSMENT — PATIENT HEALTH QUESTIONNAIRE - PHQ9
SUM OF ALL RESPONSES TO PHQ QUESTIONS 1-9: 0
SUM OF ALL RESPONSES TO PHQ QUESTIONS 1-9: 0
2. FEELING DOWN, DEPRESSED OR HOPELESS: NOT AT ALL
1. LITTLE INTEREST OR PLEASURE IN DOING THINGS: NOT AT ALL
SUM OF ALL RESPONSES TO PHQ QUESTIONS 1-9: 0
SUM OF ALL RESPONSES TO PHQ QUESTIONS 1-9: 0

## 2025-05-19 NOTE — PATIENT INSTRUCTIONS
Constipation regimen (adult version)  Use miralax 2 scoops/d for 2 days, then boost to 3 scoops/d x2 days, then boost to 4 scoops/d as needed to make stool productive. Then back down, and use enough to keep stool soft and easy to pass. Gradually increase dietary fiber with fruits, vegetables, fiber bars until all stools are soft and large caliber.    Learning About Lung Cancer Screening  What is screening for lung cancer?     Lung cancer screening is a way to find some lung cancers early, before a person has any symptoms of the cancer.  Lung cancer screening may help those who have the highest risk for lung cancer--people age 50 and older who are or were heavy smokers. For most people, who aren't at increased risk, screening for lung cancer probably isn't helpful.  Screening won't prevent cancer. And it may not find all lung cancers. Lung cancer screening may lower the risk of dying from lung cancer in a small number of people.  How is it done?  Lung cancer screening is done with a low-dose CT (computed tomography) scan. A CT scan uses X-rays, or radiation, to make detailed pictures of your body. Experts recommend that screening be done in medical centers that focus on finding and treating lung cancer.  Who is screening recommended for?  Lung cancer screening is recommended for people age 50 and older who are or were heavy smokers. That means people with a smoking history of at least 20 pack years. A pack year is a way to measure how heavy a smoker you are or were.  To figure out your pack years, multiply how many packs a day on average (assuming 20 cigarettes per pack) you have smoked by how many years you have smoked. For example:  If you smoked 1 pack a day for 20 years, that's 1 times 20. So you have a smoking history of 20 pack years.  If you smoked 2 packs a day for 10 years, that's 2 times 10. So you have a smoking history of 20 pack years.  Experts agree that screening is for people who have a high risk of

## 2025-05-19 NOTE — PROGRESS NOTES
Ja Gamble is a 74 y.o. male  Chief Complaint   Patient presents with    Side Pain     Left lower side pain x 4 days. Picking up heavy boxes.      HPI:  LLQ abd pain  Gradually worsening. Has been decreasing in stool frequency over past.     DJD L-spine, DJD hip, bursitis  Doing wores since last visit. More achy to hips and low back, no trauma or activity changes. Feeling more stiff, sore. Not getting good relief from tramadol 50 TID. Not particularly related to cooler weather. Less able to perform his ADL's, more trouble sleeping. Remains on cymbalta as adjunct for pain relief. Did good course of phys therapy in past, no help, not interested in more therapy at this time.  UDS good 9/2024.  reviewed, in goal.  Lab Results   Component Value Date    SEDRATE 8 04/14/2025     Reviewed PMH, PSH, SH, Medications, allergies (see chart).  Current Outpatient Medications   Medication Sig    traMADol (ULTRAM) 50 MG tablet Take 1 tablet by mouth every 8 hours as needed for Pain for up to 90 days. TID Max Daily Amount: 150 mg    meloxicam (MOBIC) 15 MG tablet Take 0.5-1 tablets by mouth daily as needed (arthritis pain)    lisinopril (PRINIVIL;ZESTRIL) 40 MG tablet Take 1 tablet by mouth daily    DULoxetine (CYMBALTA) 60 MG extended release capsule TAKE 1 CAPSULE EVERY DAY FOR NERVE PAIN AND JOINT PAIN (DOSE INCREASE)    atorvastatin (LIPITOR) 20 MG tablet TAKE 1 TABLET EVERY DAY FOR CHOLESTEROL AND HEART    dilTIAZem (CARDIZEM CD) 180 MG extended release capsule TAKE 1 CAPSULE EVERY DAY FOR PRESSURE AND PULSE    albuterol sulfate HFA (VENTOLIN HFA) 108 (90 Base) MCG/ACT inhaler Inhale 2 puffs into the lungs 4 times daily as needed for Wheezing    aspirin 81 MG EC tablet Take 1 tablet by mouth daily     No current facility-administered medications for this visit.     ROS:   General: No fever, chills, or abnormal weight loss  Respiratory: No cough, dyspnea  CV: No chest pain, palpitations  : Normal urination, no 
partner? N   Are you in a relationship with someone who physically or mentally threatens you? N   Is it safe for you to go home? Y       Advance Care Planning     The patient has appointed the following active healthcare agents:    Primary Decision Maker: Britni Gamble - Spouse - [phone]

## 2025-07-14 ENCOUNTER — OFFICE VISIT (OUTPATIENT)
Age: 75
End: 2025-07-14
Payer: MEDICARE

## 2025-07-14 VITALS
HEIGHT: 69 IN | SYSTOLIC BLOOD PRESSURE: 148 MMHG | OXYGEN SATURATION: 98 % | HEART RATE: 67 BPM | DIASTOLIC BLOOD PRESSURE: 62 MMHG | TEMPERATURE: 97.7 F | BODY MASS INDEX: 23.67 KG/M2 | WEIGHT: 159.8 LBS | RESPIRATION RATE: 18 BRPM

## 2025-07-14 DIAGNOSIS — I25.10 ASCVD (ARTERIOSCLEROTIC CARDIOVASCULAR DISEASE): ICD-10-CM

## 2025-07-14 DIAGNOSIS — E78.00 PURE HYPERCHOLESTEROLEMIA: ICD-10-CM

## 2025-07-14 DIAGNOSIS — R10.32 LLQ ABDOMINAL PAIN: ICD-10-CM

## 2025-07-14 DIAGNOSIS — F17.200 SMOKER: ICD-10-CM

## 2025-07-14 DIAGNOSIS — I10 PRIMARY HYPERTENSION: Primary | ICD-10-CM

## 2025-07-14 DIAGNOSIS — M79.605 LEFT LEG PAIN: ICD-10-CM

## 2025-07-14 PROCEDURE — 1123F ACP DISCUSS/DSCN MKR DOCD: CPT | Performed by: FAMILY MEDICINE

## 2025-07-14 PROCEDURE — 3017F COLORECTAL CA SCREEN DOC REV: CPT | Performed by: FAMILY MEDICINE

## 2025-07-14 PROCEDURE — 3077F SYST BP >= 140 MM HG: CPT | Performed by: FAMILY MEDICINE

## 2025-07-14 PROCEDURE — 1159F MED LIST DOCD IN RCRD: CPT | Performed by: FAMILY MEDICINE

## 2025-07-14 PROCEDURE — 1126F AMNT PAIN NOTED NONE PRSNT: CPT | Performed by: FAMILY MEDICINE

## 2025-07-14 PROCEDURE — 4004F PT TOBACCO SCREEN RCVD TLK: CPT | Performed by: FAMILY MEDICINE

## 2025-07-14 PROCEDURE — 1160F RVW MEDS BY RX/DR IN RCRD: CPT | Performed by: FAMILY MEDICINE

## 2025-07-14 PROCEDURE — G8427 DOCREV CUR MEDS BY ELIG CLIN: HCPCS | Performed by: FAMILY MEDICINE

## 2025-07-14 PROCEDURE — 3078F DIAST BP <80 MM HG: CPT | Performed by: FAMILY MEDICINE

## 2025-07-14 PROCEDURE — 99214 OFFICE O/P EST MOD 30 MIN: CPT | Performed by: FAMILY MEDICINE

## 2025-07-14 PROCEDURE — G8420 CALC BMI NORM PARAMETERS: HCPCS | Performed by: FAMILY MEDICINE

## 2025-07-14 ASSESSMENT — PATIENT HEALTH QUESTIONNAIRE - PHQ9
1. LITTLE INTEREST OR PLEASURE IN DOING THINGS: NOT AT ALL
2. FEELING DOWN, DEPRESSED OR HOPELESS: NOT AT ALL
SUM OF ALL RESPONSES TO PHQ QUESTIONS 1-9: 0

## 2025-07-14 NOTE — PROGRESS NOTES
Ja Gamlbe is a 75 y.o. male  Chief Complaint   Patient presents with    Hypertension    Medication Check     HPI:  DJD L-spine, DJD hip, bursitis  Doing better since last visit. More achy to hips and low back, no trauma or activity changes. Feeling a lot better overall, not ast stiff. Doing some yard work, feeling strong. , sore. Not getting good relief from tramadol 50 TID. Not particularly related to cooler weather. Less able to perform his ADL's, more trouble sleeping. Remains on cymbalta as adjunct for pain relief. Did good course of phys therapy in past, no help, not interested in more therapy at this time.  UDS good 4/2025 .  reviewed, in goal.  Lab Results   Component Value Date    SEDRATE 8 04/14/2025     Hypertension  Blood pressures a little up today. PT reports is taking meds regularly. Home checks running 120's systolic. Management at last visit included con't current tx. Current regimen: ACEI, calcium channel blocker, thiazide. Symptoms include no symptoms. Patient denies chest pain, palpitations, peripheral edema.  Lab review:   Lab Results   Component Value Date/Time     04/14/2025 09:56 AM    K 3.9 04/14/2025 09:56 AM     04/14/2025 09:56 AM    CO2 28 04/14/2025 09:56 AM    BUN 11 04/14/2025 09:56 AM    CREATININE 0.89 04/14/2025 09:56 AM    GLUCOSE 96 04/14/2025 09:56 AM    CALCIUM 9.5 04/14/2025 09:56 AM    LABGLOM 90 04/14/2025 09:56 AM    LABGLOM >60 03/13/2024 11:20 AM    LABGLOM >60 03/27/2023 08:55 AM      Hyperlipidemia and coronary calcifications (seen on CT 6/2024)  On lipitor 20. Kamini well. No myalgias, arthralgias, unusual weakness.  Lab Results   Component Value Date    CHOL 189 09/18/2024    HDL 56 09/18/2024     (H) 09/18/2024    TRIG 75 09/18/2024    AST 10 (L) 09/18/2024    ALT 18 09/18/2024    ALKPHOS 104 09/18/2024    BILITOT 0.3 09/18/2024     Smoking  Smoking less since last visit, but still smoking some. Was on chantix, but d/c it after getting some 
ASSESSMENT   Feeding yourself No Help Needed No Help Needed No Help Needed No Help Needed No Help Needed No Help Needed No Help Needed   Getting from bed to chair No Help Needed No Help Needed No Help Needed No Help Needed No Help Needed No Help Needed No Help Needed   Getting dressed No Help Needed No Help Needed No Help Needed No Help Needed No Help Needed No Help Needed No Help Needed   Bathing or showering No Help Needed No Help Needed No Help Needed No Help Needed No Help Needed No Help Needed No Help Needed   Walk across the room (includes cane/walker) No Help Needed No Help Needed No Help Needed No Help Needed No Help Needed No Help Needed No Help Needed   Using the telphone No Help Needed No Help Needed No Help Needed No Help Needed No Help Needed No Help Needed No Help Needed   Taking your medications No Help Needed No Help Needed No Help Needed No Help Needed No Help Needed No Help Needed No Help Needed   Preparing meals No Help Needed No Help Needed No Help Needed No Help Needed No Help Needed No Help Needed No Help Needed   Managing money (expenses/bills) No Help Needed No Help Needed No Help Needed No Help Needed No Help Needed No Help Needed No Help Needed   Moderately strenuous housework (laundry) No Help Needed No Help Needed No Help Needed No Help Needed No Help Needed No Help Needed No Help Needed   Shopping for personal items (toiletries/medicines) No Help Needed No Help Needed No Help Needed No Help Needed No Help Needed No Help Needed No Help Needed   Shopping for groceries No Help Needed No Help Needed No Help Needed No Help Needed No Help Needed No Help Needed No Help Needed   Driving No Help Needed No Help Needed No Help Needed No Help Needed No Help Needed No Help Needed No Help Needed   Climbing a flight of stairs No Help Needed No Help Needed No Help Needed No Help Needed No Help Needed No Help Needed No Help Needed   Getting to places beyond walking distances No Help Needed No Help Needed

## 2025-07-16 ENCOUNTER — PATIENT MESSAGE (OUTPATIENT)
Age: 75
End: 2025-07-16

## 2025-07-16 DIAGNOSIS — R10.32 LLQ ABDOMINAL PAIN: Primary | ICD-10-CM

## 2025-07-16 NOTE — TELEPHONE ENCOUNTER
Contacted pt. Feeling nauseas, but not throwing up. Bowels moving ok. No BRBPR, no Melena.  Keeping foods, fluids down ok, but not that hungry. No wt changes. Pt is generall stable, just not feeling well. Soft, easy to pass, painless normally colored BM's 2-3x/day. Check CBC, CMP at [facility] tomorrow and further plan per report, Abdominal pain precautions given.

## 2025-07-17 ENCOUNTER — HOSPITAL ENCOUNTER (OUTPATIENT)
Facility: HOSPITAL | Age: 75
Discharge: HOME | End: 2025-07-20
Payer: MEDICARE

## 2025-07-17 LAB
ALBUMIN SERPL-MCNC: 3.8 G/DL (ref 3.5–5)
ALBUMIN/GLOB SERPL: 1.4 (ref 1.1–2.2)
ALP SERPL-CCNC: 87 U/L (ref 45–117)
ALT SERPL-CCNC: 25 U/L (ref 12–78)
ANION GAP SERPL CALC-SCNC: 8 MMOL/L (ref 2–12)
AST SERPL-CCNC: 12 U/L (ref 15–37)
BASOPHILS # BLD: 0.05 K/UL (ref 0–0.1)
BASOPHILS NFR BLD: 0.9 % (ref 0–1)
BILIRUB SERPL-MCNC: 0.3 MG/DL (ref 0.2–1)
BUN SERPL-MCNC: 19 MG/DL (ref 6–20)
BUN/CREAT SERPL: 20 (ref 12–20)
CALCIUM SERPL-MCNC: 9 MG/DL (ref 8.5–10.1)
CHLORIDE SERPL-SCNC: 108 MMOL/L (ref 97–108)
CO2 SERPL-SCNC: 28 MMOL/L (ref 21–32)
CREAT SERPL-MCNC: 0.94 MG/DL (ref 0.7–1.3)
DIFFERENTIAL METHOD BLD: ABNORMAL
EOSINOPHIL # BLD: 0.07 K/UL (ref 0–0.4)
EOSINOPHIL NFR BLD: 1.3 % (ref 0–7)
ERYTHROCYTE [DISTWIDTH] IN BLOOD BY AUTOMATED COUNT: 14.6 % (ref 11.5–14.5)
GLOBULIN SER CALC-MCNC: 2.8 G/DL (ref 2–4)
GLUCOSE SERPL-MCNC: 116 MG/DL (ref 65–100)
HCT VFR BLD AUTO: 41.6 % (ref 36.6–50.3)
HGB BLD-MCNC: 13.9 G/DL (ref 12.1–17)
IMM GRANULOCYTES # BLD AUTO: 0.02 K/UL (ref 0–0.04)
IMM GRANULOCYTES NFR BLD AUTO: 0.4 % (ref 0–0.5)
LYMPHOCYTES # BLD: 1.07 K/UL (ref 0.8–3.5)
LYMPHOCYTES NFR BLD: 20.1 % (ref 12–49)
MCH RBC QN AUTO: 30 PG (ref 26–34)
MCHC RBC AUTO-ENTMCNC: 33.4 G/DL (ref 30–36.5)
MCV RBC AUTO: 89.7 FL (ref 80–99)
MONOCYTES # BLD: 0.54 K/UL (ref 0–1)
MONOCYTES NFR BLD: 10.1 % (ref 5–13)
NEUTS SEG # BLD: 3.58 K/UL (ref 1.8–8)
NEUTS SEG NFR BLD: 67.2 % (ref 32–75)
NRBC # BLD: 0 K/UL (ref 0–0.01)
NRBC BLD-RTO: 0 PER 100 WBC
PLATELET # BLD AUTO: 210 K/UL (ref 150–400)
PMV BLD AUTO: 9.4 FL (ref 8.9–12.9)
POTASSIUM SERPL-SCNC: 4 MMOL/L (ref 3.5–5.1)
PROT SERPL-MCNC: 6.6 G/DL (ref 6.4–8.2)
RBC # BLD AUTO: 4.64 M/UL (ref 4.1–5.7)
SODIUM SERPL-SCNC: 144 MMOL/L (ref 136–145)
WBC # BLD AUTO: 5.3 K/UL (ref 4.1–11.1)

## 2025-07-17 PROCEDURE — 80053 COMPREHEN METABOLIC PANEL: CPT

## 2025-07-17 PROCEDURE — 36415 COLL VENOUS BLD VENIPUNCTURE: CPT

## 2025-07-17 PROCEDURE — 85025 COMPLETE CBC W/AUTO DIFF WBC: CPT

## 2025-08-29 ENCOUNTER — TELEPHONE (OUTPATIENT)
Age: 75
End: 2025-08-29

## 2025-08-29 DIAGNOSIS — M51.362 DEGENERATION OF INTERVERTEBRAL DISC OF LUMBAR REGION WITH DISCOGENIC BACK PAIN AND LOWER EXTREMITY PAIN: ICD-10-CM

## 2025-08-29 DIAGNOSIS — M47.816 LUMBAR SPONDYLOSIS: ICD-10-CM

## 2025-08-29 DIAGNOSIS — M51.369 DEGENERATION OF INTERVERTEBRAL DISC OF LUMBAR REGION WITHOUT DISCOGENIC BACK PAIN OR LOWER EXTREMITY PAIN: ICD-10-CM

## 2025-08-29 RX ORDER — TRAMADOL HYDROCHLORIDE 50 MG/1
50 TABLET ORAL EVERY 8 HOURS PRN
Qty: 90 TABLET | Refills: 2 | Status: SHIPPED | OUTPATIENT
Start: 2025-08-29 | End: 2025-11-27